# Patient Record
Sex: MALE | Race: WHITE | NOT HISPANIC OR LATINO | Employment: FULL TIME | URBAN - METROPOLITAN AREA
[De-identification: names, ages, dates, MRNs, and addresses within clinical notes are randomized per-mention and may not be internally consistent; named-entity substitution may affect disease eponyms.]

---

## 2017-01-09 ENCOUNTER — OFFICE VISIT (OUTPATIENT)
Dept: OTHER | Facility: OUTPATIENT CENTER | Age: 47
End: 2017-01-09

## 2017-01-09 DIAGNOSIS — Z51.89 AFTERCARE: Primary | ICD-10-CM

## 2017-01-15 NOTE — PROGRESS NOTES
[place] [facility] -  Case Progress Note    1/09/17    Client Name: Javi Salmeron  YOB: 1970  MRN:  [id number]  Treating Provider: Connie Baugh, PhD  Type of Session: Aftercare support goup.  Number of Minutes: 120    Current Symptoms/Status:  Doing well.  Having no boundary violations.    Progress Toward Treatment Goals:   Using support system.  Continues to date on particular woman.  Feeling goood about this.      Intervention: Modality and Description:  Support group.         Response to Intervention:  Didn't take much time but was an active participant in group.    Assignment:  Be more mindful.  Communicate with his girlfriend.  Stay within boundaries.    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  Return for aftercare support group.

## 2017-01-18 ENCOUNTER — OFFICE VISIT (OUTPATIENT)
Dept: OTHER | Facility: OUTPATIENT CENTER | Age: 47
End: 2017-01-18

## 2017-01-18 DIAGNOSIS — F91.8 CONDUCT DISORDER, UNDIFFERENTIATED TYPE: Primary | ICD-10-CM

## 2017-01-18 DIAGNOSIS — F34.1 DYSTHYMIC DISORDER: ICD-10-CM

## 2017-01-23 NOTE — PROGRESS NOTES
[facility] -  Case Progress Note    1/18/17    Client Name: Javi Salmeron  YOB: 1970  MRN:  [id number]  Treating Provider: Connie aBugh, PhD  Type of Session: Individual  Number of Minutes: 55    Current Symptoms/Status:  Doing well.  Having no boundary violations.    Progress Toward Treatment Goals:   Using support system.  Continues to one woman.  Feeling good about his healthy relationship.    Intervention: Modality and Description:  Cognitive behaivoral therapy.  Interpersonal techniques.  Relapse prevetnion.       Response to Intervention:  Relationship going very well.  Mostly concerned about when to introduce her to his kids.  Some disagreement with ex-wife about timing.  Suggested talking to her.  Feeling very good about his using his tools to deal with CAB.    Assignment:  Be more mindful.  Communicate with his ex-wife.  Stay within boundaries.    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  Return for aftercare support group.

## 2017-02-08 ENCOUNTER — TELEPHONE (OUTPATIENT)
Dept: OTHER | Facility: OUTPATIENT CENTER | Age: 47
End: 2017-02-08

## 2017-02-13 ENCOUNTER — OFFICE VISIT (OUTPATIENT)
Dept: OTHER | Facility: OUTPATIENT CENTER | Age: 47
End: 2017-02-13

## 2017-02-13 DIAGNOSIS — Z51.89 AFTERCARE: Primary | ICD-10-CM

## 2017-02-13 NOTE — MR AVS SNAPSHOT
After Visit Summary   2/13/2017    Javi Salmeron    MRN: [id number]           Patient Information     Date Of Birth          1970        Visit Information        Provider Department      2/13/2017 4:30 PM [facility] AC 2 [facility]        Today's Diagnoses     Aftercare    -  1       Follow-ups after your visit        Your next 10 appointments already scheduled     Mar 06, 2017  4:30 PM CST   GROUP with [facility] AC 2   [facility] ([facility])    [address]  [address]  [facility] [address]   [phone]            Mar 08, 2017  4:00 PM CST   INDIVIDUAL THERAPY with Thomas Baugh, PhD TYLER   [place] [facility] ([facility])    [address]  [address]  [facility] [address]   [phone]            Apr 12, 2017  4:00 PM CDT   INDIVIDUAL THERAPY with Thomas Baugh, PhD TYLER   [place] [facility] ([facility])    [address]  [address]  [facility] [address]   [phone]              Who to contact     Please call your clinic at [phone] to:    Ask questions about your health    Make or cancel appointments    Discuss your medicines    Learn about your test results    Speak to your doctor   If you have compliments or concerns about an experience at your clinic, or if you wish to file a complaint, please contact [facility] Patient Relations at [phone] or email us at [email or web address]         Additional Information About Your Visit        MyChart Information     Bookmytrainings.com is an electronic gateway that provides easy, online access to your medical records. With Bookmytrainings.[email or web address], you can request a clinic appointment, read your test results, renew a prescription or communicate with your care team.     To sign up for Cnano Technologyt visit the website at [email or web address]   You will be asked to enter the access code listed below, as well as  some personal information. Please follow the directions to create your username and password.     Your access code is: [email or web address]  Expires: 2017  9:41 AM     Your access code will  in 90 days. If you need help or a new code, please contact your [facility] or call [phone] for assistance.        Care EveryWhere ID     This is your Care EveryWhere ID. This could be used by other organizations to access your [place] medical records  [id number]         Blood Pressure from Last 3 Encounters:   16 134/88    Weight from Last 3 Encounters:   16 90.7 kg (200 lb)              We Performed the Following     Support Group - 120 Min. [44632.539]        Primary Care Provider    None       No address on file        Thank you!     Thank you for choosing [facility]  for your care. Our goal is always to provide you with excellent care. Hearing back from our patients is one way we can continue to improve our services. Please take a few minutes to complete the written survey that you may receive in the mail after your visit with us. Thank you!             Your Updated Medication List - Protect others around you: Learn how to safely use, store and throw away your medicines at www.disposemymeds.org.          This list is accurate as of: 17 11:59 PM.  Always use your most recent med list.                   Brand Name Dispense Instructions for use    citalopram 40 MG tablet    celeXA    30 tablet    Take 1 tablet (40 mg) by mouth every morning       cyclobenzaprine 10 MG tablet    FLEXERIL    30 tablet    Take 1 tablet (10 mg) by mouth 3 times daily as needed for muscle spasms       naltrexone 50 MG tablet    DEPADE;REVIA    90 tablet    Take 1 tablet (50 mg) by mouth every morning       OMEGA-3 FISH OIL PO      Take 1,200 mg by mouth daily       SYSTANE OP      Place 1 drop into both eyes 2 times daily

## 2017-02-14 NOTE — PROGRESS NOTES
[place] -  Case Progress Note    2/13/17    Client Name: Javi Salmeron  YOB: 1970  MRN:  [id number]  Treating Provider: Connie Baugh, PhD  Type of Session: Aftercare support goup.  Number of Minutes: 120    Current Symptoms/Status:  Doing well.  Having no boundary violations.    Progress Toward Treatment Goals:   Using support system.  Continues to date on particular woman.  Feeling goood about this.      Intervention: Modality and Description:  Aftercare Support group.         Response to Intervention:  Didn't take much time but was an active participant in group.    Assignment:  Stay within boundaries.    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  Return for aftercare support group.

## 2017-03-06 ENCOUNTER — OFFICE VISIT (OUTPATIENT)
Dept: OTHER | Facility: OUTPATIENT CENTER | Age: 47
End: 2017-03-06

## 2017-03-06 DIAGNOSIS — Z51.89 AFTERCARE: Primary | ICD-10-CM

## 2017-03-06 NOTE — MR AVS SNAPSHOT
After Visit Summary   3/6/2017    Javi Salmeron    MRN: [id number]           Patient Information     Date Of Birth          1970        Visit Information        Provider Department      3/6/2017 4:30 PM [facility] AC 2 [facility]        Today's Diagnoses     Aftercare    -  1       Follow-ups after your visit        Your next 10 appointments already scheduled     Apr 03, 2017  4:30 PM CDT   GROUP with [facility] AC 2   [facility] ([facility])    [address]  [address]  [facility] [address]   [phone]            Apr 12, 2017  4:00 PM CDT   INDIVIDUAL THERAPY with Thomas Baugh, PhD TYLER   [place] [facility] ([facility])    [address]  [address]  [facility] [address]   [phone]            May 16, 2017  4:00 PM CDT   INDIVIDUAL THERAPY with Thomas Baugh, PhD TYLER   [place] [facility] ([facility])    [address]  [address]  [facility] [address]   [phone]              Who to contact     Please call your clinic at [phone] to:    Ask questions about your health    Make or cancel appointments    Discuss your medicines    Learn about your test results    Speak to your doctor   If you have compliments or concerns about an experience at your clinic, or if you wish to file a complaint, please contact [facility] Phys[facility] Patient Relations at [phone] or email us at [email or web address]         Additional Information About Your Visit        MyChart Information     Spaces 2 Host is an electronic gateway that provides easy, online access to your medical records. With Spaces 2 Host, you can request a clinic appointment, read your test results, renew a prescription or communicate with your care team.     To sign up for Caarbont visit the website at [email or web address]   You will be asked to enter the access code listed below, as well as  some personal information. Please follow the directions to create your username and password.     Your access code is: [email or web address]  Expires: 2017 10:41 AM     Your access code will  in 90 days. If you need help or a new code, please contact your [facility] or call [phone] for assistance.        Care EveryWhere ID     This is your Care EveryWhere ID. This could be used by other organizations to access your [place] medical records  [id number]         Blood Pressure from Last 3 Encounters:   16 134/88    Weight from Last 3 Encounters:   16 90.7 kg (200 lb)              We Performed the Following     Support Group - 120 Min. [88316.539]        Primary Care Provider    None       No address on file        Thank you!     Thank you for choosing [facility]  for your care. Our goal is always to provide you with excellent care. Hearing back from our patients is one way we can continue to improve our services. Please take a few minutes to complete the written survey that you may receive in the mail after your visit with us. Thank you!             Your Updated Medication List - Protect others around you: Learn how to safely use, store and throw away your medicines at www.disposemymeds.org.          This list is accurate as of: 3/6/17 11:59 PM.  Always use your most recent med list.                   Brand Name Dispense Instructions for use    citalopram 40 MG tablet    celeXA    30 tablet    Take 1 tablet (40 mg) by mouth every morning       cyclobenzaprine 10 MG tablet    FLEXERIL    30 tablet    Take 1 tablet (10 mg) by mouth 3 times daily as needed for muscle spasms       naltrexone 50 MG tablet    DEPADE;REVIA    90 tablet    Take 1 tablet (50 mg) by mouth every morning       OMEGA-3 FISH OIL PO      Take 1,200 mg by mouth daily       SYSTANE OP      Place 1 drop into both eyes 2 times daily

## 2017-03-08 ENCOUNTER — OFFICE VISIT (OUTPATIENT)
Dept: OTHER | Facility: OUTPATIENT CENTER | Age: 47
End: 2017-03-08

## 2017-03-08 DIAGNOSIS — F91.8 CONDUCT DISORDER, UNDIFFERENTIATED TYPE: Primary | ICD-10-CM

## 2017-03-08 DIAGNOSIS — F34.1 DYSTHYMIC DISORDER: ICD-10-CM

## 2017-03-08 NOTE — MR AVS SNAPSHOT
After Visit Summary   3/8/2017    Javi Salmeron    MRN: [id number]           Patient Information     Date Of Birth          1970        Visit Information        Provider Department      3/8/2017 4:00 PM Thomas Baugh, PhD [facility]        Today's Diagnoses     Conduct disorder, undifferentiated type    -  1    Dysthymic disorder           Follow-ups after your visit        Your next 10 appointments already scheduled     Apr 03, 2017  4:30 PM CDT   [facility]   [facility] ([facility])    [address]  [address]  [facility] [address]   [phone]            Apr 12, 2017  4:00 PM CDT   INDIVIDUAL THERAPY with Thomas Baugh, PhD TYLER   [facility] ([facility])    [address]  [address]  [facility] [address]   [phone]            May 16, 2017  4:00 PM CDT   INDIVIDUAL THERAPY with Thomas Baugh, PhD TYLER   [facility] ([facility])    [address]  [address]  [facility] [address]   [phone]              Who to contact     Please call your clinic at [phone] to:    Ask questions about your health    Make or cancel appointments    Discuss your medicines    Learn about your test results    Speak to your doctor   If you have compliments or concerns about an experience at your clinic, or if you wish to file a complaint, please contact [facility] Patient Relations at [phone] or email us at [email or web address]         Additional Information About Your Visit        MyChart Information     VKernel Corporation is an electronic gateway that provides easy, online access to your medical records. With VKernel Corporation, you can request a clinic appointment, read your test results, renew a prescription or communicate with your care team.     To sign up for Vaporet visit the website at [email or web address]    You will be asked to enter the access code listed below, as well as some personal information. Please follow the directions to create your username and password.     Your access code is: [email or web address]  Expires: 2017 10:41 AM     Your access code will  in 90 days. If you need help or a new code, please contact your [facility] or call [phone] for assistance.        Care EveryWhere ID     This is your Care EveryWhere ID. This could be used by other organizations to access your [facility] medical records  [id number]         Blood Pressure from Last 3 Encounters:   16 134/88    Weight from Last 3 Encounters:   16 90.7 kg (200 lb)              We Performed the Following     Individual Psychotherapy (53+ min) [28280]        Primary Care Provider    None       No address on file        Thank you!     Thank you for choosing [facility] HEALTH  for your care. Our goal is always to provide you with excellent care. Hearing back from our patients is one way we can continue to improve our services. Please take a few minutes to complete the written survey that you may receive in the mail after your visit with us. Thank you!             Your Updated Medication List - Protect others around you: Learn how to safely use, store and throw away your medicines at www.disposemymeds.org.          This list is accurate as of: 3/8/17 11:59 PM.  Always use your most recent med list.                   Brand Name Dispense Instructions for use    citalopram 40 MG tablet    celeXA    30 tablet    Take 1 tablet (40 mg) by mouth every morning       cyclobenzaprine 10 MG tablet    FLEXERIL    30 tablet    Take 1 tablet (10 mg) by mouth 3 times daily as needed for muscle spasms       naltrexone 50 MG tablet    DEPADE;REVIA    90 tablet    Take 1 tablet (50 mg) by mouth every morning       OMEGA-3 FISH OIL PO      Take 1,200 mg by mouth daily       SYSTANE OP      Place 1 drop into both  eyes 2 times daily

## 2017-03-12 NOTE — PROGRESS NOTES
[place] [facility] -  Case Progress Note    3/08/17    Client Name: Javi Salmeron  YOB: 1970  MRN:  [id number]  Treating Provider: Connie Baugh, PhD  Type of Session: Individual  Number of Minutes: 55    Current Symptoms/Status:  Doing well.  Having no boundary violations.    Progress Toward Treatment Goals:   Using support system.  Continues to date particular woman.  Feeling good about this.      Intervention: Modality and Description:  Individual psychotheerapy.  Interpersonal and CBT approach.    Response to Intervention:  Dealing with conflicts around boundaries of his pornography with his partner.  Discussed what would be good boundaries for him.  He knows he will not stop using - and doesn't feel that is harmful.  Encouraged more open commuhnication with partner.    Feeling good about how his relationship is progressing.    Assignment:  Stay within boundaries.    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  Return for aftercare support group.

## 2017-03-22 NOTE — PROGRESS NOTES
[place] [facility] -  Case Progress Note    3/06/17    Client Name: Javi Salmeron  YOB: 1970  MRN:  [id number]  Treating Provider: Connie Baugh, PhD  Type of Session: Aftercare support goup.  Number of Minutes: 120    Current Symptoms/Status:  Doing well.  Having no boundary violations.    Progress Toward Treatment Goals:   Using support system.  Continues to date same particular woman.  Feeling goood about this.      Intervention: Modality and Description:  Aftercare Support group.         Response to Intervention:  Didn't take much time but was an active participant in group.    Assignment:  Stay within boundaries.    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  Return for aftercare support group.

## 2017-04-03 ENCOUNTER — OFFICE VISIT (OUTPATIENT)
Dept: OTHER | Facility: OUTPATIENT CENTER | Age: 47
End: 2017-04-03

## 2017-04-03 DIAGNOSIS — Z51.89 AFTERCARE: Primary | ICD-10-CM

## 2017-04-03 NOTE — PROGRESS NOTES
[place] [facility]  Case Progress Note    4/03/17    Client Name: Javi Salmeron  YOB: 1970  MRN:  [id number]  Treating Provider: Connie Baugh, PhD  Type of Session: Aftercare support goup.  Number of Minutes: 120    Current Symptoms/Status:  Doing well.  Having no boundary violations.    Progress Toward Treatment Goals:   Using support system.  Continues to date same particular woman.  Feeling goood about this.      Intervention: Modality and Description:  Aftercare Support group.         Response to Intervention:  Didn't take much time but was an active participant in group.    Assignment:  Stay within boundaries.    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  Return for aftercare support group.

## 2017-04-03 NOTE — MR AVS SNAPSHOT
After Visit Summary   4/3/2017    Javi Samleron    MRN: [id number]           Patient Information     Date Of Birth          1970        Visit Information        Provider Department      4/3/2017 4:30 PM [facility] CS[facility] AC 2 [facility]        Today's Diagnoses     Aftercare    -  1       Follow-ups after your visit        Your next 10 appointments already scheduled     Apr 12, 2017  4:00 PM CDT   INDIVIDUAL THERAPY with Thomas Baguh, PhD TYLER   [facility]    [address]  [address]  [facility] [address]   [phone]            May 01, 2017  4:30 PM CDT   [facility]   [place] [facility] ([facility]    [address]  [address]  [facility] [address]   [phone]            May 16, 2017  4:00 PM CDT   INDIVIDUAL THERAPY with Thomas Buagh, PhD TYLER   [place] [facility]    [address]  [address]  [facility] [address]   [phone]            Jun 05, 2017  4:30 PM CDT   [facility]   [place] [facility] ([facility]    [address]  [address]  [facility] [address]   [phone]              Who to contact     Please call your clinic at [phone] to:    Ask questions about your health    Make or cancel appointments    Discuss your medicines    Learn about your test results    Speak to your doctor   If you have compliments or concerns about an experience at your clinic, or if you wish to file a complaint, please contact [facility] Patient Relations at [phone] or email us at [email or web address]         Additional Information About Your Visit        Covacsishart Information     Fanwards is an electronic gateway that provides easy, online access to your medical records. With Fanwards, you can request a clinic appointment, read your test results, renew a  prescription or communicate with your care team.     To sign up for SinColahart visit the website at [email or web address]   You will be asked to enter the access code listed below, as well as some personal information. Please follow the directions to create your username and password.     Your access code is: [email or web address]  Expires: 2017 10:41 AM     Your access code will  in 90 days. If you need help or a new code, please contact your [facility] or call [phone] for assistance.        Care EveryWhere ID     This is your Care EveryWhere ID. This could be used by other organizations to access your [place] medical records  [id number]         Blood Pressure from Last 3 Encounters:   16 134/88    Weight from Last 3 Encounters:   16 90.7 kg (200 lb)              We Performed the Following     Support Group - 120 Min. [27548.539]        Primary Care Provider    None       No address on file        Thank you!     Thank you for choosing [facility] S[facility] HE[facility]  for your care. Our goal is always to provide you with excellent care. Hearing back from our patients is one way we can continue to improve our services. Please take a few minutes to complete the written survey that you may receive in the mail after your visit with us. Thank you!             Your Updated Medication List - Protect others around you: Learn how to safely use, store and throw away your medicines at www.disposemymeds.org.          This list is accurate as of: 4/3/17  6:12 PM.  Always use your most recent med list.                   Brand Name Dispense Instructions for use    citalopram 40 MG tablet    celeXA    30 tablet    Take 1 tablet (40 mg) by mouth every morning       cyclobenzaprine 10 MG tablet    FLEXERIL    30 tablet    Take 1 tablet (10 mg) by mouth 3 times daily as needed for muscle spasms       naltrexone 50 MG tablet    DEPADE;REVIA    90 tablet    Take 1 tablet (50 mg) by mouth  every morning       OMEGA-3 FISH OIL PO      Take 1,200 mg by mouth daily       SYSTANE OP      Place 1 drop into both eyes 2 times daily

## 2017-05-01 ENCOUNTER — OFFICE VISIT (OUTPATIENT)
Dept: OTHER | Facility: OUTPATIENT CENTER | Age: 47
End: 2017-05-01

## 2017-05-01 DIAGNOSIS — Z51.89 AFTERCARE: Primary | ICD-10-CM

## 2017-05-01 NOTE — PROGRESS NOTES
[facility]  Case Progress Note    5/01/17    Client Name: Javi Salmeron  YOB: 1970  MRN:  [id number]  Treating Provider: Connie Baugh, PhD  Type of Session: Aftercare support goup.  Number of Minutes: 120    Current Symptoms/Status:  Doing well.  Has been dealing with some triggering situations.  Having no boundary violations.    Progress Toward Treatment Goals:   Using support system.  Continues to date same particular woman.  Feeling goood about this.    Very stressed  about son who has been acting out.    Intervention: Modality and Description:  Aftercare Support group.         Response to Intervention:  Took time - talking about stress with son.  Feeling inadequate with son  acting out.  Received good feedback with group.    Assignment:  Stay within boundaries.    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  Return for aftercare support group.

## 2017-05-01 NOTE — MR AVS SNAPSHOT
After Visit Summary   5/1/2017    Javi Salmeron    MRN: [id number]           Patient Information     Date Of Birth          1970        Visit Information        Provider Department      5/1/2017 4:30 PM [facility] AC [facility]        Today's Diagnoses     Aftercare    -  1       Follow-ups after your visit        Your next 10 appointments already scheduled     May 16, 2017  4:00 PM CDT   INDIVIDUAL THERAPY with Thomas Baugh, PhD TYLER   [facility] ([facility])    [address]  Mail [address] 7521  [facility] [address]   [phone]            Jun 05, 2017  4:30 PM CDT   GROUP with  UMP CSB AC 2   [facility] ([facility])    [address]  [address]  [facility] [address]   [phone]            Jun 13, 2017  4:00 PM CDT   INDIVIDUAL THERAPY with Thomas Baugh, PhD TYLER   [facility] ([facility])    [address]  [address]  [facility] [address]   [phone]            Jul 03, 2017  4:30 PM CDT   GROUP with  UMP CSB AC 2   [facility] ([facility])    [address]  [address]  [facility] [address]   [phone]            Jul 19, 2017  4:00 PM CDT   INDIVIDUAL THERAPY with Thomas Baugh, PhD    [facility] ([facility])    [address]  [address]  [facility] [address]   [phone]            Aug 07, 2017  4:30 PM CDT   GROUP with  UMP CSB AC 2   [place] ([facility])    [address]  [address]  [facility] [address]   [phone]              Who to contact     Please call your clinic at [phone] to:    Ask questions about your health    Make or cancel appointments    Discuss your medicines    Learn about your test results    Speak to your doctor   If you have compliments or concerns about an experience at your clinic, or if you  wish to file a complaint, please contact [facility] Patient Relations at [phone] or email us at [email or web address]         Additional Information About Your Visit        Kurve TechnologyharTiggly Information     Farmol is an electronic gateway that provides easy, online access to your medical records. With Farmol, you can request a clinic appointment, read your test results, renew a prescription or communicate with your care team.     To sign up for Farmol visit the website at www.[email or web address] Style [email or web address] Twitter Dashboard   You will be asked to enter the access code listed below, as well as some personal information. Please follow the directions to create your username and password.     Your access code is: [email or web address]  Expires: 2017 10:41 AM     Your access code will  in 90 days. If you need help or a new code, please contact your [facility] or call [phone] for assistance.        Care EveryWhere ID     This is your Care EveryWhere ID. This could be used by other organizations to access your [place] medical records  [id number]         Blood Pressure from Last 3 Encounters:   16 134/88    Weight from Last 3 Encounters:   16 90.7 kg (200 lb)              We Performed the Following     Support Group - 120 Min. [40492.539]        Primary Care Provider    None       No address on file        Thank you!     Thank you for choosing [facility]  for your care. Our goal is always to provide you with excellent care. Hearing back from our patients is one way we can continue to improve our services. Please take a few minutes to complete the written survey that you may receive in the mail after your visit with us. Thank you!             Your Updated Medication List - Protect others around you: Learn how to safely use, store and throw away your medicines at www.disposemymeds.org.          This list is accurate as of: 17  5:18 PM.  Always  use your most recent med list.                   Brand Name Dispense Instructions for use    citalopram 40 MG tablet    celeXA    30 tablet    Take 1 tablet (40 mg) by mouth every morning       cyclobenzaprine 10 MG tablet    FLEXERIL    30 tablet    Take 1 tablet (10 mg) by mouth 3 times daily as needed for muscle spasms       naltrexone 50 MG tablet    DEPADE;REVIA    90 tablet    Take 1 tablet (50 mg) by mouth every morning       OMEGA-3 FISH OIL PO      Take 1,200 mg by mouth daily       SYSTANE OP      Place 1 drop into both eyes 2 times daily

## 2017-05-16 ENCOUNTER — OFFICE VISIT (OUTPATIENT)
Dept: OTHER | Facility: OUTPATIENT CENTER | Age: 47
End: 2017-05-16

## 2017-05-16 DIAGNOSIS — F91.8 CONDUCT DISORDER, UNDIFFERENTIATED TYPE: Primary | ICD-10-CM

## 2017-05-16 DIAGNOSIS — F34.1 DYSTHYMIC DISORDER: ICD-10-CM

## 2017-05-16 NOTE — PROGRESS NOTES
[facility]  Case Progress Note    5/16/17    Client Name: Javi Salmeron  YOB: 1970  MRN:  [id number]  Treating Provider: Connie Baugh, PhD  Type of Session: Individual  Number of Minutes: 55    Current Symptoms/Status:  Doing well.  Has been dealing with some triggering situations.  Having no boundary violations.    Progress Toward Treatment Goals:   Using support system.  Continues to date same particular woman.  Feeling goood about this.    Very stressed  about son who has been acting out.    Intervention: Modality and Description:  Individual therapy; CBT and relapse prevention approach.      Response to Intervention:  Still dealing with stress with son.  Encouraged him to get some help with parenting.  Some impression management issues with girlfriend - and talked about ways of being more authentic.    Staying within boundaries and feeling good about his progress.    Assignment:  Stay within boundaries.  Read Mervat's book.    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  Return for aftercare support group.

## 2017-05-16 NOTE — MR AVS SNAPSHOT
After Visit Summary   5/16/2017    Javi Salmeron    MRN: [id number]           Patient Information     Date Of Birth          1970        Visit Information        Provider Department      5/16/2017 4:00 PM Thomas Baugh, PhD TYL[facility]        Today's Diagnoses     Conduct disorder, undifferentiated type    -  1    Dysthymic disorder           Follow-ups after your visit        Your next 10 appointments already scheduled     Jun 05, 2017  4:30 PM CDT   GROUP with SE P CSB AC 2   [facility] ([facility])    [address]  [address]  [facility] [address]   [phone]            Jun 13, 2017  4:00 PM CDT   INDIVIDUAL THERAPY with Thomas Baugh, PhD TYLER   [facility] ([facility])    [address]  [address]  [facility] [address]   [phone]            Jul 03, 2017  4:30 PM CDT   [facility]   [place] [facility] ([facility])    [address]  Mail [address]  [facility] [address]   [phone]            Jul 19, 2017  4:00 PM CDT   INDIVIDUAL THERAPY with Thomas Baugh, PhD TYLER   [facility] ([facility])    [address]  [address]  [facility] [address]   [phone]            Aug 07, 2017  4:30 PM CDT   [facility]   [place] [facility] ([facility])    [address]  [address]  [facility] [address]   [phone]              Who to contact     Please call your clinic at [phone] to:    Ask questions about your health    Make or cancel appointments    Discuss your medicines    Learn about your test results    Speak to your doctor   If you have compliments or concerns about an experience at your clinic, or if you wish to file a complaint, please contact [facility] Patient Relations at [phone] or email us at  [email or web address]         Additional Information About Your Visit        MECLUBharConsumer Health Advisers Information     GetOne Rewards is an electronic gateway that provides easy, online access to your medical records. With GetOne Rewards, you can request a clinic appointment, read your test results, renew a prescription or communicate with your care team.     To sign up for GetOne Rewards visit the website at [email or web address]   You will be asked to enter the access code listed below, as well as some personal information. Please follow the directions to create your username and password.     Your access code is: [email or web address]  Expires: 2017  5:07 PM     Your access code will  in 90 days. If you need help or a new code, please contact your [facility] or call [phone] for assistance.        Care EveryWhere ID     This is your Care EveryWhere ID. This could be used by other organizations to access your [place] medical records  [id number]         Blood Pressure from Last 3 Encounters:   16 134/88    Weight from Last 3 Encounters:   16 90.7 kg (200 lb)              We Performed the Following     Individual Psychotherapy (53+ min) [80989]        Primary Care Provider    None       No address on file        Thank you!     Thank you for choosing [facility]  for your care. Our goal is always to provide you with excellent care. Hearing back from our patients is one way we can continue to improve our services. Please take a few minutes to complete the written survey that you may receive in the mail after your visit with us. Thank you!             Your Updated Medication List - Protect others around you: Learn how to safely use, store and throw away your medicines at www.disposemymeds.org.          This list is accurate as of: 17  5:07 PM.  Always use your most recent med list.                   Brand Name Dispense Instructions for use    citalopram 40 MG tablet     celeXA    30 tablet    Take 1 tablet (40 mg) by mouth every morning       cyclobenzaprine 10 MG tablet    FLEXERIL    30 tablet    Take 1 tablet (10 mg) by mouth 3 times daily as needed for muscle spasms       naltrexone 50 MG tablet    DEPADE;REVIA    90 tablet    Take 1 tablet (50 mg) by mouth every morning       OMEGA-3 FISH OIL PO      Take 1,200 mg by mouth daily       SYSTANE OP      Place 1 drop into both eyes 2 times daily

## 2017-06-05 ENCOUNTER — OFFICE VISIT (OUTPATIENT)
Dept: OTHER | Facility: OUTPATIENT CENTER | Age: 47
End: 2017-06-05

## 2017-06-05 DIAGNOSIS — Z51.89 AFTERCARE: Primary | ICD-10-CM

## 2017-06-05 NOTE — PROGRESS NOTES
[facility]  Case Progress Note    6/05/17    Client Name: Javi Salmeron  YOB: 1970  MRN:  [id number]  Treating Provider: Connie Baugh, PhD  Type of Session: Aftercare Group  Number of Minutes: 120    Current Symptoms/Status:  Had a rough month.  Difficulty with son - triggering him.   [place] violations: Went on [place] chatting.  Internet porn. as been dealing with some triggering situations.       Progress Toward Treatment Goals:   Using support system.  But having difficulty with boundaries - resulting from stress.  Very stressed  about son who has been acting out.    Intervention: Modality and Description:  Aftercare support group.    Response to Intervention:  Son acting out - and being very disrespectful to him.  Very triggered.  Received good feedback from group.    Assignment:  Stay within boundaries.  Read Mervat's book.    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  Return for aftercare support group.

## 2017-06-05 NOTE — MR AVS SNAPSHOT
After Visit Summary   6/5/2017    Javi Salmeron    MRN: [id number]           Patient Information     Date Of Birth          1970        Visit Information        Provider Department      [facility]        Today's Diagnoses     Aftercare    -  1       Follow-ups after your visit        Your next 10 appointments already scheduled     Jun 13, 2017  4:00 PM CDT   INDIVIDUAL THERAPY with Thomas Baugh, PhD TYLER   [facility] ([facility])    [address]  [address]  [facility] [address]   [phone]            Jul 03, 2017  4:30 PM CDT   [facility]   [facility] ([facility])    [address]  [address]  [facility] [address]   [phone]            Jul 19, 2017  4:00 PM CDT   INDIVIDUAL THERAPY with Thomas Baugh, PhD TYLER   [facility] ([facility])    [address]  [address]  [facility] [address]   [phone]            Aug 07, 2017  4:30 PM CDT   [facility]   [facility] ([facility])    [address]  [address]  [facility] [address]   [phone]            Aug 09, 2017  4:00 PM CDT   INDIVIDUAL THERAPY with Thomas Baugh, PhD TYLER   [facility] ([facility])    [address]  [address]  [facility] [address]   [phone]              Who to contact     Please call your clinic at [phone] to:    Ask questions about your health    Make or cancel appointments    Discuss your medicines    Learn about your test results    Speak to your doctor   If you have compliments or concerns about an experience at your clinic, or if you wish to file a complaint, please contact [facility] Patient Relations at [phone] or email us at [email or web address]         Additional Information About  Your Visit        Fry Multimedia Information     Fry Multimedia is an electronic gateway that provides easy, online access to your medical records. With Fry Multimedia, you can request a clinic appointment, read your test results, renew a prescription or communicate with your care team.     To sign up for Fry Multimedia visit the website at [email or web address] Network   You will be asked to enter the access code listed below, as well as some personal information. Please follow the directions to create your username and password.     Your access code is: [email or web address]  Expires: 2017  5:07 PM     Your access code will  in 90 days. If you need help or a new code, please contact your [facility] or call [phone] for assistance.        Care EveryWhere ID     This is your Care EveryWhere ID. This could be used by other organizations to access your [place] medical records  [id number]         Blood Pressure from Last 3 Encounters:   16 134/88    Weight from Last 3 Encounters:   16 90.7 kg (200 lb)              We Performed the Following     Support Group - 120 Min. [83582.539]        Primary Care Provider    None       No address on file        Thank you!     Thank you for choosing [facility]  for your care. Our goal is always to provide you with excellent care. Hearing back from our patients is one way we can continue to improve our services. Please take a few minutes to complete the written survey that you may receive in the mail after your visit with us. Thank you!             Your Updated Medication List - Protect others around you: Learn how to safely use, store and throw away your medicines at www.disposemymeds.org.          This list is accurate as of: 17  5:33 PM.  Always use your most recent med list.                   Brand Name Dispense Instructions for use    citalopram 40 MG tablet    celeXA    30 tablet    Take 1 tablet (40 mg) by mouth every morning        cyclobenzaprine 10 MG tablet    FLEXERIL    30 tablet    Take 1 tablet (10 mg) by mouth 3 times daily as needed for muscle spasms       naltrexone 50 MG tablet    DEPADE;REVIA    90 tablet    Take 1 tablet (50 mg) by mouth every morning       OMEGA-3 FISH OIL PO      Take 1,200 mg by mouth daily       SYSTANE OP      Place 1 drop into both eyes 2 times daily

## 2017-06-13 ENCOUNTER — OFFICE VISIT (OUTPATIENT)
Dept: OTHER | Facility: OUTPATIENT CENTER | Age: 47
End: 2017-06-13

## 2017-06-13 DIAGNOSIS — F91.8 CONDUCT DISORDER, UNDIFFERENTIATED TYPE: Primary | ICD-10-CM

## 2017-06-13 DIAGNOSIS — F34.1 DYSTHYMIC DISORDER: ICD-10-CM

## 2017-06-13 NOTE — MR AVS SNAPSHOT
After Visit Summary   6/13/2017    Javi Salmeron    MRN: [id number]           Patient Information     Date Of Birth          1970        Visit Information        Provider Department      6/13/2017 4:00 PM Thomas Baugh, PhD TYLER [facility]        Today's Diagnoses     Conduct disorder, undifferentiated type    -  1    Dysthymic disorder           Follow-ups after your visit        Your next 10 appointments already scheduled     Jul 03, 2017  4:30 PM CDT   GROUP with [facility] CSB AC 2   [place] [facility] ([facility])    [address]  Mail [address] 7521  [facility] [address]   [phone]            Jul 19, 2017  4:00 PM CDT   INDIVIDUAL THERAPY with Thomas Baugh, PhD TYLER   [place] [facility] ([facility])    [address]  Mail [address]  [facility] [address]   [phone]            Aug 07, 2017  4:30 PM CDT   GROUP with [facility] CSB AC 2   [place] [facility] ([facility])    [address]  Mail [address]  [facility] [address]   [phone]            Aug 09, 2017  4:00 PM CDT   INDIVIDUAL THERAPY with Thomas Baugh, PhD TYLER   [place] [facility] ([facility])    [address]  Mail [address] 75[address]  [facility] [address]   [phone]              Who to contact     Please call your clinic at [phone] to:    Ask questions about your health    Make or cancel appointments    Discuss your medicines    Learn about your test results    Speak to your doctor   If you have compliments or concerns about an experience at your clinic, or if you wish to file a complaint, please contact [facility] Patient Relations at [phone] or email us at [email or web address]         Additional Information About Your Visit        MyChart Information     Whaleback Systems is an electronic gateway that provides easy, online access to your medical records. With Whaleback Systems,  you can request a clinic appointment, read your test results, renew a prescription or communicate with your care team.     To sign up for [employer] visit the website at [email or web address]   You will be asked to enter the access code listed below, as well as some personal information. Please follow the directions to create your username and password.     Your access code is: [email or web address]  Expires: 2017  5:07 PM     Your access code will  in 90 days. If you need help or a new code, please contact your [facility] or call [phone] for assistance.        Care EveryWhere ID     This is your Care EveryWhere ID. This could be used by other organizations to access your [place] medical records  [id number]         Blood Pressure from Last 3 Encounters:   16 134/88    Weight from Last 3 Encounters:   16 90.7 kg (200 lb)              We Performed the Following     Individual Psychotherapy (53+ min) [10722]        Primary Care Provider    None       No address on file        Thank you!     Thank you for choosing [facility]  for your care. Our goal is always to provide you with excellent care. Hearing back from our patients is one way we can continue to improve our services. Please take a few minutes to complete the written survey that you may receive in the mail after your visit with us. Thank you!             Your Updated Medication List - Protect others around you: Learn how to safely use, store and throw away your medicines at www.disposemymeds.org.          This list is accurate as of: 17 11:59 PM.  Always use your most recent med list.                   Brand Name Dispense Instructions for use    citalopram 40 MG tablet    celeXA    30 tablet    Take 1 tablet (40 mg) by mouth every morning       cyclobenzaprine 10 MG tablet    FLEXERIL    30 tablet    Take 1 tablet (10 mg) by mouth 3 times daily as needed for muscle spasms       naltrexone  50 MG tablet    DEPADE;REVIA    90 tablet    Take 1 tablet (50 mg) by mouth every morning       OMEGA-3 FISH OIL PO      Take 1,200 mg by mouth daily       SYSTANE OP      Place 1 drop into both eyes 2 times daily

## 2017-06-13 NOTE — PROGRESS NOTES
[facility]  Case Progress Note    6/13/17    Client Name: Javi Salmeron  YOB: 1970  MRN:  [id number]  Treating Provider: Connie Baugh, PhD  Type of Session: Individual  Number of Minutes: 55    Current Symptoms/Status:  Had a rough month.  Difficulty with son - triggering him.   [place] violations: Went on [employer] chatting.  Internet porn. as been dealing with some triggering situations.       Progress Toward Treatment Goals:   Using support system.  But having difficulty with boundaries - resulting from stress.  Very stressed  about son who has been acting out.    Intervention: Modality and Description:  CBT, interpersonal    Response to Intervention:  Will have therapy with son - and this might help with the conflict  Encouraged more family therapy.  Taps into his core negative fuels - needs to work on re-framing - and also not going into negative cycle when triggered.  Death of cousin very troubling - suicide like his father.  Changes at work with new supervisor.  Stressful.  Talked about different coping strategies.       Assignment:  Stay within boundaries.  Read Mervat's book.    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  Return for aftercare support group.

## 2017-07-10 ENCOUNTER — OFFICE VISIT (OUTPATIENT)
Dept: OTHER | Facility: OUTPATIENT CENTER | Age: 47
End: 2017-07-10

## 2017-07-10 DIAGNOSIS — Z51.89 ENCOUNTER FOR OTHER SPECIFIED AFTERCARE: Primary | ICD-10-CM

## 2017-07-10 NOTE — MR AVS SNAPSHOT
After Visit Summary   7/10/2017    Javi Salmeron    MRN: [id number]           Patient Information     Date Of Birth          1970        Visit Information        Provider Department      7/10/2017 4:30 PM SE [facility] CSB AC 2 [facility]        Today's Diagnoses     Encounter for other specified aftercare    -  1       Follow-ups after your visit        Your next 10 appointments already scheduled     Jul 19, 2017  4:00 PM CDT   INDIVIDUAL THERAPY with Thomas Baugh, PhD    [facility] ([facility])    [address]  Mail Code 7521  [facility] [address]   [phone]            Aug 07, 2017  4:30 PM CDT   GROUP with SE P CSB AC 2   [facility] ([facility])    [address]  Mail [address] 7521  [facility] [address]   [phone]            Aug 09, 2017  4:00 PM CDT   INDIVIDUAL THERAPY with Thomas Baugh, PhD    [facility] ([facility])    [address]  Mail [address] 7521  [facility] [address]   [phone]            Sep 06, 2017  4:00 PM CDT   INDIVIDUAL THERAPY with Thomas Baugh, PhD    [facility] ([facility])    [address]  Mail [address] 7521  [facility] [address]   [phone]            Sep 11, 2017  4:30 PM CDT   GROUP with [facility] CSB AC 2   [facility] ([facility])    [address]  Mail [address] 7521  [facility] [address]   [phone]              Who to contact     Please call your clinic at [phone] to:    Ask questions about your health    Make or cancel appointments    Discuss your medicines    Learn about your test results    Speak to your doctor   If you have compliments or concerns about an experience at your clinic, or if you wish to file a complaint, please contact [facility] Patient Relations at [phone] or email us at [email or web address]          Additional Information About Your Visit        [email or web address] Information     [email or web address] is an electronic gateway that provides easy, online access to your medical records. With [email or web address], you can request a clinic appointment, read your test results, renew a prescription or communicate with your care team.     To sign up for [email or web address] visit the website at [email or web address]   You will be asked to enter the access code listed below, as well as some personal information. Please follow the directions to create your username and password.     Your access code is: [email or web address]  Expires: 2017  5:07 PM     Your access code will  in 90 days. If you need help or a new code, please contact your [facility] or call [phone] for assistance.        Care EveryWhere ID     This is your Care EveryWhere ID. This could be used by other organizations to access your [facility] medical records  [id number]         Blood Pressure from Last 3 Encounters:   16 134/88    Weight from Last 3 Encounters:   16 90.7 kg (200 lb)              We Performed the Following     Support Group 120 Min. ([phone])        Primary Care Provider    None       No address on file        Equal Access to Services     ANSHUL PALMER : Hadii mont[place] connollyo Sosundar, waaxda luqadaha, qaybta kaalmada aderachel, ying lainez. So [facility] [phone].    ATENCIÓN: Si habla español, tiene a horton disposición servicios gratuitos de asistencia lingüística. Llame al [phone].    We comply with applicable federal civil rights laws and [place] laws. We do not discriminate on the basis of race, color, national origin, age, disability sex, sexual orientation or gender identity.            Thank you!     Thank you for choosing [place] [facility]  for your care. Our goal is always to provide you with excellent care. Hearing back from our patients is one way we can continue to improve our  services. Please take a few minutes to complete the written survey that you may receive in the mail after your visit with us. Thank you!             Your Updated Medication List - Protect others around you: Learn how to safely use, store and throw away your medicines at www.disposemymeds.org.          This list is accurate as of: 7/10/17 11:59 PM.  Always use your most recent med list.                   Brand Name Dispense Instructions for use Diagnosis    citalopram 40 MG tablet    celeXA    30 tablet    Take 1 tablet (40 mg) by mouth every morning    Depression       cyclobenzaprine 10 MG tablet    FLEXERIL    30 tablet    Take 1 tablet (10 mg) by mouth 3 times daily as needed for muscle spasms    Acute bilateral low back pain without sciatica, Lumbar strain, initial encounter       naltrexone 50 MG tablet    DEPADE;REVIA    90 tablet    Take 1 tablet (50 mg) by mouth every morning    Unspecified psychosexual disorder       OMEGA-3 FISH OIL PO      Take 1,200 mg by mouth daily        SYSTANE OP      Place 1 drop into both eyes 2 times daily
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
## 2017-07-19 ENCOUNTER — OFFICE VISIT (OUTPATIENT)
Dept: OTHER | Facility: OUTPATIENT CENTER | Age: 47
End: 2017-07-19

## 2017-07-19 DIAGNOSIS — F91.8 CONDUCT DISORDER, UNDIFFERENTIATED TYPE: Primary | ICD-10-CM

## 2017-07-19 DIAGNOSIS — F34.1 DYSTHYMIC DISORDER: ICD-10-CM

## 2017-07-19 NOTE — MR AVS SNAPSHOT
After Visit Summary   7/19/2017    Javi Salmeron    MRN: [id number]           Patient Information     Date Of Birth          1970        Visit Information        Provider Department      7/19/2017 5:00 PM Thomas Baugh, PhD LP [facility]        Today's Diagnoses     Conduct disorder, undifferentiated type    -  1    Dysthymic disorder           Follow-ups after your visit        Your next 10 appointments already scheduled     Aug 07, 2017  4:30 PM CDT   GROUP with [facility] CSB AC 2   [place] ([facility])    [address]  Mail [address] 7521  [facility] [address]   [phone]            Aug 09, 2017  4:00 PM CDT   INDIVIDUAL THERAPY with Thomas Baugh, PhD TYLER   [facility] ([facility])    [address]  Mail [address] 7521  [facility] [address]   [phone]            Sep 06, 2017  4:00 PM CDT   INDIVIDUAL THERAPY with Thomas Baugh, PhD TYLER   [facility] ([facility])    [address]  Mail [address] 7521  [facility] [address]   [phone]            Sep 11, 2017  4:30 PM CDT   GROUP with [facility] CSB AC 2   [place] ([facility])    [address]  Mail [address] 7521  [facility] [address]   [phone]              Who to contact     Please call your clinic at [phone] to:    Ask questions about your health    Make or cancel appointments    Discuss your medicines    Learn about your test results    Speak to your doctor   If you have compliments or concerns about an experience at your clinic, or if you wish to file a complaint, please contact [facility] Patient Relations at [phone] or email us at [email or web address]         Additional Information About Your Visit        MyChart Information     Manzama is an electronic gateway that provides easy, online access to your medical records. With Manzama,  you can request a clinic appointment, read your test results, renew a prescription or communicate with your care team.     To sign up for AugmentWare visit the website at [email or web address]   You will be asked to enter the access code listed below, as well as some personal information. Please follow the directions to create your username and password.     Your access code is: [email or web address]  Expires: 2017  5:07 PM     Your access code will  in 90 days. If you need help or a new code, please contact your [facility] or call [phone] for assistance.        Care EveryWhere ID     This is your Care EveryWhere ID. This could be used by other organizations to access your [facility] medical records  [id number]         Blood Pressure from Last 3 Encounters:   16 134/88    Weight from Last 3 Encounters:   16 90.7 kg (200 lb)              We Performed the Following     Individual Psychotherapy (38-52 min) [02298]        Primary Care Provider    None       No address on file        Equal Access to Services     [facility] CenterREBECCA : Hadii mont[place] ku hadasho Soomaali, waaxda luqadaha, qaybta kaalmada ademaxyajaren, ying carmona . So [facility] [phone].    ATENCIÓN: Si habla español, tiene a horton disposición servicios gratuitos de asistencia lingüística. Llame al [phone].    We comply with applicable federal civil rights laws and [place] laws. We do not discriminate on the basis of race, color, national origin, age, disability sex, sexual orientation or gender identity.            Thank you!     Thank you for choosing [place] [facility]  for your care. Our goal is always to provide you with excellent care. Hearing back from our patients is one way we can continue to improve our services. Please take a few minutes to complete the written survey that you may receive in the mail after your visit with us. Thank you!             Your Updated Medication  List - Protect others around you: Learn how to safely use, store and throw away your medicines at www.disposemymeds.org.          This list is accurate as of: 7/19/17 11:59 PM.  Always use your most recent med list.                   Brand Name Dispense Instructions for use Diagnosis    citalopram 40 MG tablet    celeXA    30 tablet    Take 1 tablet (40 mg) by mouth every morning    Depression       cyclobenzaprine 10 MG tablet    FLEXERIL    30 tablet    Take 1 tablet (10 mg) by mouth 3 times daily as needed for muscle spasms    Acute bilateral low back pain without sciatica, Lumbar strain, initial encounter       naltrexone 50 MG tablet    DEPADE;REVIA    90 tablet    Take 1 tablet (50 mg) by mouth every morning    Unspecified psychosexual disorder       OMEGA-3 FISH OIL PO      Take 1,200 mg by mouth daily        SYSTANE OP      Place 1 drop into both eyes 2 times daily

## 2017-07-20 NOTE — PROGRESS NOTES
[facility]  Case Progress Note    7/19/17    Client Name: Javi Salmeron  YOB: 1970  MRN:  [id number]  Treating Provider: Connie Baugh, PhD  Type of Session: Individual  Number of Minutes: 45    Current Symptoms/Status:  Had a better month.  Continues to have difficulty with son - triggering him. Risk situation  No boundary violations.     Progress Toward Treatment Goals:   Using support system.   Very stressed  about son who has been acting out.    Intervention: Modality and Description:  CBT, interpersonal    Response to Intervention:  Still has not had any family therapy with son.  communication is still very difficult.  Encouraged him to talk to counselor than he needs help in communicating with son.  Son needs to accept more responsibility for his misbehavior.  Planning to go on vacation and question of whether son should go with.  What consequences if he misbehaves.    Son triggers his negative core fuels.  And this is why he needs to deal with this.     Assignment:  Stay within boundaries.  Read Mervat's book.    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  Return for aftercare support group.

## 2017-08-09 ENCOUNTER — TELEPHONE (OUTPATIENT)
Dept: OTHER | Facility: OUTPATIENT CENTER | Age: 47
End: 2017-08-09

## 2017-09-06 ENCOUNTER — OFFICE VISIT (OUTPATIENT)
Dept: OTHER | Facility: OUTPATIENT CENTER | Age: 47
End: 2017-09-06

## 2017-09-06 DIAGNOSIS — F91.8 CONDUCT DISORDER, UNDIFFERENTIATED TYPE: Primary | ICD-10-CM

## 2017-09-06 DIAGNOSIS — F34.1 DYSTHYMIC DISORDER: ICD-10-CM

## 2017-09-06 NOTE — MR AVS SNAPSHOT
After Visit Summary   9/6/2017    Javi Salmeron    MRN: [id number]           Patient Information     Date Of Birth          1970        Visit Information        Provider Department      9/6/2017 4:00 PM Thomas Baugh, PhD T[facility]ER [place] [facility]        Today's Diagnoses     Conduct disorder, undifferentiated type    -  1    Dysthymic disorder           Follow-ups after your visit        Your next 10 appointments already scheduled     Sep 11, 2017  4:30 PM CDT   GROUP with SE [facility] CSB AC 2   [place] [facility] ([facility])    [address]  Mail Code 7521  [facility] [address]   [phone]            Oct 02, 2017  4:30 PM CDT   GROUP with SE [facility] CSB AC 2   [facility] ([facility])    [address]  Mail Code 7521  [facility] [address]   [phone]            Nov 01, 2017  4:00 PM CDT   INDIVIDUAL THERAPY with Thomas Baugh, PhD TYLER   [place] [facility] ([facility])    [address]  Mail [address] 7521  [facility] [address]   [phone]              Who to contact     Please call your clinic at [phone] to:    Ask questions about your health    Make or cancel appointments    Discuss your medicines    Learn about your test results    Speak to your doctor   If you have compliments or concerns about an experience at your clinic, or if you wish to file a complaint, please contact [facility] Patient Relations at [phone] or email us at [email or web address] [email or web address]         Additional Information About Your Visit        MyChart Information     VenJuvo is an electronic gateway that provides easy, online access to your medical records. With VenJuvo, you can request a clinic appointment, read your test results, renew a prescription or communicate with your care team.     To sign up for Engana Ptyt visit the website at [email or web address]   You will be asked to enter  the access code listed below, as well as some personal information. Please follow the directions to create your username and password.     Your access code is: [email or web address]  Expires: 2017  3:44 PM     Your access code will  in 90 days. If you need help or a new code, please contact your [facility] or call [phone] for assistance.        Care EveryWhere ID     This is your Care EveryWhere ID. This could be used by other organizations to access your [place] medical records  [id number]         Blood Pressure from Last 3 Encounters:   16 134/88    Weight from Last 3 Encounters:   16 90.7 kg (200 lb)              We Performed the Following     Diagnostic Assessment (complete) [75610]        Primary Care Provider    None       No address on file        Equal Access to Services     ANSHUL PALMER : Alexis Rodriguez, waallan stroud, qaybdae kaalmajaren garcia, ying carmona . So [facility] [phone].    ATENCIÓN: Si habla español, tiene a horton disposición servicios gratuitos de asistencia lingüística. Llame al [phone].    We comply with applicable federal civil rights laws and [place] laws. We do not discriminate on the basis of race, color, national origin, age, disability sex, sexual orientation or gender identity.            Thank you!     Thank you for choosing [place] [facility]  for your care. Our goal is always to provide you with excellent care. Hearing back from our patients is one way we can continue to improve our services. Please take a few minutes to complete the written survey that you may receive in the mail after your visit with us. Thank you!             Your Updated Medication List - Protect others around you: Learn how to safely use, store and throw away your medicines at www.disposemymeds.org.          This list is accurate as of: 17 11:59 PM.  Always use your most recent med list.                    Brand Name Dispense Instructions for use Diagnosis    citalopram 40 MG tablet    celeXA    30 tablet    Take 1 tablet (40 mg) by mouth every morning    Depression       cyclobenzaprine 10 MG tablet    FLEXERIL    30 tablet    Take 1 tablet (10 mg) by mouth 3 times daily as needed for muscle spasms    Acute bilateral low back pain without sciatica, Lumbar strain, initial encounter       naltrexone 50 MG tablet    DEPADE;REVIA    90 tablet    Take 1 tablet (50 mg) by mouth every morning    Unspecified psychosexual disorder       OMEGA-3 FISH OIL PO      Take 1,200 mg by mouth daily        SYSTANE OP      Place 1 drop into both eyes 2 times daily

## 2017-09-18 ENCOUNTER — THERAPY VISIT (OUTPATIENT)
Dept: PHYSICAL THERAPY | Facility: CLINIC | Age: 47
End: 2017-09-18
Payer: COMMERCIAL

## 2017-09-18 DIAGNOSIS — M54.2 NECK PAIN: Primary | ICD-10-CM

## 2017-09-18 PROCEDURE — 97140 MANUAL THERAPY 1/> REGIONS: CPT | Mod: GP | Performed by: PHYSICAL THERAPIST

## 2017-09-18 PROCEDURE — 97110 THERAPEUTIC EXERCISES: CPT | Mod: GP | Performed by: PHYSICAL THERAPIST

## 2017-09-18 PROCEDURE — 97161 PT EVAL LOW COMPLEX 20 MIN: CPT | Mod: GP | Performed by: PHYSICAL THERAPIST

## 2017-09-18 NOTE — PROGRESS NOTES
[facility] Initial Evaluation      CERVICAL    Posture: uncomfortable sitting posture with consistent extraneous movement, slouched sitting posture with rounded shoulder and forward head    Neurological:    Motor Deficit:  Myotomes L R   C4 (shoulder elevation) 4/5 4/5   C5 (shoulder abduction) 4/5 4/5   C6 (elbow flexion) 5/5 5/5   C7 (elbow extension) 4/5 4/5   C8 (thumb extension)     T1 (finger add/abd)      Strength (lb)       Sensory Deficit, Reflexes, Dural Signs: reports tingling/numbness in B UE and B LE intermittently    AROM: (Major, Moderate, Minimal or Nil loss)  Movement Loss Aravind Mod Min Nil Pain   Protrusion        Flexion        Retraction    x No pain   Extension   x  Mild on L   Left Rotation    x 80degs + on L   Right Rotation    x 809degs + on L   Left Side Bending    x 34degs + on L   Right Side bending    x 35degs + on L      Repeated movement testing: repeated retraction improved cervical symptoms    Static Tests: ligament testing negative, sharps pursor, shear, kick, soft tissue tightness in B  Upper traps, levator scapulaes, mid scalenes, L 1st rib elevated with decreased mobility vs R 1st rib.  Other Tests: spurling's + L to L shoulder, scapula, LUE, minimal to no improvement with distraction, limited C2-7 sideglides                Subjective:    Patient is a 46 year old male presenting with rehab cervical spine hpi.   Javi Salmeron is a 46 year old male with a cervical spine condition.  Condition occurred with:  Other reason.  Condition occurred: for unknown reasons.  This is a new condition  9-12-17 saw MD for 2month history of neck pain. Pt reports MRI at [facility] confirmed disc bulge however no imaging report available at time of PT evaluation..    Patient reports pain:  Cervical left side and cervical right side.  Radiates to:  Upper arm left and elbow left.  Pain is described as shooting and aching and is constant and reported as 6/10.  Associated symptoms:  Headache  and tingling. Pain is the same all the time.  Symptoms are exacerbated by change of position and certain positions and relieved by nothing.  Since onset symptoms are unchanged.  Special tests:  MRI.  Previous treatment includes chiropractic.  There was no improvement following previous treatment.  General health as reported by patient is good.  Pertinent medical history includes:  Depression and hepatitis (cerebral palsy, hep B).    Other surgeries include:  No.  Current medications:  Anti-inflammatory (meltrexone).  Current occupation is Banker  .  Patient is working in normal job without restrictions.  Primary job tasks include:  Prolonged sitting.    Barriers include:  None as reported by the patient.    Red flags:  None as reported by the patient.                        Objective:    System    Physical Exam    General     ROS    Assessment/Plan:      Patient is a 46 year old male with cervical complaints.    Patient has the following significant findings with corresponding treatment plan.                Diagnosis 1:  Neck pain  Pain -  hot/cold therapy, US, electric stimulation, mechanical traction, manual therapy, education and home program  Decreased ROM/flexibility - manual therapy, therapeutic exercise, therapeutic activity and home program  Decreased joint mobility - manual therapy, therapeutic exercise, therapeutic activity and home program  Decreased strength - therapeutic exercise, therapeutic activities and home program  Impaired posture - neuro re-education, therapeutic activities and home program    Therapy Evaluation Codes:   1) History comprised of:   Personal factors that impact the plan of care:      Overall behavior pattern and Past/current experiences.    Comorbidity factors that impact the plan of care are:      Depression.     Medications impacting care: None.  2) Examination of Body Systems comprised of:   Body structures and functions that impact the plan of care:      Cervical  spine.   Activity limitations that impact the plan of care are:      Driving and Working.  3) Clinical presentation characteristics are:   Stable/Uncomplicated.  4) Decision-Making    Low complexity using standardized patient assessment instrument and/or measureable assessment of functional outcome.  Cumulative Therapy Evaluation is: Low complexity.    Previous and current functional limitations:  (See Goal Flow Sheet for this information)    Short term and Long term goals: (See Goal Flow Sheet for this information)     Communication ability:  Patient appears to be able to clearly communicate and understand verbal and written communication and follow directions correctly.  Treatment Explanation - The following has been discussed with the patient:   RX ordered/plan of care  Anticipated outcomes  Possible risks and side effects  This patient would benefit from PT intervention to resume normal activities.   Rehab potential is good.    Frequency:  1 X week, once daily  Duration:  for 10 weeks  Discharge Plan:  Achieve all LTG.  Independent in home treatment program.  Reach maximal therapeutic benefit.    Please refer to the daily flowsheet for treatment today, total treatment time and time spent performing 1:1 timed codes.

## 2017-09-18 NOTE — LETTER
[facility]  [address]  [place] [address]  [phone]    2017    Re: Javi Salmeron   :   1970  MRN:  [id number]   REFERRING PHYSICIAN:   [facility]    Date of Initial Evaluation:  17  Visits:  Rxs Used: 1  Reason for Referral:  Neck pain    [facility] Initial Evaluation  CERVICAL    Posture: uncomfortable sitting posture with consistent extraneous movement, slouched sitting posture with rounded shoulder and forward head    Neurological:    Motor Deficit:  Myotomes L R   C4 (shoulder elevation) 4/5 4/5   C5 (shoulder abduction) 4/5 4/5   C6 (elbow flexion) 5/5 5/5   C7 (elbow extension) 4/5 4/5   C8 (thumb extension)     T1 (finger add/abd)      Strength (lb)       Sensory Deficit, Reflexes, Dural Signs: reports tingling/numbness in B UE and B LE intermittently    AROM: (Major, Moderate, Minimal or Nil loss)  Movement Loss Aravind Mod Min Nil Pain   Protrusion        Flexion        Retraction    x No pain   Extension   x  Mild on L   Left Rotation    x 80degs + on L   Right Rotation    x 809degs + on L   Left Side Bending    x 34degs + on L   Right Side bending    x 35degs + on L      Repeated movement testing: repeated retraction improved cervical symptoms    Static Tests: ligament testing negative, sharps pursor, shear, kick, soft tissue tightness in B  Upper traps, levator scapulaes, mid scalenes, L 1st rib elevated with decreased mobility vs R 1st rib.    Other Tests: spurling's + L to L shoulder, scapula, LUE, minimal to no improvement with distraction, limited C2-7 sideglides    Subjective:  Patient is a 46 year old male presenting with rehab cervical spine hpi.   Javi Salmeron is a 46 year old male with a cervical spine condition.  Condition occurred with:  Other reason.  Condition occurred: for unknown reasons.  This is a new condition  17 saw MD for 2month history of neck pain. [facility] confirmed disc bulge however no imaging report available at time of PT evaluation..    Patient reports pain:  Cervical left side and cervical right side.  Radiates to:  Upper arm left and elbow left.  Pain is described as shooting and aching and is constant and reported as 6/10.  Associated symptoms:  Headache and tingling. Pain is the same all the time.  Symptoms are exacerbated by change of position and certain positions and relieved by nothing.  Since onset symptoms are unchanged.  Special tests:  MRI.  Previous treatment includes chiropractic.  There was no improvement following previous treatment.  General health as reported by patient is good.  Pertinent medical history includes:  Depression and hepatitis (cerebral palsy, hep B).    Other surgeries include:  No.  Current medications:  Anti-inflammatory (meltrexone).  Current occupation is Banker  .  Patient is working in normal job without restrictions.  Primary job tasks include:  Prolonged sitting.  Barriers include:  None as reported by the patient.    Red flags:  None as reported by the patient.    Assessment/Plan:    Patient is a 46 year old male with cervical complaints.    Patient has the following significant findings with corresponding treatment plan.                Diagnosis 1:  Neck pain  Pain -  hot/cold therapy, US, electric stimulation, mechanical traction, manual therapy, education and home program  Decreased ROM/flexibility - manual therapy, therapeutic exercise, therapeutic activity and home program  Decreased joint mobility - manual therapy, therapeutic exercise, therapeutic activity and home program  Decreased strength - therapeutic exercise, therapeutic activities and home program  Impaired posture - neuro re-education, therapeutic activities and home program    Therapy Evaluation Codes:   1) History comprised of:   Personal factors that impact the plan of care:      Overall behavior pattern and Past/current experiences.    Comorbidity  factors that impact the plan of care are:      Depression.     Medications impacting care: None.  2) Examination of Body Systems comprised of:   Body structures and functions that impact the plan of care:      Cervical spine.   Activity limitations that impact the plan of care are:      Driving and Working.  3) Clinical presentation characteristics are:   Stable/Uncomplicated.  4) Decision-Making    Low complexity using standardized patient assessment instrument and/or measureable assessment of functional outcome.    Cumulative Therapy Evaluation is: Low complexity.  Previous and current functional limitations:  (See Goal Flow Sheet for this information)    Short term and Long term goals: (See Goal Flow Sheet for this information)   Communication ability:  Patient appears to be able to clearly communicate and understand verbal and written communication and follow directions correctly.  Treatment Explanation - The following has been discussed with the patient:   RX ordered/plan of care  Anticipated outcomes  Possible risks and side effects  This patient would benefit from PT intervention to resume normal activities.   Rehab potential is good.    Frequency:  1 X week, once daily  Duration:  for 10 weeks  Discharge Plan:  Achieve all LTG.  Independent in home treatment program.  Reach maximal therapeutic benefit.    Thank you for your referral.    INQUIRIES  Therapist: Neo [facility] DPT [facility]  [address] [place] [address]  Phone: [phone]  Fax: [phone]

## 2017-09-18 NOTE — MR AVS SNAPSHOT
After Visit Summary   9/18/2017    Javi Salmeron    MRN: [id number]           Patient Information     Date Of Birth          1970        Visit Information        Provider Department      9/18/2017 10:20 AM Neo Dee PT [facility]        Today's Diagnoses     Neck pain    -  1       Follow-ups after your visit        Your next 10 appointments already scheduled     Sep 28, 2017  4:50 PM CDT   [facility] Spine with Neo Dee PT   [facility] [place] [facility] ([facility] [place])    [address]  [place] [address]   [phone]            Oct 02, 2017  4:30 PM CDT   GROUP with [place] [id number]   [facility] ([facility])    [address]  [address]  [facility] [address]   [phone]            Oct 03, 2017  4:10 PM CDT   [facility] Spine with Neo Dee PT   [facility] ([facility] [place])    [address]  [place] [address]   [phone]            Oct 17, 2017 10:30 AM CDT   (Arrive by 10:15 AM)   New Patient Visit with tM Becker MD   [facility] ([facility])    [address]  [address]  [facility] [address] [phone]            Nov 01, 2017  4:00 PM CDT   INDIVIDUAL THERAPY with Thomas Baugh, PhD    [facility] ([facility])    [address]  [address]  [facility] [address]   [phone]              Who to contact     If you have questions or need follow up information about today's clinic visit or your schedule please contact [facility] directly at [phone].  Normal or non-critical lab and imaging results will be communicated to you by MyChart, letter or phone within 4 business days after the clinic has received the results. If you do not hear from us within 7 days, please contact the clinic through MyChart or phone. If you have a critical or abnormal lab result, we will notify you by phone  "as soon as possible.  Submit refill requests through [employer] or call your pharmacy and they will forward the refill request to us. Please allow 3 business days for your refill to be completed.          Additional Information About Your Visit        [facility] Information     [facility] lets you send messages to your doctor, view your test results, renew your prescriptions, schedule appointments and more. To sign up, go to [email or web address] . Click on \"Log in\" on the left side of the screen, which will take you to the Welcome page. Then click on \"Sign up Now\" on the right side of the page.     You will be asked to enter the access code listed below, as well as some personal information. Please follow the directions to create your username and password.     Your access code is: [email or web address]  Expires: 2017  3:44 PM     Your access code will  in 90 days. If you need help or a new code, please call your [place] clinic or [phone].        Care EveryWhere ID     This is your Care EveryWhere ID. This could be used by other organizations to access your [place] medical records  [id number]         Blood Pressure from Last 3 Encounters:   16 134/88    Weight from Last 3 Encounters:   16 90.7 kg (200 lb)              We Performed the Following     [facility] PT EVAL, LOW COMPLEXITY     [facility] INITIAL EVAL REPORT     MANUAL THER TECH,1+REGIONS,EA 15 MIN     THERAPEUTIC EXERCISES        Primary Care Provider    None       No address on file        Equal Access to Services     ANSHUL PALMER : Hadii [place] ku cathleeno Sosundar, waaxda luqadaha, qaybta kaalmada adeegyada, ying carmona . So [facility] [phone].    ATENCIÓN: Si habla español, tiene a horton disposición servicios gratuitos de asistencia lingüística. Llgraciela al [phone].    We comply with applicable federal civil rights laws and [place] laws. We do not discriminate on the basis of race, color, national origin, age, disability sex, " sexual orientation or gender identity.            Thank you!     Thank you for choosing [facility]  for your care. Our goal is always to provide you with excellent care. Hearing back from our patients is one way we can continue to improve our services. Please take a few minutes to complete the written survey that you may receive in the mail after your visit with us. Thank you!             Your Updated Medication List - Protect others around you: Learn how to safely use, store and throw away your medicines at www.disposemymeds.org.          This list is accurate as of: 9/18/17 12:41 PM.  Always use your most recent med list.                   Brand Name Dispense Instructions for use Diagnosis    citalopram 40 MG tablet    celeXA    30 tablet    Take 1 tablet (40 mg) by mouth every morning    Depression       cyclobenzaprine 10 MG tablet    FLEXERIL    30 tablet    Take 1 tablet (10 mg) by mouth 3 times daily as needed for muscle spasms    Acute bilateral low back pain without sciatica, Lumbar strain, initial encounter       naltrexone 50 MG tablet    DEPADE;REVIA    90 tablet    Take 1 tablet (50 mg) by mouth every morning    Unspecified psychosexual disorder       OMEGA-3 FISH OIL PO      Take 1,200 mg by mouth daily        SYSTANE OP      Place 1 drop into both eyes 2 times daily

## 2017-10-03 ENCOUNTER — THERAPY VISIT (OUTPATIENT)
Dept: PHYSICAL THERAPY | Facility: CLINIC | Age: 47
End: 2017-10-03
Payer: COMMERCIAL

## 2017-10-03 DIAGNOSIS — M54.2 NECK PAIN: ICD-10-CM

## 2017-10-03 PROCEDURE — 97035 APP MDLTY 1+ULTRASOUND EA 15: CPT | Mod: GP | Performed by: PHYSICAL THERAPIST

## 2017-10-03 PROCEDURE — 97140 MANUAL THERAPY 1/> REGIONS: CPT | Mod: GP | Performed by: PHYSICAL THERAPIST

## 2017-10-03 PROCEDURE — 97110 THERAPEUTIC EXERCISES: CPT | Mod: GP | Performed by: PHYSICAL THERAPIST

## 2017-10-10 ENCOUNTER — THERAPY VISIT (OUTPATIENT)
Dept: PHYSICAL THERAPY | Facility: CLINIC | Age: 47
End: 2017-10-10
Payer: COMMERCIAL

## 2017-10-10 DIAGNOSIS — M54.2 NECK PAIN: ICD-10-CM

## 2017-10-10 PROCEDURE — 97112 NEUROMUSCULAR REEDUCATION: CPT | Mod: GP | Performed by: PHYSICAL THERAPIST

## 2017-10-10 PROCEDURE — 97035 APP MDLTY 1+ULTRASOUND EA 15: CPT | Mod: GP | Performed by: PHYSICAL THERAPIST

## 2017-10-10 PROCEDURE — 97140 MANUAL THERAPY 1/> REGIONS: CPT | Mod: GP | Performed by: PHYSICAL THERAPIST

## 2017-10-10 PROCEDURE — 97110 THERAPEUTIC EXERCISES: CPT | Mod: GP | Performed by: PHYSICAL THERAPIST

## 2017-10-17 ENCOUNTER — OFFICE VISIT (OUTPATIENT)
Dept: ORTHOPEDICS | Facility: CLINIC | Age: 47
End: 2017-10-17

## 2017-10-17 VITALS
DIASTOLIC BLOOD PRESSURE: 97 MMHG | BODY MASS INDEX: 27.5 KG/M2 | SYSTOLIC BLOOD PRESSURE: 144 MMHG | HEIGHT: 72 IN | WEIGHT: 203 LBS

## 2017-10-17 DIAGNOSIS — R07.9 CHEST PAIN OF UNKNOWN ETIOLOGY: ICD-10-CM

## 2017-10-17 DIAGNOSIS — M54.2 CERVICALGIA: Primary | ICD-10-CM

## 2017-10-17 PROBLEM — G80.8 OTHER CEREBRAL PALSY (H): Status: ACTIVE | Noted: 2017-10-17

## 2017-10-17 NOTE — TELEPHONE ENCOUNTER
Received Imaging From: [facility]    Image Type (x): Disc:_____  Pacs:__x___      Exam Date/Name: 8/22/17 MR L Shoulder  8/22/17 MR Cervical Spine  8/29/17 Epidural Cervical Inj Comments: Radiology reports received. Faxed reports to clinic and notified [facility] to pull imaging in Pacs

## 2017-10-17 NOTE — PATIENT INSTRUCTIONS
"-Obtain ECG. Discussed blood pressure. Recommended DASH diet and a few pounds of weight loss. Needs primary care follow up.    -Discussed a visit with [facility] or [facility] in [place] and [place], respectively.   -Also, may continue with [facility]  -Home cervical traction unit ordered    -Referred to Jd Pacheco MD to discuss C-spine MRI and to further evaluate and treat    -May follow [facility] with [facility] or at [facility]. Use [facility] if interested in Primary Care  "

## 2017-10-17 NOTE — PROGRESS NOTES
" Subjective:   Javi Salmeron is a 46 year old male who is here for neck pain since June 12, 2017.   He awoke with severe LEFT upper back pain that was severe and radiated down LEFT arm down to pinky side of left hand.   The pain felt like an ice-pick in LEFT upper back to anterior chest and had severe LEFT neck pain.    He took a few days off of work. Then went to chiropractor. No improvement.   Had MRIs at [facility].  Shoulder was in normal range  Neck had the following Conclusion:     \"Overall suboptimal exam, due to patient motion artifact. Disc degeneration is mild-moderate at  C5-6 and C6-7 and mild at C4-5 and C7-T1 through T2-3, with developmentally small central spinal canal from  C3-4 to C6-7 and these additional findings:  1. Large left C6-7 disc extrusion markedly narrows left central spinal canal and foramen/foraminal entrance zone,  markedly compresses left ventrolateral cord and pre-/intraforaminal left C7 root. Also, chronic moderate right  foraminal stenosis. Especially with reported upper extremity weakness, pain management/surgical consultations  recommended.  2. 2 mm AP central protrusion at C4-5, with mild central stenosis and cord impingement/indentation.  3. Additional chronic foraminal stenosis is moderate-severe on left/moderate on right at C3-4, moderate on  right/mild on left at C5-6, with left C4 root impingement, also mild central stenosis at C5-6.  10/17/2017 Printing  [email or web address]  4. No fracture, osseous neoplasm or infection and no intrinsic cord abnormality, including no syrinx, mass or  Myelomalacia.\"    Then had an Epidural into lower C-spine. No benefit with that after the Novacaine wore off.    Currently, he is having fatigue in the LEFT arm with intermittent sharp pains in shoulder.   Also, with stress his LEFT neck pain worsens.  In PT at [facility] which is helping slightly but symptoms remain. 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 Has had 2 surgeries.         Prior Evaluation: Prior Physician Evalutation: MORENO Baugh: [facility], Physical Therapy, Injection and chiropracter.....    PAST MEDICAL, SOCIAL, SURGICAL AND FAMILY HISTORY: He  has a past medical history of Esotropia and Myopia of both eyes.  He  has a past surgical history that includes Lasik bilateral (Bilateral, 4/2014); Eye surgery (Bilateral, 1/2014); and Eye surgery (Bilateral, age 12).  His family history is negative for Macular Degeneration and Glaucoma.  He reports that he has never smoked. He has never used smokeless tobacco. He reports that he drinks alcohol. He reports that he does not use illicit drugs.    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 Has a headset and getting an ergonomic assessment. Has a standing desk option    ALLERGIES: He is allergic to dust mites; grass; ragweeds; and codeine.    CURRENT MEDICATIONS: He has a current medication list which includes the following prescription(s): citalopram and naltrexone.     REVIEW OF SYSTEMS: Admits to LEFT sided chest pain. No shortness of breath. No fevers. N     Exam:   BP (!) 144/97  Ht 6' (1.829 m)  Wt 203 lb (92.1 kg)  BMI 27.53 kg/m2     Repeat 144/91    Appears well. Ambulates with an antalgic gait due to his CP.   CV-- RRR without murmur  Lungs - CTA    LEFT upper back with no redness, warmth or effusion.   Slightly limited ROM of neck to LEFT.   LEFT shoulder ROM is in normal range as is strength of RC and biceps.   Normal elbow ROM and hand ROM  Normal strength in hand, Ulnar, Median and Radial Nerve distributions. Intact sensation. Reflexes on Left are slightly more brisk than on Right.            Assessment/Plan:   LEFT sided C-spine nerve entrapment with pain in upper back and shoulder.   Also with chest pain.   Elevated blood pressure without past diagnosis of hypertension    -Obtain ECG. Discussed  "blood pressure. Recommended DASH diet and a few pounds of weight loss. Needs primary care follow up.    -Discussed a visit with [facility] or [facility] in [place] and [place], respectively.   -Also, may continue with [facility]  -Home cervical traction unit ordered    -Referred to Jd Pacheco MD to discuss C-spine MRI and to further evaluate and treat    -May follow up with me at [facility] or at [facility]. Use [facility] if interested in Primary Care    Over 45 minutes was spent with Mr. Salmeron, with over 50% of that in reviewing his past MRI results and discussing care options,  --Mt Becker MD  "

## 2017-10-17 NOTE — TELEPHONE ENCOUNTER
"APPT INFORMATION    Date /Time: 10/27/17 9:30AM   Reason for Appt: Cervicalgia   Ref Provider/Clinic: Dr. Wiliam Baugh   Patient Contact (Y/N) & Call Details: No - pt is referred   Action: Called [facility] and spoke to Tigist, she will fax reports and push imaging to [facility]     RECORDS CLINIC NAME  (\"None\" if no records ) RECEIVED RECS & IMG? Y/N   (may include other helpful notes)   Internal Clinics: [facility] Records in [facility] with Dr. Wiliam Baugh    [facility] PT notes are in [facility]    [facility] [place] Records from 2016 with Dr. Ambrosio Mclain in [facility]   External Clinics: [facility] Received             "

## 2017-10-17 NOTE — MR AVS SNAPSHOT
"              After Visit Summary   10/17/2017    Javi Salmeron    MRN: [id number]           Patient Information     Date Of Birth          1970        Visit Information        Provider Department      10/17/2017 10:30 AM Mt Becker MD [facility]        Today's Diagnoses     Cervicalgia    -  1    Chest pain of unknown etiology          Care Instructions    -Obtain ECG. Discussed blood pressure. Recommended DASH diet and a few pounds of weight loss. Needs primary care follow up.    -Discussed a visit with [facility] or [facility] in [place] and [place], respectively.   -Also, may continue with [facility]  -Home cervical traction unit ordered    -Referred to Jd Pacheco MD to discuss C-spine MRI and to further evaluate and treat    -May follow up with me at [facility] or at [facility]. Use [place] if interested in Primary Care          Follow-ups after your visit        Additional Services     ORTHOPEDICS ADULT REFERRAL       Your provider has referred you to: [facility]: Orthopaedic Clinic [facility] [phone]   [email or web address]  - To Jd Pacheco MD for C-Spine pain. Has MRI.      Please be aware that coverage of these services is subject to the terms and limitations of your health insurance plan.  Call member services at your health plan with any benefit or coverage questions.      Please bring the following to your appointment:    >>   Any x-rays, CTs or MRIs which have been performed.  Contact the facility where they were done to arrange for  prior to your scheduled appointment.    >>   List of current medications   >>   This referral request   >>   Any documents/labs given to you for this referral            PHYSICAL THERAPY REFERRAL (External-Prints)       Physical Therapy Referral to either [facility] in [place] or [facility] in [place]                  Your next 10 appointments already scheduled     Oct " 26, 2017  4:50 PM CDT   [facility] Spine with Neo Dee, PT   [facility] [facility] ([facility] [place]    [address]  [place] [address]   [phone]            Nov 01, 2017  4:00 PM CDT   INDIVIDUAL THERAPY with Thomas Baugh, PhD    [facility]    [address]  Mail [address] 7521  [facility] [address]   [phone]            Nov 02, 2017  4:50 PM CDT   [facility] Spine with Neo Dee, PT   [facility] ([facility] [place])    [address]  [place] [address]   [phone]            Nov 06, 2017  4:30 PM CST   GROUP with SE UMP CSB AC 2   [facility] ([facility]    [address]  [address]  [facility] [address]   [phone]            Dec 04, 2017  4:30 PM CST   GROUP with SE UMP CSB AC 2   [facility]    [address]  [address]  [facility] [address]   [phone]            Jan 01, 2018  4:30 PM CST   GROUP with [facility] CSB AC 2   [facility]    [address]  [address]  [facility] [address]   [phone]            Feb 05, 2018  4:30 PM CST   GROUP with SE UMP CSB AC 2   [facility]    [address]  [address]  [facility] [address]   [phone]              Who to contact     Please call your clinic at [phone] to:    Ask questions about your health    Make or cancel appointments    Discuss your medicines    Learn about your test results    Speak to your doctor   If you have compliments or concerns about an experience at your clinic, or if you wish to file a complaint, please contact [facility] Patient Relations at [phone] or email us at [email or web address]         Additional Information About Your Visit        Tagged Information     Tagged is an electronic gateway that provides easy, online access  to your medical records. With Villij, you can request a clinic appointment, read your test results, renew a prescription or communicate with your care team.     To sign up for Villij visit the website at [email or web address]   You will be asked to enter the access code listed below, as well as some personal information. Please follow the directions to create your username and password.     Your access code is: [email or web address]  Expires: 2017  3:44 PM     Your access code will  in 90 days. If you need help or a new code, please contact your [facility] or call [phone] for assistance.        Care EveryWhere ID     This is your Care EveryWhere ID. This could be used by other organizations to access your [place] medical records  [id number]        Your Vitals Were     Height BMI (Body Mass Index)                6' (1.829 m) 27.53 kg/m2           Blood Pressure from Last 3 Encounters:   10/17/17 (!) 144/97   16 134/88    Weight from Last 3 Encounters:   10/17/17 203 lb (92.1 kg)   16 200 lb (90.7 kg)              We Performed the Following     EKG 12-lead complete w/read - Clinics     ORTHOPEDICS ADULT REFERRAL     PHYSICAL THERAPY REFERRAL (External-Prints)          Today's Medication Changes          These changes are accurate as of: 10/17/17 10:59 AM.  If you have any questions, ask your nurse or doctor.               Start taking these medicines.        Dose/Directions    Cervical Traction Kit   Used for:  Cervicalgia        Use at home as needed and guided by Physical therapy   Quantity:  1 kit   Refills:  0         Stop taking these medicines if you haven't already. Please contact your care team if you have questions.     cyclobenzaprine 10 MG tablet   Commonly known as:  FLEXERIL           OMEGA-3 FISH OIL PO           SYSTANE OP                Where to get your medicines      These medications were sent to [facility] [place] -  [address] AT [facility], [place] [address]     Phone:  [phone]     Cervical Traction Kit                Primary Care Provider    None Specified       No primary provider on file.        Equal Access to Services     ANSHUL PALMER : Hadii [place] cat hadrosyo Soomaali, waaxda luqadaha, qaybta kaalmada adeegyada, ying smithjosechristiano lainez. So [facility] [phone].    ATENCIÓN: Si habla español, tiene a horton disposición servicios gratuitos de asistencia lingüística. Llame al [phone].    We comply with applicable federal civil rights laws and [place] laws. We do not discriminate on the basis of race, color, national origin, age, disability, sex, sexual orientation, or gender identity.            Thank you!     Thank you for choosing [facility]  for your care. Our goal is always to provide you with excellent care. Hearing back from our patients is one way we can continue to improve our services. Please take a few minutes to complete the written survey that you may receive in the mail after your visit with us. Thank you!             Your Updated Medication List - Protect others around you: Learn how to safely use, store and throw away your medicines at www.disposemymeds.org.          This list is accurate as of: 10/17/17 10:59 AM.  Always use your most recent med list.                   Brand Name Dispense Instructions for use Diagnosis    Cervical Traction Kit     1 kit    Use at home as needed and guided by Physical therapy    Cervicalgia       citalopram 40 MG tablet    celeXA    30 tablet    Take 1 tablet (40 mg) by mouth every morning    Depression       naltrexone 50 MG tablet    DEPADE;REVIA    90 tablet    Take 1 tablet (50 mg) by mouth every morning    Unspecified psychosexual disorder

## 2017-10-17 NOTE — LETTER
"  10/17/2017      RE: Javi Salmeron  [address]  [place] [address]        Subjective:   Javi Salmeron is a 46 year old male who is here for neck pain since June 12, 2017.   He awoke with severe LEFT upper back pain that was severe and radiated down LEFT arm down to pinky side of left hand.   The pain felt like an ice-pick in LEFT upper back to anterior chest and had severe LEFT neck pain.    He took a few days off of work. Then went to chiropractor. No improvement.   Had MRIs at [facility].  Shoulder was in normal range  Neck had the following Conclusion:     \"Overall suboptimal exam, due to patient motion artifact. Disc degeneration is mild-moderate at  C5-6 and C6-7 and mild at C4-5 and C7-T1 through T2-3, with developmentally small central spinal canal from  C3-4 to C6-7 and these additional findings:  1. Large left C6-7 disc extrusion markedly narrows left central spinal canal and foramen/foraminal entrance zone,  markedly compresses left ventrolateral cord and pre-/intraforaminal left C7 root. Also, chronic moderate right  foraminal stenosis. Especially with reported upper extremity weakness, pain management/surgical consultations  recommended.  2. 2 mm AP central protrusion at C4-5, with mild central stenosis and cord impingement/indentation.  3. Additional chronic foraminal stenosis is moderate-severe on left/moderate on right at C3-4, moderate on  right/mild on left at C5-6, with left C4 root impingement, also mild central stenosis at C5-6.  10/17/2017 Printing  [email or web address]  4. No fracture, osseous neoplasm or infection and no intrinsic cord abnormality, including no syrinx, mass or  Myelomalacia. \"    Then had an Epidural into lower C-spine. No benefit with that after the Novacaine wore off.    Currently, he is having fatigue in the LEFT arm with intermittent sharp pains in shoulder.   Also, with " stress his LEFT neck pain worsens.  In PT at [facility] which is helping slightly but symptoms remain.     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 Has had 2 surgeries.         Prior Evaluation: Prior Physician Evalutation: MORENO Baugh: CDI, Physical Therapy, Injection and chiropracter.....    PAST MEDICAL, SOCIAL, SURGICAL AND FAMILY HISTORY: He  has a past medical history of Esotropia and Myopia of both eyes.  He  has a past surgical history that includes Lasik bilateral (Bilateral, 4/2014); Eye surgery (Bilateral, 1/2014); and Eye surgery (Bilateral, age 12).  His family history is negative for Macular Degeneration and Glaucoma.  He reports that he has never smoked. He has never used smokeless tobacco. He reports that he drinks alcohol. He reports that he does not use illicit drugs.    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 Has a headset and getting an ergonomic assessment. Has a standing desk option    ALLERGIES: He is allergic to dust mites; grass; ragweeds; and codeine.    CURRENT MEDICATIONS: He has a current medication list which includes the following prescription(s): citalopram and naltrexone.     REVIEW OF SYSTEMS: Admits to LEFT sided chest pain. No shortness of breath. No fevers. N     Exam:   BP (!) 144/97  Ht 6' (1.829 m)  Wt 203 lb (92.1 kg)  BMI 27.53 kg/m2     Repeat 144/91    Appears well. Ambulates with an antalgic gait due to his CP.   CV-- RRR without murmur  Lungs - CTA    LEFT upper back with no redness, warmth or effusion.   Slightly limited ROM of neck to LEFT.   LEFT shoulder ROM is in normal range as is strength of RC and biceps.   Normal elbow ROM and hand ROM  Normal strength in hand, Ulnar, Median and Radial Nerve distributions. Intact sensation. Reflexes on Left are slightly more brisk than on Right.            Assessment/Plan:   LEFT sided C-spine nerve entrapment with pain in upper back and shoulder.   Also with chest  "pain.   Elevated blood pressure without past diagnosis of hypertension    -Obtain ECG. Discussed blood pressure. Recommended DASH diet and a few pounds of weight loss. Needs primary care follow up.    -Discussed a visit with [facility] or [facility] in [place] and [place], respectively.   -Also, may continue with [facility]  -Home cervical traction unit ordered    -Referred to Jd Pacheco MD to discuss C-spine MRI and to further evaluate and treat    -May follow up with me at [facility] or at [facility]. Use [place] if interested in Primary Care    Over 45 minutes was spent with Mr. Salmeron, with over 50% of that in reviewing his past MRI results and discussing care options,  --Mt Becker MD      "

## 2017-10-18 LAB — INTERPRETATION ECG - MUSE: NORMAL

## 2017-10-23 DIAGNOSIS — M54.2 NECK PAIN: Primary | ICD-10-CM

## 2017-10-27 ENCOUNTER — PRE VISIT (OUTPATIENT)
Dept: ORTHOPEDICS | Facility: CLINIC | Age: 47
End: 2017-10-27

## 2017-10-27 ENCOUNTER — OFFICE VISIT (OUTPATIENT)
Dept: ORTHOPEDICS | Facility: CLINIC | Age: 47
End: 2017-10-27

## 2017-10-27 VITALS — HEIGHT: 72 IN | WEIGHT: 198.5 LBS | BODY MASS INDEX: 26.89 KG/M2

## 2017-10-27 DIAGNOSIS — M54.12 CERVICAL RADICULOPATHY: Primary | ICD-10-CM

## 2017-10-27 ASSESSMENT — ENCOUNTER SYMPTOMS
WEIGHT LOSS: 1
FEVER: 0
STIFFNESS: 0
CHILLS: 0
NERVOUS/ANXIOUS: 1
DEPRESSION: 0
NIGHT SWEATS: 0
DECREASED APPETITE: 0
HALLUCINATIONS: 0
JOINT SWELLING: 0
ALTERED TEMPERATURE REGULATION: 0
MUSCLE CRAMPS: 1
POLYPHAGIA: 0
FATIGUE: 1
DECREASED CONCENTRATION: 0
BACK PAIN: 1
POLYDIPSIA: 0
MYALGIAS: 1
INCREASED ENERGY: 1
ARTHRALGIAS: 0
NECK PAIN: 1
WEIGHT GAIN: 0
PANIC: 0
INSOMNIA: 1
MUSCLE WEAKNESS: 1

## 2017-10-27 NOTE — MR AVS SNAPSHOT
After Visit Summary   10/27/2017    Javi Salmeron    MRN: [id number]           Patient Information     Date Of Birth          1970        Visit Information        Provider Department      10/27/2017 9:30 AM Marcus Rodriguez MD [facility]        Today's Diagnoses     Cervical radiculopathy    -  1       Follow-ups after your visit        Your next 10 appointments already scheduled     Nov 01, 2017  4:00 PM CDT   INDIVIDUAL THERAPY with Thomas Baugh, PhD    [facility] ([facility])    [address]  [address] 7521  [facility] [address]   [phone]            Nov 03, 2017  4:50 PM CDT   [facility] Spine with Noe Dee, PT   [facility] ([facility] [place])    [address]  [place] [address]   [phone]            Nov 06, 2017  4:30 PM CST   GROUP with SE UMP CSB AC 2   [facility] ([facility])    [address]  [address]  [facility] [address]   [phone]            Dec 04, 2017  4:30 PM CST   GROUP with SE UMP CSB AC 2   [facility] ([facility])    [address]  [address]  [facility] [address]   [phone]            Jan 01, 2018  4:30 PM CST   GROUP with SE UMP CSB AC 2   [facility] ([facility])    [address]  [address]  [facility] [address]   [phone]            Feb 05, 2018  4:30 PM CST   GROUP with SE UMP CSB AC 2   [facility] ([facility])    [address]  [address]  [facility] [address]   [phone]              Future tests that were ordered for you today     Open Future Orders        Priority Expected Expires Ordered    Nerve block root, cervical/thoracic, left single Routine  10/27/2018 10/27/2017            Who to contact     Please call your clinic at [phone] to:    Ask questions about your health    Make or cancel  appointments    Discuss your medicines    Learn about your test results    Speak to your doctor   If you have compliments or concerns about an experience at your clinic, or if you wish to file a complaint, please contact [facility] Patient Relations at [phone] or email us at [email or web address]         Additional Information About Your Visit        ITADSecurityhart Information     CondoGalat is an electronic gateway that provides easy, online access to your medical records. With Mobivity, you can request a clinic appointment, read your test results, renew a prescription or communicate with your care team.     To sign up for Mobivity visit the website at [email or web address]   You will be asked to enter the access code listed below, as well as some personal information. Please follow the directions to create your username and password.     Your access code is: [email or web address]  Expires: 2017  3:44 PM     Your access code will  in 90 days. If you need help or a new code, please contact your [facility] or call [phone] for assistance.        Care EveryWhere ID     This is your Care EveryWhere ID. This could be used by other organizations to access your [place] medical records  [id number]        Your Vitals Were     Height BMI (Body Mass Index)                1.829 m (6') 26.92 kg/m2           Blood Pressure from Last 3 Encounters:   10/17/17 (!) 144/97   16 134/88    Weight from Last 3 Encounters:   10/27/17 90 kg (198 lb 8 oz)   10/17/17 92.1 kg (203 lb)   16 90.7 kg (200 lb)               Primary Care Provider Office Phone # Fax #    Samy Rendon [phone]       [facility] [address]  [facility] [address]        Equal Access to Services     ANSHUL PALMER AH: Alexis connollyo Sosundar, waaxda luqadaha, qaybta kaalmada adeegyajaren, ying lainez. So [facility]  [phone].    ATENCIÓN: Si molly abdul, tiene a horton disposición servicios gratuitos de asistencia lingüística. Tommy samson [phone].    We comply with applicable federal civil rights laws and [place] laws. We do not discriminate on the basis of race, color, national origin, age, disability, sex, sexual orientation, or gender identity.            Thank you!     Thank you for choosing [facility]  for your care. Our goal is always to provide you with excellent care. Hearing back from our patients is one way we can continue to improve our services. Please take a few minutes to complete the written survey that you may receive in the mail after your visit with us. Thank you!             Your Updated Medication List - Protect others around you: Learn how to safely use, store and throw away your medicines at www.disposemymeds.org.          This list is accurate as of: 10/27/17 10:37 AM.  Always use your most recent med list.                   Brand Name Dispense Instructions for use Diagnosis    Cervical Traction Kit     1 kit    Use at home as needed and guided by Physical therapy    Cervicalgia       citalopram 40 MG tablet    celeXA    30 tablet    Take 1 tablet (40 mg) by mouth every morning    Depression       naltrexone 50 MG tablet    DEPADE;REVIA    90 tablet    Take 1 tablet (50 mg) by mouth every morning    Unspecified psychosexual disorder

## 2017-10-27 NOTE — NURSING NOTE
Reason For Visit:   Chief Complaint   Patient presents with     Neck Pain     Patient was referred by Dr. Becker for evaluation of cervical spine pain radiating to left upper extremity.       Primary MD: Samy Rendno  Ref. MD: Dr. Becker    ?  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  Work status?  Full time.  Date of injury: none  Date of surgery: none  Smoker: No      Ht 1.829 m (6')  Wt 90 kg (198 lb 8 oz)  BMI 26.92 kg/m2    Pain Assessment  Patient Currently in Pain: Yes  0-10 Pain Scale: 4  Primary Pain Location: Neck  Pain Descriptors: Numbness, Aching, Tightness  Alleviating Factors: Heat  Aggravating Factors: Bending, Other (comment) (computer work)        Neck Disability Index (NDI) Questionnaire    Neck Disability Index (NDI) 10/27/2017   SECTION 1 - PAIN INTENSITY The pain is moderate at the moment.   SECTION 2 - PERSONAL CARE I can look after myself normally without causing extra neck pain.   SECTION 3 - LIFTING I can lift heavy weights, but it gives me extra neck pain.   SECTION 4 - READING I can read as much as I want with slight neck pain.   SECTION 5 - HEADACHES I have slight headaches that come infrequently.   SECTION 6 - CONCENTRATION I can concentrate fully without difficulty.   SECTION 7 - WORK I can do as much work as I want.   SECTION 8 - DRIVING I can drive my car with only slight neck pain.   SECTION 9 - SLEEPING My sleep is slightly disturbed for less than 1 hour.   SECTION 10 - RECREATION I am able to engage in all my recreational activities with some neck pain.   Neck Disability Index: Count 10   NDI: Total Score = SUM (points for all 10 findings) 8   Neck Disability in Percent = (Total Score) / 50 * 100 16 (%)          Visual Analog Pain Scale  Neck Pain Scale 0-10: 2  Right arm pain: 0  Left arm pain: 2    Promis 10 Assessment    PROMIS 10 10/27/2017   In general, would you say your health is: Good   In general, would you say your quality of life is: Very good    In general, how would you rate your physical health? Good   In general, how would you rate your mental health, including your mood and your ability to think? Very good   In general, how would you rate your satisfaction with your social activities and relationships? Very good   In general, please rate how well you carry out your usual social activities and roles Good   To what extent are you able to carry out your everyday physical activities such as walking, climbing stairs, carrying groceries, or moving a chair? Completely   How often have you been bothered by emotional problems such as feeling anxious, depressed or irritable? Rarely   How would you rate your fatigue on average? Moderate   How would you rate your pain on average?   0 = No Pain  to  10 = Worst Imaginable Pain 4   In general, would you say your health is: 3   In general, would you say your quality of life is: 4   In general, how would you rate your physical health? 3   In general, how would you rate your mental health, including your mood and your ability to think? 4   In general, how would you rate your satisfaction with your social activities and relationships? 4   In general, please rate how well you carry out your usual social activities and roles. (This includes activities at home, at work and in your community, and responsibilities as a parent, child, spouse, employee, friend, etc.) 3   To what extent are you able to carry out your everyday physical activities such as walking, climbing stairs, carrying groceries, or moving a chair? 5   In the past 7 days, how often have you been bothered by emotional problems such as feeling anxious, depressed, or irritable? 2   In the past 7 days, how would you rate your fatigue on average? 3   In the past 7 days, how would you rate your pain on average, where 0 means no pain, and 10 means worst imaginable pain? 4   Global Mental Health Score 16   Global Physical Health Score 14   PROMIS TOTAL - SUBSCORES 30    Some recent data might be hidden        Jody Bellamy LPN

## 2017-10-27 NOTE — LETTER
"10/27/2017       RE: Javi Salmeron  [address] [facility] [address]     Dear Colleague,    Thank you for referring your patient, Javi Salmeron, to the [facility] at [facility]. Please see a copy of my visit note below.    Spine Surgery Consultation    REFERRING PHYSICIAN: Mt Becker   PRIMARY CARE PHYSICIAN: [facility]           Chief Complaint:   Neck Pain (Patient was referred by Dr. Becker for evaluation of cervical spine pain radiating to left upper extremity.)      History of Present Illness:  Symptom Profile Including: location of symptoms, onset, severity, exacerbating/alleviating factors, previous treatments:        Javi Salmeron is a 47 year old male who is referred for evaluation of a left C7 radiculopathy which has been worsening since August of this year.    It is a burning and tingling-type pain that goes into the left shoulder blade and down the back of the left arm towards the hand. The hand feels numb and tingly. He feels like the arm has been getting weaker. He has tried oral anti-inflammatories, chiropractic care, physical therapy. He had an interlaminar cervical epidural injection on August 29, 2017. This injection didn't help him very much.         Past Medical History:     Past Medical History:   Diagnosis Date     Depressive disorder      Esotropia      Hepatitis      Hypertension      Myopia of both eyes     High myopia     RA (rheumatoid arthritis) (H)             Past Surgical History:     Past Surgical History:   Procedure Laterality Date     EYE SURGERY Bilateral 1/2014    For \"lazy eye\" - primarily left esotropia but both eyes needed surgery     EYE SURGERY Bilateral age 12    for lazy eyes     LASIK BILATERAL Bilateral 4/2014    Dr. Martinez            Social History:     Social History   Substance Use Topics     Smoking status: Never Smoker     Smokeless tobacco: Never Used     Alcohol use Yes " "     Comment: one or two a month            Family History:     Family History   Problem Relation Age of Onset     Anxiety Disorder Maternal Grandfather      MENTAL ILLNESS Father      Depression Father      Obesity Father      Alcoholism Father      Depression Mother      Hyperlipidemia Mother      DIABETES Paternal Grandfather      CEREBROVASCULAR DISEASE Paternal Grandfather      Macular Degeneration No family hx of      Glaucoma No family hx of             Allergies:     Allergies   Allergen Reactions     Dust Mites      Grass      Ragweeds      Codeine Anxiety     \"gets loopy\" - doesn't like how it feels            Medications:     Current Outpatient Prescriptions   Medication     citalopram (CELEXA) 40 MG tablet     naltrexone (DEPADE;REVIA) 50 MG tablet     Misc. Devices (CERVICAL TRACTION) KIT     No current facility-administered medications for this visit.              Review of Systems:     A 10 point ROS was performed and reviewed. Specific responses to these questions are noted at the end of the document.         Physical Exam:   Vitals: Ht 1.829 m (6')  Wt 90 kg (198 lb 8 oz)  BMI 26.92 kg/m2  Constitutional: awake, alert, cooperative, no apparent distress, appears stated age.    Eyes: The sclera are white.  Ears, Nose, Throat: The trachea is midline.  Psychiatric: The patient has a normal affect.  Respiratory: breathing non-labored  Cardiovascular: The extremities are warm and perfused.  Skin: no obvious rashes or lesions.  Musculoskeletal, Neurologic, and Spine:   The bilateral upper extremities are examined. He has 5 out of 5 strength in the bilateral deltoids and 5 out of 5 in the bilateral biceps. 3 of 5 left triceps. 5 out of 5 on the right side. 3 out of 5 left intrinsic strength. 5 out of 5 on the right side. 4-5 left wrist flexion, 5 out of 5 on the right side. Sensation diminished left C7 distribution. He has an absent left triceps reflex. 3+ bicep and triceps reflex on the right side. " Negative Jessica sign bilaterally. Positive left-sided Spurling sign.         Imaging:   We ordered and independently reviewed new radiographs at this clinic visit. The results were discussed with the patient.  Findings include:    New AP lateral standing flexion-extension cervical radiographs were ordered and reviewed today do show some mild diffuse spondylosis worst at C6 7 where there is a large anterior osteophyte. No obvious dynamic instability or fractures.    August 22, 2017 shoulder MRI: Shows no severe changes in the rotator cuff or labrum.    August 22, 2017 cervical MRI: This study is severely limited by motion artifact and it makes it very difficult to assess the foraminal stenosis. On the left side at C6 7 there is a large disc herniation which does indent the cord and causes severe stenosis of the exiting left C7 nerve root. Broad-based disc bulge and uncal spurring does cause right-sided C5 6 foraminal stenosis. Central disc bulge at C4 5 does touch the anterior spinal cord and causes some effacement.             Assessment and Plan:   Assessment:  47 year old male with left arm radiculopathy in a C7 distribution.     Plan:  1. We discussed options moving forward. At this time his NDI is only 16. He does not yet feel that he has exhausted conservative management. We discussed trying gabapentin but he is very concerned about being sedated. He is artery done physical therapy, chiropractic care and oral anti-inflammatories. His cervical epidural did not help very much.  2. I recommended he try a left 7 selective nerve root injection. He is going to have this done and then return to clinic to discuss the results 2 weeks afterwards.  3. If he were to proceed with surgery we would likely recommend either an anterior cervical fusion or an anterior cervical disc replacement. I gave him information about both surgeries and he is going to read about them on spine [email or web address].  4. We briefly discussed the risks  and benefits of surgery including risk of infection, risk of recurrent laryngeal nerve injury, risk of vertebral artery injury, risk of dysphagia or dysphonia, risk of nonunion or hardware complications. In particular, he does quite a bit of singing for recreation and he was worried about the risk of vocal cord change. He is going to read more about this and think about it.      Again, thank you for allowing me to participate in the care of your patient.      Sincerely,    Marcus Rodriguez MD

## 2017-10-27 NOTE — PROGRESS NOTES
"Spine Surgery Consultation    REFERRING PHYSICIAN: Mt Becker   PRIMARY CARE PHYSICIAN: [facility]           Chief Complaint:   Neck Pain (Patient was referred by Dr. Becker for evaluation of cervical spine pain radiating to left upper extremity.)      History of Present Illness:  Symptom Profile Including: location of symptoms, onset, severity, exacerbating/alleviating factors, previous treatments:        Javi Salmeron is a 47 year old male who is referred for evaluation of a left C7 radiculopathy which has been worsening since August of this year.    It is a burning and tingling-type pain that goes into the left shoulder blade and down the back of the left arm towards the hand. The hand feels numb and tingly. He feels like the arm has been getting weaker. He has tried oral anti-inflammatories, chiropractic care, physical therapy. He had an interlaminar cervical epidural injection on August 29, 2017. This injection didn't help him very much.         Past Medical History:     Past Medical History:   Diagnosis Date     Depressive disorder      Esotropia      Hepatitis      Hypertension      Myopia of both eyes     High myopia     RA (rheumatoid arthritis) (H)             Past Surgical History:     Past Surgical History:   Procedure Laterality Date     EYE SURGERY Bilateral 1/2014    For \"lazy eye\" - primarily left esotropia but both eyes needed surgery     EYE SURGERY Bilateral age 12    for lazy eyes     LASIK BILATERAL Bilateral 4/2014    Dr. Martinez            Social History:     Social History   Substance Use Topics     Smoking status: Never Smoker     Smokeless tobacco: Never Used     Alcohol use Yes      Comment: one or two a month            Family History:     Family History   Problem Relation Age of Onset     Anxiety Disorder Maternal Grandfather      MENTAL ILLNESS Father      Depression Father      Obesity Father      Alcoholism Father      Depression Mother      Hyperlipidemia " "Mother      DIABETES Paternal Grandfather      CEREBROVASCULAR DISEASE Paternal Grandfather      Macular Degeneration No family hx of      Glaucoma No family hx of             Allergies:     Allergies   Allergen Reactions     Dust Mites      Grass      Ragweeds      Codeine Anxiety     \"gets loopy\" - doesn't like how it feels            Medications:     Current Outpatient Prescriptions   Medication     citalopram (CELEXA) 40 MG tablet     naltrexone (DEPADE;REVIA) 50 MG tablet     Misc. Devices (CERVICAL TRACTION) KIT     No current facility-administered medications for this visit.              Review of Systems:     A 10 point ROS was performed and reviewed. Specific responses to these questions are noted at the end of the document.         Physical Exam:   Vitals: Ht 1.829 m (6')  Wt 90 kg (198 lb 8 oz)  BMI 26.92 kg/m2  Constitutional: awake, alert, cooperative, no apparent distress, appears stated age.    Eyes: The sclera are white.  Ears, Nose, Throat: The trachea is midline.  Psychiatric: The patient has a normal affect.  Respiratory: breathing non-labored  Cardiovascular: The extremities are warm and perfused.  Skin: no obvious rashes or lesions.  Musculoskeletal, Neurologic, and Spine:   The bilateral upper extremities are examined. He has 5 out of 5 strength in the bilateral deltoids and 5 out of 5 in the bilateral biceps. 3 of 5 left triceps. 5 out of 5 on the right side. 3 out of 5 left intrinsic strength. 5 out of 5 on the right side. 4-5 left wrist flexion, 5 out of 5 on the right side. Sensation diminished left C7 distribution. He has an absent left triceps reflex. 3+ bicep and triceps reflex on the right side. Negative Jessica sign bilaterally. Positive left-sided Spurling sign.         Imaging:   We ordered and independently reviewed new radiographs at this clinic visit. The results were discussed with the patient.  Findings include:    New AP lateral standing flexion-extension cervical " radiographs were ordered and reviewed today do show some mild diffuse spondylosis worst at C6 7 where there is a large anterior osteophyte. No obvious dynamic instability or fractures.    August 22, 2017 shoulder MRI: Shows no severe changes in the rotator cuff or labrum.    August 22, 2017 cervical MRI: This study is severely limited by motion artifact and it makes it very difficult to assess the foraminal stenosis. On the left side at C6 7 there is a large disc herniation which does indent the cord and causes severe stenosis of the exiting left C7 nerve root. Broad-based disc bulge and uncal spurring does cause right-sided C5 6 foraminal stenosis. Central disc bulge at C4 5 does touch the anterior spinal cord and causes some effacement.             Assessment and Plan:   Assessment:  47 year old male with left arm radiculopathy in a C7 distribution.     Plan:  1. We discussed options moving forward. At this time his NDI is only 16. He does not yet feel that he has exhausted conservative management. We discussed trying gabapentin but he is very concerned about being sedated. He is artery done physical therapy, chiropractic care and oral anti-inflammatories. His cervical epidural did not help very much.  2. I recommended he try a left 7 selective nerve root injection. He is going to have this done and then return to clinic to discuss the results 2 weeks afterwards.  3. If he were to proceed with surgery we would likely recommend either an anterior cervical fusion or an anterior cervical disc replacement. I gave him information about both surgeries and he is going to read about them on spine [employer]  4. We briefly discussed the risks and benefits of surgery including risk of infection, risk of recurrent laryngeal nerve injury, risk of vertebral artery injury, risk of dysphagia or dysphonia, risk of nonunion or hardware complications. In particular, he does quite a bit of singing for recreation and he was worried  about the risk of vocal cord change. He is going to read more about this and think about it.      Respectfully,  Marcus Rodriguez MD  Spine Surgery  [facility]      Answers for HPI/ROS submitted by the patient on 10/27/2017   General Symptoms: Yes  Skin Symptoms: No  HENT Symptoms: No  EYE SYMPTOMS: No  HEART SYMPTOMS: No  LUNG SYMPTOMS: No  INTESTINAL SYMPTOMS: No  URINARY SYMPTOMS: No  REPRODUCTIVE SYMPTOMS: No  SKELETAL SYMPTOMS: Yes  BLOOD SYMPTOMS: No  NERVOUS SYSTEM SYMPTOMS: No  MENTAL HEALTH SYMPTOMS: Yes  Fever: No  Loss of appetite: No  Weight loss: Yes  Weight gain: No  Fatigue: Yes  Night sweats: No  Chills: No  Increased stress: Yes  Excessive hunger: No  Excessive thirst: No  Feeling hot or cold when others believe the temperature is normal: No  Loss of height: No  Post-operative complications: No  Surgical site pain: No  Hallucinations: No  Change in or Loss of Energy: Yes  Hyperactivity: No  Confusion: No  Back pain: Yes  Muscle aches: Yes  Neck pain: Yes  Swollen joints: No  Joint pain: No  Bone pain: No  Muscle cramps: Yes  Muscle weakness: Yes  Joint stiffness: No  Bone fracture: No  Nervous or Anxious: Yes  Depression: No  Trouble sleeping: Yes  Trouble thinking or concentrating: No  Mood changes: No  Panic attacks: No

## 2017-11-01 ENCOUNTER — OFFICE VISIT (OUTPATIENT)
Dept: OTHER | Facility: OUTPATIENT CENTER | Age: 47
End: 2017-11-01

## 2017-11-01 DIAGNOSIS — F91.8 CONDUCT DISORDER, UNDIFFERENTIATED TYPE: Primary | ICD-10-CM

## 2017-11-01 DIAGNOSIS — F34.1 DYSTHYMIC DISORDER: ICD-10-CM

## 2017-11-01 NOTE — MR AVS SNAPSHOT
After Visit Summary   11/1/2017    Javi Salmeron    MRN: [id number]           Patient Information     Date Of Birth          1970        Visit Information        Provider Department      11/1/2017 4:00 PM [facility] Baugh, PhD [facility]        Today's Diagnoses     Conduct disorder, undifferentiated type    -  1    Dysthymic disorder           Follow-ups after your visit        Your next 10 appointments already scheduled     Nov 24, 2017  4:50 PM CST   [facility] with Neo Dee, PT   [facility] [place]    [address]  [place] [address]   [phone]            Dec 04, 2017  4:30 PM CST   GROUP with SE P CSB AC 2   [facility] ([facility])    [address]  [address]  [facility] [address]   [phone]            Jan 01, 2018  4:30 PM CST   GROUP with SE UMP CSB AC 2   [facility] ([facility])    [address]  [address]  [facility] [address]   [phone]            Fernie 10, 2018  4:00 PM CST   INDIVIDUAL THERAPY with Thomas Baugh, PhD    [facility] ([facility])    [address]  [address]  [facility] [address]   [phone]            Feb 05, 2018  4:30 PM CST   GROUP with SE P CSB AC 2   [place] [facility] ([facility])    [address]  [address]  [facility] [address]   [phone]              Who to contact     Please call your clinic at [phone] to:    Ask questions about your health    Make or cancel appointments    Discuss your medicines    Learn about your test results    Speak to your doctor   If you have compliments or concerns about an experience at your clinic, or if you wish to file a complaint, please contact [facility] Patient Relations at [phone] or email us at [email or web address]         Additional Information About  Your Visit        [employer] Information     [employer] is an electronic gateway that provides easy, online access to your medical records. With [employer], you can request a clinic appointment, read your test results, renew a prescription or communicate with your care team.     To sign up for [employer] visit the website at [email or web address]   You will be asked to enter the access code listed below, as well as some personal information. Please follow the directions to create your username and password.     Your access code is: [email or web address]  Expires: 2017  2:44 PM     Your access code will  in 90 days. If you need help or a new code, please contact your [facility] or call [phone] for assistance.        Care EveryWhere ID     This is your Care EveryWhere ID. This could be used by other organizations to access your [facility] medical records  [id number]         Blood Pressure from Last 3 Encounters:   10/17/17 (!) 144/97   16 134/88    Weight from Last 3 Encounters:   10/27/17 90 kg (198 lb 8 oz)   10/17/17 92.1 kg (203 lb)   16 90.7 kg (200 lb)              We Performed the Following     Individual Psychotherapy (38-52 min) [11404]        Primary Care Provider Office Phone # Fax #    Samy Rendon [phone]       [facility] [address]  [facility] [address]        Equal Access to Services     [facility] HospitalREBECCA : Hadii aad ku hadasho Soomaali, waaxda luqadaha, qaybta kaalmada adeegyada, ying carmona . So [place] -[phone].    ATENCIÓN: Si habla español, tiene a horton disposición servicios gratuitos de asistencia lingüística. Llame al [phone].    We comply with applicable federal civil rights laws and [place] laws. We do not discriminate on the basis of race, color, national origin, age, disability, sex, sexual orientation, or gender identity.            Thank you!     Thank you for choosing [facility]  for your care. Our goal is always to provide you with excellent care. Hearing back from our patients is one way we can continue to improve our services. Please take a few minutes to complete the written survey that you may receive in the mail after your visit with us. Thank you!             Your Updated Medication List - Protect others around you: Learn how to safely use, store and throw away your medicines at www.disposemymeds.org.          This list is accurate as of: 11/1/17 11:59 PM.  Always use your most recent med list.                   Brand Name Dispense Instructions for use Diagnosis    Cervical Traction Kit     1 kit    Use at home as needed and guided by Physical therapy    Cervicalgia       citalopram 40 MG tablet    celeXA    30 tablet    Take 1 tablet (40 mg) by mouth every morning    Depression       naltrexone 50 MG tablet    DEPADE;REVIA    90 tablet    Take 1 tablet (50 mg) by mouth every morning    Unspecified psychosexual disorder

## 2017-11-01 NOTE — PROGRESS NOTES
[facility]  Case Progress Note    11/01/17    Client Name: Javi Salmeron  YOB: 1970  MRN:  [id number]  Treating Provider: Connie Baugh, PhD  Type of Session: Individual  Number of Minutes: 45    Current Symptoms/Status:  Had a better month.  Continues to have difficulty with son - triggering him. Risk situation  No boundary violations.     Progress Toward Treatment Goals:   Using support system.   Less stressed  about son who has been acting out.    Intervention: Modality and Description:  CBT, interpersonal    Response to Intervention:Doing much better with son.  He is not being triggered as much.  Health problems exacerbate condition.  But he is managing.     Assignment:  Stay within boundaries.  Read Mervat's book.    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  Return for aftercare support group.

## 2017-11-03 ENCOUNTER — THERAPY VISIT (OUTPATIENT)
Dept: PHYSICAL THERAPY | Facility: CLINIC | Age: 47
End: 2017-11-03
Payer: COMMERCIAL

## 2017-11-03 DIAGNOSIS — M54.2 NECK PAIN: ICD-10-CM

## 2017-11-03 PROCEDURE — 97140 MANUAL THERAPY 1/> REGIONS: CPT | Mod: GP | Performed by: PHYSICAL THERAPIST

## 2017-11-03 PROCEDURE — 97112 NEUROMUSCULAR REEDUCATION: CPT | Mod: GP | Performed by: PHYSICAL THERAPIST

## 2017-11-06 ENCOUNTER — OFFICE VISIT (OUTPATIENT)
Dept: OTHER | Facility: OUTPATIENT CENTER | Age: 47
End: 2017-11-06

## 2017-11-06 DIAGNOSIS — Z51.89 AFTERCARE: Primary | ICD-10-CM

## 2017-11-06 NOTE — MR AVS SNAPSHOT
After Visit Summary   11/6/2017    Javi Salmeron    MRN: [id number]           Patient Information     Date Of Birth          1970        Visit Information        Provider Department      11/6/2017 4:30 PM SE [facility] AC [facility]        Today's Diagnoses     Aftercare    -  1       Follow-ups after your visit        Your next 10 appointments already scheduled     Nov 17, 2017  5:30 PM CST   [facility] Spine with Neo Dee, PT   [facility] ([facility])    [address]  [place] [address]   [phone]            Dec 04, 2017  4:30 PM CST   GROUP with SE P CSB AC 2   [facility] ([facility])    [address]  [address] 75[address]  [facility] [address]   [phone]            Jan 01, 2018  4:30 PM CST   GROUP with SE P CSB AC 2   [place] [facility] ([facility])    [address]  [address]  [facility] [address]   [phone]            Fernie 10, 2018  4:00 PM CST   INDIVIDUAL THERAPY with Thomas Baugh, PhD    [facility] ([facility])    [address]  [address]  [facility] [address]   [phone]            Feb 05, 2018  4:30 PM CST   [facility]   [place] [facility] ([facility])    [address]  [address]  [facility] [address]   [phone]              Who to contact     Please call your clinic at [phone] to:    Ask questions about your health    Make or cancel appointments    Discuss your medicines    Learn about your test results    Speak to your doctor   If you have compliments or concerns about an experience at your clinic, or if you wish to file a complaint, please contact [facility] Patient Relations at [phone] or email us at [email or web address]         Additional Information About Your Visit        MyChart Information     Entredahart is an electronic  gateway that provides easy, online access to your medical records. With MDVIP, you can request a clinic appointment, read your test results, renew a prescription or communicate with your care team.     To sign up for MDVIP visit the website at [email or web address]   You will be asked to enter the access code listed below, as well as some personal information. Please follow the directions to create your username and password.     Your access code is: [email or web address]  Expires: 2017  2:44 PM     Your access code will  in 90 days. If you need help or a new code, please contact your [facility] or call [phone] for assistance.        Care EveryWhere ID     This is your Care EveryWhere ID. This could be used by other organizations to access your [place] medical records  [id number]         Blood Pressure from Last 3 Encounters:   10/17/17 (!) 144/97   16 134/88    Weight from Last 3 Encounters:   10/27/17 90 kg (198 lb 8 oz)   10/17/17 92.1 kg (203 lb)   16 90.7 kg (200 lb)              We Performed the Following     Support Group - 120 Min. [22085.039]        Primary Care Provider Office Phone # Fax #    Samy Duckworthvirgen [phone]       [facility] [address]  [facility] [address]        Equal Access to Services     HELENA PALMER : Hadii aad ku hadasho Soomaali, waaxda luqadaha, qaybta kaalmada adeegyada, waxay idiin haysiadan nghia carmona . So [facility] [phone].    ATENCIÓN: Si habla español, tiene a horton disposición servicios gratuitos de asistencia lingüística. Llgraciela al [phone].    We comply with applicable federal civil rights laws and [place] laws. We do not discriminate on the basis of race, color, national origin, age, disability, sex, sexual orientation, or gender identity.            Thank you!     Thank you for choosing [facility]  for your care. Our goal is always to provide you with excellent  care. Hearing back from our patients is one way we can continue to improve our services. Please take a few minutes to complete the written survey that you may receive in the mail after your visit with us. Thank you!             Your Updated Medication List - Protect others around you: Learn how to safely use, store and throw away your medicines at www.disposemymeds.org.          This list is accurate as of: 11/6/17  6:31 PM.  Always use your most recent med list.                   Brand Name Dispense Instructions for use Diagnosis    Cervical Traction Kit     1 kit    Use at home as needed and guided by Physical therapy    Cervicalgia       citalopram 40 MG tablet    celeXA    30 tablet    Take 1 tablet (40 mg) by mouth every morning    Depression       naltrexone 50 MG tablet    DEPADE;REVIA    90 tablet    Take 1 tablet (50 mg) by mouth every morning    Unspecified psychosexual disorder

## 2017-11-07 ENCOUNTER — TRANSFERRED RECORDS (OUTPATIENT)
Dept: HEALTH INFORMATION MANAGEMENT | Facility: CLINIC | Age: 47
End: 2017-11-07

## 2017-11-07 NOTE — PROGRESS NOTES
[place] [facility]  Case Progress Note    11/06/17    Client Name: Javi Salmeron  YOB: 1970  MRN:  [id number]  Treating Provider: Connie Baugh, PhD  Type of Session: Aftercare Group  Number of Minutes: 120    Current Symptoms/Status:  Had a good month despite significant problems with health.    Progress Toward Treatment Goals:   Using support system.      Intervention: Modality and Description:  Aftercare support group.    Response to Intervention:  Thinks he is going better with son.  Stressful dealing with health issues - but managing pretty well.    Assignment:  Stay within boundaries.  Read Mervat's book.    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  Return for aftercare support group.

## 2017-11-24 ENCOUNTER — THERAPY VISIT (OUTPATIENT)
Dept: PHYSICAL THERAPY | Facility: CLINIC | Age: 47
End: 2017-11-24
Payer: COMMERCIAL

## 2017-11-24 DIAGNOSIS — M54.2 NECK PAIN: ICD-10-CM

## 2017-11-24 PROCEDURE — 97140 MANUAL THERAPY 1/> REGIONS: CPT | Mod: GP | Performed by: PHYSICAL THERAPIST

## 2017-11-24 PROCEDURE — 97110 THERAPEUTIC EXERCISES: CPT | Mod: GP | Performed by: PHYSICAL THERAPIST

## 2017-11-24 NOTE — LETTER
[facility]  [address] [place] [address]  [phone]    2017    Re: Javi Salmeron   :   1970  MRN:  [id number]   REFERRING PHYSICIAN:   [facility]    Date of Initial Evaluation:  17  Visits:  Rxs Used: 5  Reason for Referral:  Neck pain    PROGRESS  REPORT  Progress reporting period is from 17 to 17.     SUBJECTIVE  Subjective: pt reports he has been feeling better since getting an injection in L neck however admits some R sided neck pain and muscle stiffness.   Current Pain level: 0/10   Initial Pain level: 6/10   Changes in function: Yes, see goal flow sheet for change in function   Adverse reactions: None;   ,     The objective findings are from DOS 17.    OBJECTIVE  Objective:  strength R: 113, 116, 108 L: 86,85,90degs       ASSESSMENT/PLAN  Updated problem list and treatment plan: Diagnosis 1:  Neck pain  STG/LTGs have been met or progress has been made towards goals:  Yes, headaches have been reduced to 3x/wk.  Assessment of Progress: The patient's condition is improving.  The patient's condition has potential to improve.  Self Management Plans:  Patient is independent in a home treatment program.  I have re-evaluated this patient and find that the nature, scope, duration and intensity of the therapy is appropriate for the medical condition of the patient.  Javi continues to require the following intervention to meet STG and LTG's: PT and HEP    Recommendations:  Javi has been seen clinically 5times for PT with improvement in cervical ROM and BUE strength which has correlated with reduced headaches, however he continues to have some R sided neck discomfort and would benefit from continued PT for posture awareness, neck ROM and posterior shoulder strengthening.    Thank you for your referral.    INQUIRIES  Therapist: Neo Dee DPT PHYSICAL THERAPY   [address] [place]  [address]  Phone: [phone]  Fax: [phone]

## 2017-11-24 NOTE — MR AVS SNAPSHOT
After Visit Summary   11/24/2017    Javi Salmeron    MRN: [id number]           Patient Information     Date Of Birth          1970        Visit Information        Provider Department      11/24/2017 4:50 PM Neo Dee, [facility]        Today's Diagnoses     Neck pain           Follow-ups after your visit        Your next 10 appointments already scheduled     Dec 04, 2017  4:30 PM CST   GROUP with [facility] CSB AC 2   [place] [facility] ([facility])    [address]  [address]  [facility] [address]   [phone]            Dec 15, 2017  4:50 PM CST   [facility] Sp[facility] ([facility])    [address]  [place] [address] [phone]            Jan 01, 2018  4:30 PM CST   GROUP with [facility] CSB AC 2   [place] [facility] ([facility])    [address]  [address]  [facility] [address]   [phone]            Fernie 10, 2018  4:00 PM CST   INDIVIDUAL THERAPY with Thomas Baugh, PhD    [facility] ([facility])    [address]  [address]  [facility] [address]   [phone]            Feb 05, 2018  4:30 PM CST   GROUP with [facility] CSB AC 2   [place] [facility] ([facility])    [address]  [address]  [facility] [address]   [phone]              Who to contact     If you have questions or need follow up information about today's clinic visit or your schedule please contact [facility] directly at [phone].  Normal or non-critical lab and imaging results will be communicated to you by MyChart, letter or phone within 4 business days after the clinic has received the results. If you do not hear from us within 7 days, please contact the clinic through MyChart or phone. If you have a critical or abnormal lab result, we will notify you by phone as soon as possible.  Submit refill  "requests through CompareMyFare or call your pharmacy and they will forward the refill request to us. Please allow 3 business days for your refill to be completed.          Additional Information About Your Visit        CompareMyFare Information     CompareMyFare lets you send messages to your doctor, view your test results, renew your prescriptions, schedule appointments and more. To sign up, go to [email or web address] . Click on \"Log in\" on the left side of the screen, which will take you to the Welcome page. Then click on \"Sign up Now\" on the right side of the page.     You will be asked to enter the access code listed below, as well as some personal information. Please follow the directions to create your username and password.     Your access code is: [email or web address]  Expires: 2017  2:44 PM     Your access code will  in 90 days. If you need help or a new code, please call your [place] clinic or [phone].        Care EveryWhere ID     This is your Care EveryWhere ID. This could be used by other organizations to access your [place] medical records  [id number]         Blood Pressure from Last 3 Encounters:   10/17/17 (!) 144/97   16 134/88    Weight from Last 3 Encounters:   10/27/17 90 kg (198 lb 8 oz)   10/17/17 92.1 kg (203 lb)   16 90.7 kg (200 lb)              We Performed the Following     [facility] PROGRESS NOTES REPORT     MANUAL THER TECH,1+REGIONS,EA 15 MIN     THERAPEUTIC EXERCISES        Primary Care Provider Office Phone # Fax #    Samy Rendon [phone]       [facility] [address]  [facility] [address]        Equal Access to Services     HELENA PALMER : Hadii mont[place] Rodriguez, waaxda luqadaha, qaybta kaalmada ademaxyada, ying lainez. So [facility] [phone].    ATENCIÓN: Si habla español, tiene a horton disposición servicios gratuitos de asistencia lingüística. Llame al [phone].    We comply with applicable federal civil rights " laws and [place] laws. We do not discriminate on the basis of race, color, national origin, age, disability, sex, sexual orientation, or gender identity.            Thank you!     Thank you for choosing [facility]  for your care. Our goal is always to provide you with excellent care. Hearing back from our patients is one way we can continue to improve our services. Please take a few minutes to complete the written survey that you may receive in the mail after your visit with us. Thank you!             Your Updated Medication List - Protect others around you: Learn how to safely use, store and throw away your medicines at www.disposemymeds.org.          This list is accurate as of: 11/24/17  5:44 PM.  Always use your most recent med list.                   Brand Name Dispense Instructions for use Diagnosis    Cervical Traction Kit     1 kit    Use at home as needed and guided by Physical therapy    Cervicalgia       citalopram 40 MG tablet    celeXA    30 tablet    Take 1 tablet (40 mg) by mouth every morning    Depression       naltrexone 50 MG tablet    DEPADE;REVIA    90 tablet    Take 1 tablet (50 mg) by mouth every morning    Unspecified psychosexual disorder

## 2017-11-24 NOTE — PROGRESS NOTES
Subjective:    HPI                    Objective:    System    Physical Exam    General     ROS    Assessment/Plan:      PROGRESS  REPORT    Progress reporting period is from 9/18/17 to 11/24/17.     SUBJECTIVE  Subjective: pt reports he has been feeling better since getting an injection in L neck however admits some R sided neck pain and muscle stiffness.   Current Pain level: 0/10   Initial Pain level: 6/10   Changes in function: Yes, see goal flow sheet for change in function   Adverse reactions: None;   ,     The objective findings are from DOS 11/24/17.    OBJECTIVE  Objective:  strength R: 113, 116, 108 L: 86,85,90degs       ASSESSMENT/PLAN  Updated problem list and treatment plan: Diagnosis 1:  Neck pain  STG/LTGs have been met or progress has been made towards goals:  Yes, headaches have been reduced to 3x/wk.  Assessment of Progress: The patient's condition is improving.  The patient's condition has potential to improve.  Self Management Plans:  Patient is independent in a home treatment program.  I have re-evaluated this patient and find that the nature, scope, duration and intensity of the therapy is appropriate for the medical condition of the patient.  Javi continues to require the following intervention to meet STG and LTG's: PT and HEP      Recommendations:  Javi has been seen clinically 5times for PT with improvement in cervical ROM and BUE strength which has correlated with reduced headaches, however he continues to have some R sided neck discomfort and would benefit from continued PT for posture awareness, neck ROM and posterior shoulder strengthening.    Please refer to the daily flowsheet for treatment today, total treatment time and time spent performing 1:1 timed codes.

## 2018-01-08 ENCOUNTER — OFFICE VISIT (OUTPATIENT)
Dept: OTHER | Facility: OUTPATIENT CENTER | Age: 48
End: 2018-01-08

## 2018-01-08 DIAGNOSIS — Z51.89 AFTERCARE: Primary | ICD-10-CM

## 2018-01-08 NOTE — MR AVS SNAPSHOT
After Visit Summary   1/8/2018    Javi Salmeron    MRN: [id number]           Patient Information     Date Of Birth          1970        Visit Information        Provider Department      1/8/2018 4:30 PM SE [facility] CS[facility] AC 2 [facility]        Today's Diagnoses     Aftercare    -  1       Follow-ups after your visit        Your next 10 appointments already scheduled     Fernie 10, 2018  4:00 PM CST   INDIVIDUAL THERAPY with Thomas Baugh, PhD [place]   [facility] ([facility])    [address]  [address]  [facility] [address]   [phone]            Feb 05, 2018  4:30 PM CST   GROUP with [facility] CSB AC 2   [facility] ([facility])    [address]  [address]  [facility] [address]   [phone]            Feb 14, 2018  4:00 PM CST   INDIVIDUAL THERAPY with Thomas Baugh, PhD [place]   [place] [facility] ([facility])    [address]  Mail [address] 75[address]  [facility] [address]   [phone]            Mar 14, 2018  4:00 PM CDT   INDIVIDUAL THERAPY with Thomas Baugh, PhD    [facility] ([facility])    [address]  [address]  [facility] [address]   [phone]              Who to contact     Please call your clinic at [phone] to:    Ask questions about your health    Make or cancel appointments    Discuss your medicines    Learn about your test results    Speak to your doctor   If you have compliments or concerns about an experience at your clinic, or if you wish to file a complaint, please contact [facility] Patient Relations at [phone] or email us at [email or web address]         Additional Information About Your Visit        brick&mobilehart Information     NaPopravku is an electronic gateway that provides easy, online access to your medical records. With NaPopravku, you can request a clinic appointment, read your  test results, renew a prescription or communicate with your care team.     To sign up for Apperianhart visit the website at [email or web address]   You will be asked to enter the access code listed below, as well as some personal information. Please follow the directions to create your username and password.     Your access code is: [email or web address]  Expires: 2018  6:20 PM     Your access code will  in 90 days. If you need help or a new code, please contact your [facility] or call [phone] for assistance.        Care EveryWhere ID     This is your Care EveryWhere ID. This could be used by other organizations to access your [place] medical records  [id number]         Blood Pressure from Last 3 Encounters:   10/17/17 (!) 144/97   16 134/88    Weight from Last 3 Encounters:   10/27/17 90 kg (198 lb 8 oz)   10/17/17 92.1 kg (203 lb)   16 90.7 kg (200 lb)              We Performed the Following     Support Group - 120 Min. [25461.936]     Support Group - 120 Min. [11842.831]        Primary Care Provider Office Phone # Fax #    Samy PELAYO Justice [phone]       [facility] [address]  [facility] [address]        Equal Access to Services     ANSHUL PALMER AH: Hadii [place] ku hadasho Soomaali, waaxda luqadaha, qaybta kaalmada jose, ying lainez. So [facility] [phone].    ATENCIÓN: Si habla español, tiene a horton disposición servicios gratuitos de asistencia lingüística. Tommy al [phone].    We comply with applicable federal civil rights laws and [place] laws. We do not discriminate on the basis of race, color, national origin, age, disability, sex, sexual orientation, or gender identity.            Thank you!     Thank you for choosing [facility] [facility]  for your care. Our goal is always to provide you with excellent care. Hearing back from our patients is one way we can continue to improve our services.  Please take a few minutes to complete the written survey that you may receive in the mail after your visit with us. Thank you!             Your Updated Medication List - Protect others around you: Learn how to safely use, store and throw away your medicines at www.disposemymeds.org.          This list is accurate as of: 1/8/18  6:20 PM.  Always use your most recent med list.                   Brand Name Dispense Instructions for use Diagnosis    Cervical Traction Kit     1 kit    Use at home as needed and guided by Physical therapy    Cervicalgia       citalopram 40 MG tablet    celeXA    30 tablet    Take 1 tablet (40 mg) by mouth every morning    Depression       naltrexone 50 MG tablet    DEPADE;REVIA    90 tablet    Take 1 tablet (50 mg) by mouth every morning    Unspecified psychosexual disorder

## 2018-01-09 PROBLEM — M54.2 NECK PAIN: Status: RESOLVED | Noted: 2017-09-18 | Resolved: 2018-01-09

## 2018-01-09 NOTE — PROGRESS NOTES
[place] [facility]  Case Progress Note    1/08/18    Client Name: Javi Salmeron  YOB: 1970  MRN:  [id number]  Treating Provider: Connie Baugh, PhD  Type of Session: Aftercare Group  Number of Minutes: 120    Current Symptoms/Status:  Had a good month despite significant problems with health. Staying within boundaries.    Progress Toward Treatment Goals:   Using support system.      Intervention: Modality and Description:  Aftercare support group.    Response to Intervention:  Holidays went well.  Feeling bad about kids - tapping into his core fuels.  Still not ready to get .  Does not feel quite settled.  Work going well.      Assignment:  Stay within boundaries.  Read Mervat's book.    Diagnosis:  N/A    Plan / Need for Future Services:  Return monthly individual therapy.  Return for aftercare support group.

## 2018-01-10 ENCOUNTER — OFFICE VISIT (OUTPATIENT)
Dept: OTHER | Facility: OUTPATIENT CENTER | Age: 48
End: 2018-01-10
Payer: COMMERCIAL

## 2018-01-10 DIAGNOSIS — F34.1 DYSTHYMIC DISORDER: ICD-10-CM

## 2018-01-10 DIAGNOSIS — F91.8 CONDUCT DISORDER, UNDIFFERENTIATED TYPE: Primary | ICD-10-CM

## 2018-01-10 NOTE — MR AVS SNAPSHOT
After Visit Summary   1/10/2018    Javi Salmeron    MRN: [id number]           Patient Information     Date Of Birth          1970        Visit Information        Provider Department      1/10/2018 4:00 PM Thomas Baugh, PhD [facility]        Today's Diagnoses     Conduct disorder, undifferentiated type    -  1    Dysthymic disorder           Follow-ups after your visit        Your next 10 appointments already scheduled     Feb 05, 2018  4:30 PM CST   GROUP with SE UMP CSB AC 2   [facility] ([facility])    [address]  Mail [address] 7521  [facility] [address]   [phone]            Feb 14, 2018  4:00 PM CST   INDIVIDUAL THERAPY with Thomas Baugh, PhD [facility]   [facility] ([facility])    [address]  Mail [address] 7521  [facility] [address]   [phone]            Mar 05, 2018  4:30 PM CST   GROUP with SE UMP CSB AC 2   [facility] ([facility])    [address]  [address]  [facility] [address]   [phone]            Mar 14, 2018  4:00 PM CDT   INDIVIDUAL THERAPY with Thomas Baugh, PhD [facility]   [facility] ([facility])    [address]  [address]  [facility] [address]   [phone]            Apr 02, 2018  4:30 PM CDT   GROUP with SE UMP CSB AC 2   [facility] ([facility])    [address]  [address]  [facility] [address]   [phone]            May 07, 2018  4:30 PM CDT   GROUP with SE UMP CSB AC 2   [facility] ([facility])    [address]  [address]  [facility] [address]   [phone]            Jun 04, 2018  4:30 PM CDT   GROUP with SE UMP CSB AC 2   [facility] ([facility])    [address]  [address]  [facility] [address]   [phone]              Who to contact     Please call your clinic at  [phone] to:    Ask questions about your health    Make or cancel appointments    Discuss your medicines    Learn about your test results    Speak to your doctor   If you have compliments or concerns about an experience at your clinic, or if you wish to file a complaint, please contact [facility] Patient Relations at [phone] or email us at [email or web address]         Additional Information About Your Visit        [employer] Information     [employer] is an electronic gateway that provides easy, online access to your medical records. With [employer], you can request a clinic appointment, read your test results, renew a prescription or communicate with your care team.     To sign up for [employer] visit the website at [email or web address]   You will be asked to enter the access code listed below, as well as some personal information. Please follow the directions to create your username and password.     Your access code is: [email or web address]  Expires: 2018  6:20 PM     Your access code will  in 90 days. If you need help or a new code, please contact your [facility] or call [phone] for assistance.        Care EveryWhere ID     This is your Care EveryWhere ID. This could be used by other organizations to access your [place] medical records  [id number]         Blood Pressure from Last 3 Encounters:   10/17/17 (!) 144/97   16 134/88    Weight from Last 3 Encounters:   10/27/17 90 kg (198 lb 8 oz)   10/17/17 92.1 kg (203 lb)   16 90.7 kg (200 lb)              We Performed the Following     Individual Psychotherapy (38-52 min) [14432]        Primary Care Provider Office Phone # Fax #    Samy Rendon [phone]       [facility] [address] [facility] [address]        Equal Access to Services     ANSHUL PALMER AH: Alexis Rodriguez, tom stroud, ying persaud  tucker smithjosechristiano rubioAlicebri ah. So [facility] [phone].    ATENCIÓN: Si habla chantell, tiene a horton disposición servicios gratuitos de asistencia lingüística. Tommy al [phone].    We comply with applicable federal civil rights laws and [place] laws. We do not discriminate on the basis of race, color, national origin, age, disability, sex, sexual orientation, or gender identity.            Thank you!     Thank you for choosing [facility]  for your care. Our goal is always to provide you with excellent care. Hearing back from our patients is one way we can continue to improve our services. Please take a few minutes to complete the written survey that you may receive in the mail after your visit with us. Thank you!             Your Updated Medication List - Protect others around you: Learn how to safely use, store and throw away your medicines at www.disposemymeds.org.          This list is accurate as of: 1/10/18 11:59 PM.  Always use your most recent med list.                   Brand Name Dispense Instructions for use Diagnosis    Cervical Traction Kit     1 kit    Use at home as needed and guided by Physical therapy    Cervicalgia       citalopram 40 MG tablet    celeXA    30 tablet    Take 1 tablet (40 mg) by mouth every morning    Depression       naltrexone 50 MG tablet    DEPADE;REVIA    90 tablet    Take 1 tablet (50 mg) by mouth every morning    Unspecified psychosexual disorder

## 2018-01-17 NOTE — PROGRESS NOTES
[facility]  Case Progress Note    1/10/18    Client Name: Javi Salmeron  YOB: 1970  MRN:  [id number]  Treating Provider: Connie Baugh, PhD  Type of Session: Individual  Number of Minutes: 45    Current Symptoms/Status:  Had a better month.  Continues to have difficulty with son - triggering him. Risk situation  Having more difficulty staying within boundaries.    Progress Toward Treatment Goals:   Using support system.   Less stressed  about son who has been acting out.    Intervention: Modality and Description:  CBT, interpersonal    Response to Intervention:Doing much better with son.  Son is still a challenge - and is triggering his CSB.  Talked about need to tighten his boundaries.  Health problems are much resolved.  Encouraged him to address issues with son in therapy - does not seem to be takign responsibility for his behavior.     Assignment:  Stay within boundaries.  Read Mervat's book.    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  Return for aftercare support group.

## 2018-02-05 ENCOUNTER — OFFICE VISIT (OUTPATIENT)
Dept: OTHER | Facility: OUTPATIENT CENTER | Age: 48
End: 2018-02-05

## 2018-02-05 DIAGNOSIS — Z51.89 AFTERCARE: Primary | ICD-10-CM

## 2018-02-05 NOTE — MR AVS SNAPSHOT
After Visit Summary   2/5/2018    Javi Salmeron    MRN: [id number]           Patient Information     Date Of Birth          1970        Visit Information        Provider Department      2/5/2018 4:30 PM SE UMP CSB AC 2 [facility]        Today's Diagnoses     Aftercare    -  1       Follow-ups after your visit        Your next 10 appointments already scheduled     Feb 14, 2018  4:00 PM CST   INDIVIDUAL THERAPY with Thomas Baugh, PhD [facility]   [facility] ([facility])    [address]  Mail [address] 75[address]  [facility] [address]   [phone]            Mar 05, 2018  4:30 PM CST   GROUP with SE UMP CSB AC 2   [facility] ([facility])    [address]  [address]  [facility] [address]   [phone]            Mar 14, 2018  4:00 PM CDT   INDIVIDUAL THERAPY with Thomas Baugh, PhD    [facility] ([facility])    [address]  [address]  [facility] [address]   [phone]            Apr 02, 2018  4:30 PM CDT   GROUP with SE UMP CSB AC 2   [facility] ([facility])    [address]  [address]  [facility] [address]   [phone]            Apr 17, 2018  4:00 PM CDT   INDIVIDUAL THERAPY with Thomas Baugh, PhD    [facility] ([facility])    [address]  [address]  [facility] [address]   [phone]            May 07, 2018  4:30 PM CDT   GROUP with SE UMP CSB AC 2   [facility] ([facility])    [address]  [address]  [facility] [address]   [phone]            Jun 04, 2018  4:30 PM CDT   GROUP with SE UMP CSB AC 2   [facility] ([facility])    [address]  [address]  [facility] [address]   [phone]              Who to contact     Please call your clinic at [phone] to:    Ask questions about your  health    Make or cancel appointments    Discuss your medicines    Learn about your test results    Speak to your doctor   If you have compliments or concerns about an experience at your clinic, or if you wish to file a complaint, please contact [facility] Patient Relations at [phone] or email us at [email or web address]         Additional Information About Your Visit        [employer] Information     [facility] is an electronic gateway that provides easy, online access to your medical records. With [employer], you can request a clinic appointment, read your test results, renew a prescription or communicate with your care team.     To sign up for [employer] visit the website at [email or web address]   You will be asked to enter the access code listed below, as well as some personal information. Please follow the directions to create your username and password.     Your access code is: [email or web address]  Expires: 2018  6:20 PM     Your access code will  in 90 days. If you need help or a new code, please contact your [facility] or call [phone] for assistance.        Care EveryWhere ID     This is your Care EveryWhere ID. This could be used by other organizations to access your [place] medical records  [id number]         Blood Pressure from Last 3 Encounters:   10/17/17 (!) 144/97   16 134/88    Weight from Last 3 Encounters:   10/27/17 90 kg (198 lb 8 oz)   10/17/17 92.1 kg (203 lb)   16 90.7 kg (200 lb)              We Performed the Following     Support Group - 120 Min. [95095.539]        Primary Care Provider Office Phone # Fax #    Samy Rendon [phone]       [facility] [address]  [facility] [address]        Equal Access to Services     ANSHUL PALMER : Alexis connollyo Soomaali, waaxda luqadaha, qaybta kaalmada adeegyada, ying lainez. So [facility]  [phone].    ATENCIÓN: Si molly abdul, tiene a horton disposición servicios gratuitos de asistencia lingüística. Tommy samson [phone].    We comply with applicable federal civil rights laws and [place] laws. We do not discriminate on the basis of race, color, national origin, age, disability, sex, sexual orientation, or gender identity.            Thank you!     Thank you for choosing [facility]  for your care. Our goal is always to provide you with excellent care. Hearing back from our patients is one way we can continue to improve our services. Please take a few minutes to complete the written survey that you may receive in the mail after your visit with us. Thank you!             Your Updated Medication List - Protect others around you: Learn how to safely use, store and throw away your medicines at www.disposemymeds.org.          This list is accurate as of 2/5/18  6:24 PM.  Always use your most recent med list.                   Brand Name Dispense Instructions for use Diagnosis    Cervical Traction Kit     1 kit    Use at home as needed and guided by Physical therapy    Cervicalgia       citalopram 40 MG tablet    celeXA    30 tablet    Take 1 tablet (40 mg) by mouth every morning    Depression       naltrexone 50 MG tablet    DEPADE;REVIA    90 tablet    Take 1 tablet (50 mg) by mouth every morning    Unspecified psychosexual disorder

## 2018-02-06 NOTE — PROGRESS NOTES
[facility]  Case Progress Note    2/05/18    Client Name: Javi Salmeron  YOB: 1970  MRN:  [id number]  Treating Provider: Connie Baugh, PhD  Type of Session: Aftercare Group  Number of Minutes: 120    Current Symptoms/Status:  Had a good month despite problems with health. Staying within boundaries.    Progress Toward Treatment Goals:   Using support system.      Intervention: Modality and Description:  Aftercare support group.    Response to Intervention:    Stress with son - still dealing with this.  Son being disruptive at work.  Lies.  Processed with group.  Received helpful feedback.    Assignment:  Stay within boundaries.  Read Mervat's book.    Diagnosis:  N/A    Plan / Need for Future Services:  Return monthly individual therapy.  Return for aftercare support group.

## 2018-02-14 ENCOUNTER — OFFICE VISIT (OUTPATIENT)
Dept: OTHER | Facility: OUTPATIENT CENTER | Age: 48
End: 2018-02-14
Payer: COMMERCIAL

## 2018-02-14 DIAGNOSIS — F91.8 CONDUCT DISORDER, UNDIFFERENTIATED TYPE: Primary | ICD-10-CM

## 2018-02-14 DIAGNOSIS — F34.1 DYSTHYMIC DISORDER: ICD-10-CM

## 2018-02-14 NOTE — MR AVS SNAPSHOT
After Visit Summary   2/14/2018    Javi Salmeron    MRN: [id number]           Patient Information     Date Of Birth          1970        Visit Information        Provider Department      2/14/2018 4:00 PM Thomas Baugh, PhD [facility]        Today's Diagnoses     Conduct disorder, undifferentiated type    -  1    Dysthymic disorder           Follow-ups after your visit        Your next 10 appointments already scheduled     Mar 05, 2018  4:30 PM CST   GROUP with SE UMP CSB AC 2   [facility] ([facility])    [address]  Mail Code 7521  [facility] [address]   [phone]            Mar 14, 2018  4:00 PM CDT   INDIVIDUAL THERAPY with Thomas Baugh, PhD [facility]   [facility] ([facility])    [address]  Mail [address] 75[address]  [facility] [address]   [phone]            Apr 02, 2018  4:30 PM CDT   GROUP with SE UMP CSB AC 2   [facility] ([facility])    [address]  [address]  [facility] [address]   [phone]            Apr 17, 2018  4:00 PM CDT   INDIVIDUAL THERAPY with Thomas Baugh, PhD [facility]   [facility] ([facility])    [address]  [address]  [facility] [address]   [phone]            May 07, 2018  4:30 PM CDT   GROUP with SE UMP CSB AC 2   [facility] ([facility])    [address]  Mail [address] 7521  [facility] [address]   [phone]            Jun 04, 2018  4:30 PM CDT   GROUP with SE UMP CSB AC 2   [facility] ([facility])    [address]  [address] 75[address]  [facility] [address]   [phone]              Who to contact     Please call your clinic at [phone] to:    Ask questions about your health    Make or cancel appointments    Discuss your medicines    Learn about your test results    Speak to your doctor            Additional Information About  Your Visit        [employer] Information     [employer] is an electronic gateway that provides easy, online access to your medical records. With [employer], you can request a clinic appointment, read your test results, renew a prescription or communicate with your care team.     To sign up for [employer] visit the website at [email or web address]   You will be asked to enter the access code listed below, as well as some personal information. Please follow the directions to create your username and password.     Your access code is: [email or web address]  Expires: 2018  6:20 PM     Your access code will  in 90 days. If you need help or a new code, please contact your [facility] or call [phone] for assistance.        Care EveryWhere ID     This is your Care EveryWhere ID. This could be used by other organizations to access your [place] medical records  [id number]         Blood Pressure from Last 3 Encounters:   10/17/17 (!) 144/97   16 134/88    Weight from Last 3 Encounters:   10/27/17 90 kg (198 lb 8 oz)   10/17/17 92.1 kg (203 lb)   16 90.7 kg (200 lb)              We Performed the Following     Individual Psychotherapy (53+ min) [76821]        Primary Care Provider Office Phone # Fax #    Samy Rendon [phone]       [facility] [address]  [facility] [address]        Equal Access to Services     ANSHUL PALMER : Hadii aad ku hadasho Soomaali, waaxda luqadaha, qaybta kaalmada adeegyada, ying carmona . So [facility] [phone].    ATENCIÓN: Si habla español, tiene a horton disposición servicios gratuitos de asistencia lingüística. Llgraciela al [phone].    We comply with applicable federal civil rights laws and [place] laws. We do not discriminate on the basis of race, color, national origin, age, disability, sex, sexual orientation, or gender identity.            Thank you!     Thank you for choosing [facility]  for your care. Our goal is always to provide you with excellent care. Hearing back from our patients is one way we can continue to improve our services. Please take a few minutes to complete the written survey that you may receive in the mail after your visit with us. Thank you!             Your Updated Medication List - Protect others around you: Learn how to safely use, store and throw away your medicines at www.disposemymeds.org.          This list is accurate as of 2/14/18 11:59 PM.  Always use your most recent med list.                   Brand Name Dispense Instructions for use Diagnosis    Cervical Traction Kit     1 kit    Use at home as needed and guided by Physical therapy    Cervicalgia       citalopram 40 MG tablet    celeXA    30 tablet    Take 1 tablet (40 mg) by mouth every morning    Depression       naltrexone 50 MG tablet    DEPADE;REVIA    90 tablet    Take 1 tablet (50 mg) by mouth every morning    Unspecified psychosexual disorder

## 2018-02-15 NOTE — PROGRESS NOTES
[facility]  Case Progress Note    2/14/18    Client Name: Javi Salmeron  YOB: 1970  MRN:  [id number]  Treating Provider: Connie Baugh, PhD  Type of Session: Individual  Number of Minutes: 55    Current Symptoms/Status:  Had a better month.  Continues to have difficulty with son - triggering him. Risk situation  Having more difficulty staying within boundaries.  But getting back within.    Progress Toward Treatment Goals:   Using support system.   Stressed  about son who has been acting out.    Intervention: Modality and Description:  CBT, interpersonal    Response to Intervention:  Doing much better with son.  Son is still a challenge - and is triggering his CSB.  Decision was made for him to have him stay with his mother.  This is a big relief.  Still having been triggered into his core negative fuels.  He needs to review his cycles  - and get back more into his positive cycle and core positive fuels.  Get release of informaiton and discuss with therapist.     Assignment:  Stay within boundaries.  Read Mervat's book.    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  Return for aftercare support group.

## 2018-03-14 ENCOUNTER — OFFICE VISIT (OUTPATIENT)
Dept: OTHER | Facility: OUTPATIENT CENTER | Age: 48
End: 2018-03-14
Payer: COMMERCIAL

## 2018-03-14 DIAGNOSIS — F91.8 CONDUCT DISORDER, UNDIFFERENTIATED TYPE: Primary | ICD-10-CM

## 2018-03-14 DIAGNOSIS — F34.1 DYSTHYMIC DISORDER: ICD-10-CM

## 2018-03-14 NOTE — MR AVS SNAPSHOT
After Visit Summary   3/14/2018    Javi Salmeron    MRN: [id number]           Patient Information     Date Of Birth          1970        Visit Information        Provider Department      3/14/2018 4:00 PM Thomas Baugh, PhD [facility]        Today's Diagnoses     Conduct disorder, undifferentiated type    -  1    Dysthymic disorder           Follow-ups after your visit        Your next 10 appointments already scheduled     Apr 02, 2018  4:30 PM CDT   GROUP with SE UMP CSB AC 2   [facility] ([facility])    [address]  Mail Code 7521  [facility] [address]   [phone]            Apr 17, 2018  4:00 PM CDT   Individual Therapy 53+ minutes with Thomas Baugh, PhD TYLER   [facility] ([facility])    [address]  [address]  [facility] [address]   [phone]            May 07, 2018  4:30 PM CDT   GROUP with SE P CSB AC 2   [place] [facility] ([facility])    [address]  Mail [address] 7521  [facility] [address]   [phone]            May 22, 2018  4:00 PM CDT   Individual Therapy 53+ minutes with Thomas Baugh, PhD TYLER   [facility] ([facility])    [address]  Mail [address] 7521  [facility] [address]   [phone]            Jun 04, 2018  4:30 PM CDT   GROUP with SE UMP CSB AC 2   [facility] [facility] ([facility])    [address]  Mail [address]  [facility] [address]   [phone]              Who to contact     Please call your clinic at [phone] to:    Ask questions about your health    Make or cancel appointments    Discuss your medicines    Learn about your test results    Speak to your doctor            Additional Information About Your Visit        Guest of a Guest Information     Guest of a Guest is an electronic gateway that provides easy, online access to your medical records. With Guest of a Guest, you can request a  clinic appointment, read your test results, renew a prescription or communicate with your care team.     To sign up for "[employer]hart visit the website at [email or web address]   You will be asked to enter the access code listed below, as well as some personal information. Please follow the directions to create your username and password.     Your access code is: [email or web address]  Expires: 2018  7:20 PM     Your access code will  in 90 days. If you need help or a new code, please contact your [facility] or call [phone] for assistance.        Care EveryWhere ID     This is your Care EveryWhere ID. This could be used by other organizations to access your [place] medical records  [id number]         Blood Pressure from Last 3 Encounters:   10/17/17 (!) 144/97   16 134/88    Weight from Last 3 Encounters:   10/27/17 90 kg (198 lb 8 oz)   10/17/17 92.1 kg (203 lb)   16 90.7 kg (200 lb)              We Performed the Following     Individual Psychotherapy (53+ min) [67253]        Primary Care Provider Office Phone # Fax #    Samy PIERCE Rendon [phone]       [facility] [address]  [facility] [address]        Equal Access to Services     ANSHUL PALMER : Hadii aad ku hadasho Soomaali, waaxda luqadaha, qaybta kaalmada adeegyada, waxay vicentein haybri carmona . So [facility] [phone].    ATENCIÓN: Si habla español, tiene a horton disposición servicios gratuitos de asistencia lingüística. Llgraciela al [phone].    We comply with applicable federal civil rights laws and [place] laws. We do not discriminate on the basis of race, color, national origin, age, disability, sex, sexual orientation, or gender identity.            Thank you!     Thank you for choosing [place] [facility]  for your care. Our goal is always to provide you with excellent care. Hearing back from our patients is one way we can continue to improve our services.  Please take a few minutes to complete the written survey that you may receive in the mail after your visit with us. Thank you!             Your Updated Medication List - Protect others around you: Learn how to safely use, store and throw away your medicines at www.disposemymeds.org.          This list is accurate as of 3/14/18 11:59 PM.  Always use your most recent med list.                   Brand Name Dispense Instructions for use Diagnosis    Cervical Traction Kit     1 kit    Use at home as needed and guided by Physical therapy    Cervicalgia       citalopram 40 MG tablet    celeXA    30 tablet    Take 1 tablet (40 mg) by mouth every morning    Depression       naltrexone 50 MG tablet    DEPADE;REVIA    90 tablet    Take 1 tablet (50 mg) by mouth every morning    Unspecified psychosexual disorder

## 2018-03-26 NOTE — PROGRESS NOTES
[place] [place] [facility]  Case Progress Note    3/14/18    Client Name: Javi Salmeron  YOB: 1970  MRN:  [id number]  Treating Provider: Connie Baugh, PhD  Type of Session: Individual  Number of Minutes: 55    Current Symptoms/Status:  Had a better month.  Continues to have difficulty with son - triggering him. Risk situation  Getting back within boundaries.     Progress Toward Treatment Goals:   Using support system.   Stressed  about son who has been acting out.    Intervention: Modality and Description:  CBT, interpersonal    Response to Intervention:  Came in with cycles - reviewed them in light of risk situation he is in.  Doing better staying within positive cycle.  Still doing some impression management with son.  Encouraged him to continue to talk with counselor.  I still don't understand why he is not doing family therapy.  Asked for release of information.  Still not ready for marriage but increasing honesty with partner has increased his desire.     Assignment:  Stay within boundaries.       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  Return for aftercare support group.

## 2018-04-02 ENCOUNTER — OFFICE VISIT (OUTPATIENT)
Dept: OTHER | Facility: OUTPATIENT CENTER | Age: 48
End: 2018-04-02

## 2018-04-02 DIAGNOSIS — Z51.89 ENCOUNTER FOR OTHER SPECIFIED AFTERCARE: Primary | ICD-10-CM

## 2018-04-02 NOTE — MR AVS SNAPSHOT
After Visit Summary   4/2/2018    Javi Salmeron    MRN: [id number]           Patient Information     Date Of Birth          1970        Visit Information        Provider Department      4/2/2018 4:30 PM SE UMP CSB AC 2 [facility]        Today's Diagnoses     Encounter for other specified aftercare    -  1       Follow-ups after your visit        Your next 10 appointments already scheduled     Apr 17, 2018  4:00 PM CDT   Individual Therapy 53+ minutes with Thomas Baugh, PhD [facility]ER   [facility] ([facility])    [address]  Mail [address] 7521  [facility] [address]   [phone]            May 07, 2018  4:30 PM CDT   GROUP with SE UMP CSB AC 2   [facility] ([facility])    [address]  Mail [address]  [facility] [address]   [phone]            May 22, 2018  4:00 PM CDT   Individual Therapy 53+ minutes with Thomas Baugh, PhD TYLER   [place] [facility] ([facility])    [address]  Mail [address] 75[address]  [facility] [address]   [phone]            Jun 04, 2018  4:30 PM CDT   GROUP with SE UMP CSB AC 2   [facility] ([facility])    [address]  Mail [address] 75[address]  [facility] [address]   [phone]            Jun 20, 2018  4:00 PM CDT   Individual Therapy 53+ minutes with Thomas Baugh, PhD TYLER   [facility] ([facility])    [address]  [address]  [facility] [address]   [phone]              Who to contact     Please call your clinic at [phone] to:    Ask questions about your health    Make or cancel appointments    Discuss your medicines    Learn about your test results    Speak to your doctor            Additional Information About Your Visit        [employer] Marketing Information     [employer] is an electronic gateway that provides easy, online access to your medical records. With [employer], you can request a clinic  appointment, read your test results, renew a prescription or communicate with your care team.     To sign up for [employer] studioshart visit the website at [email or web address]   You will be asked to enter the access code listed below, as well as some personal information. Please follow the directions to create your username and password.     Your access code is: [email or web address]  Expires: 2018  7:20 PM     Your access code will  in 90 days. If you need help or a new code, please contact your [facility] or call [phone] for assistance.        Care EveryWhere ID     This is your Care EveryWhere ID. This could be used by other organizations to access your [place] medical records  [id number]         Blood Pressure from Last 3 Encounters:   10/17/17 (!) 144/97   16 134/88    Weight from Last 3 Encounters:   10/27/17 90 kg (198 lb 8 oz)   10/17/17 92.1 kg (203 lb)   16 90.7 kg (200 lb)              We Performed the Following     Support Group 120 Min. ([phone])        Primary Care Provider Office Phone # Fax #    Samy PIERCE Rendon [phone]       [facility] [address] [facility] [address]        Equal Access to Services     ANSHUL PALMER : Hadii aad ku hadasho Soomaali, waaxda luqadaha, qaybta kaalmada adeegyada, waxay idiin haysaidan nghia carmona . So [facility] [phone].    ATENCIÓN: Si habla español, tiene a horton disposición servicios gratuitos de asistencia lingüística. Llgraciela al [phone].    We comply with applicable federal civil rights laws and [place] laws. We do not discriminate on the basis of race, color, national origin, age, disability, sex, sexual orientation, or gender identity.            Thank you!     Thank you for choosing [place] [facility]  for your care. Our goal is always to provide you with excellent care. Hearing back from our patients is one way we can continue to improve our services. Please take a few  minutes to complete the written survey that you may receive in the mail after your visit with us. Thank you!             Your Updated Medication List - Protect others around you: Learn how to safely use, store and throw away your medicines at www.disposemymeds.org.          This list is accurate as of 4/2/18 11:59 PM.  Always use your most recent med list.                   Brand Name Dispense Instructions for use Diagnosis    Cervical Traction Kit     1 kit    Use at home as needed and guided by Physical therapy    Cervicalgia       citalopram 40 MG tablet    celeXA    30 tablet    Take 1 tablet (40 mg) by mouth every morning    Depression       naltrexone 50 MG tablet    DEPADE;REVIA    90 tablet    Take 1 tablet (50 mg) by mouth every morning    Unspecified psychosexual disorder

## 2018-04-03 NOTE — PROGRESS NOTES
[place] [facility] -  Case Progress Note      Client Name: Javi Salmeron  YOB: 1970  MRN:  [id number]  Treating Provider: SE DMITRY MOSES 2  Type of Session: group 11  Present in Session:   Number of Minutes:  120  Date of Service: 4/02/18      Client attended aftercare. He shared recent conflict with son regarding behavior. He gave his son an ultimatum about apologizing or having to live with his mother. His son moved in with his mother. Client shared talking with sister for support and receiving criticism. When he was trying to explain more to her she just kept turning it back on him. He shared when his mother returned from her trip she did the same thing. He shared his ex-wife does not want to get involved. Client is frustrated--now judging his own parenting. Received feedback and support from group members. Validated for client his mother is often critical of what he does. He felt supported by the group.    Deloris Ruth PsyD, LP

## 2018-04-17 ENCOUNTER — OFFICE VISIT (OUTPATIENT)
Dept: OTHER | Facility: OUTPATIENT CENTER | Age: 48
End: 2018-04-17
Payer: COMMERCIAL

## 2018-04-17 DIAGNOSIS — F91.8 CONDUCT DISORDER, UNDIFFERENTIATED TYPE: Primary | ICD-10-CM

## 2018-04-17 DIAGNOSIS — F34.1 DYSTHYMIC DISORDER: ICD-10-CM

## 2018-04-17 NOTE — MR AVS SNAPSHOT
After Visit Summary   4/17/2018    Javi Salmeron    MRN: [id number]           Patient Information     Date Of Birth          1970        Visit Information        Provider Department      4/17/2018 4:00 PM Thomas Baugh, PhD [facility]        Today's Diagnoses     Conduct disorder, undifferentiated type    -  1    Dysthymic disorder           Follow-ups after your visit        Your next 10 appointments already scheduled     May 07, 2018  4:30 PM CDT   GROUP with SE UMP CSB AC 2   [facility] ([facility])    [address]  Mail [address] 7521  [facility] [address]   [phone]            May 22, 2018  4:00 PM CDT   Individual Therapy 53+ minutes with Thomas Baugh, PhD TYLER   [facility] ([facility])    [address]  Mail [address]  [facility] [address]   [phone]            Jun 04, 2018  4:30 PM CDT   GROUP with SE UMP CSB AC 2   [facility] ([facility])    [address]  [address]  [facility] [address]   [phone]            Jun 20, 2018  4:00 PM CDT   Individual Therapy 53+ minutes with Thomas Baugh, PhD TYLER   [facility] ([facility])    [address]  [address]  [facility] [address]   [phone]            Jul 02, 2018  4:30 PM CDT   GROUP with SE UMP CSB AC 2   [facility] ([facility])    [address]  Mail [address]  [facility] [address]   [phone]            Aug 06, 2018  4:30 PM CDT   GROUP with SE UMP CSB AC 2   [facility] ([facility])    [address]  [address] 75[address]  [facility] [address]   [phone]            Sep 03, 2018  4:30 PM CDT   GROUP with SE UMP CSB AC 2   [facility] ([facility])    [address]  Mail [address] 7521  [facility] [address]   [phone]            Oct 01, 2018  4:30 PM CDT   GROUP  with SE [facility] CSB AC 2   [facility] ([facility])    [address]  Mail [address] 7521  [facility] [address]   [phone]              Who to contact     Please call your clinic at [phone] to:    Ask questions about your health    Make or cancel appointments    Discuss your medicines    Learn about your test results    Speak to your doctor            Additional Information About Your Visit        MyChart Information     DinnerTime is an electronic gateway that provides easy, online access to your medical records. With DinnerTime, you can request a clinic appointment, read your test results, renew a prescription or communicate with your care team.     To sign up for DinnerTime visit the website at [email or web address]   You will be asked to enter the access code listed below, as well as some personal information. Please follow the directions to create your username and password.     Your access code is: [email or web address]  Expires: 2018  1:03 PM     Your access code will  in 90 days. If you need help or a new code, please contact your [facility] or call [phone] for assistance.        Care EveryWhere ID     This is your Care EveryWhere ID. This could be used by other organizations to access your [place] medical records  [id number]         Blood Pressure from Last 3 Encounters:   10/17/17 (!) 144/97   16 134/88    Weight from Last 3 Encounters:   10/27/17 90 kg (198 lb 8 oz)   10/17/17 92.1 kg (203 lb)   16 90.7 kg (200 lb)              Today, you had the following     No orders found for display       Primary Care Provider Office Phone # Fax #    Samy Rendon [phone]       [facility] [place] [address]  [facility] [address]        Equal Access to Services     ANSHUL PALMER : Hadii [place] connollyo Sosundar, waaxda luqadaha, qaybta kaalmada adeegyada, ying lainez. So [facility]  [phone].    ATENCIÓN: Si molly abdul, tiene a horton disposición servicios gratuitos de asistencia lingüística. Tommy samson [phone].    We comply with applicable federal civil rights laws and [place] laws. We do not discriminate on the basis of race, color, national origin, age, disability, sex, sexual orientation, or gender identity.            Thank you!     Thank you for choosing [facility]  for your care. Our goal is always to provide you with excellent care. Hearing back from our patients is one way we can continue to improve our services. Please take a few minutes to complete the written survey that you may receive in the mail after your visit with us. Thank you!             Your Updated Medication List - Protect others around you: Learn how to safely use, store and throw away your medicines at www.disposemymeds.org.          This list is accurate as of 4/17/18 11:59 PM.  Always use your most recent med list.                   Brand Name Dispense Instructions for use Diagnosis    Cervical Traction Kit     1 kit    Use at home as needed and guided by Physical therapy    Cervicalgia       citalopram 40 MG tablet    celeXA    30 tablet    Take 1 tablet (40 mg) by mouth every morning    Depression       naltrexone 50 MG tablet    DEPADE;REVIA    90 tablet    Take 1 tablet (50 mg) by mouth every morning    Unspecified psychosexual disorder

## 2018-04-19 NOTE — PROGRESS NOTES
[place] [facility]  Case Progress Note    4/17/18    Client Name: Javi Salmeron  YOB: 1970  MRN:  [id number]  Treating Provider: Connie Baugh, PhD  Type of Session: Individual  Number of Minutes: 55    Current Symptoms/Status:  Still quite distressed about son - triggering him. Risk situation  Getting back within boundaries.     Progress Toward Treatment Goals:   Using support system.   Stressed  about son who has been acting out.    Intervention: Modality and Description:  CBT, interpersonal    Response to Intervention:  Came in with cycles - reviewed them in light of risk situation he is in.  Doing better staying within positive cycle.  Fears of son coming back home over the summer.  Have not been able to connect to his therapist.  Called therapist in presence of partner asking why family therapy has not been initiated.  Was able to be more authentic and honest with partner.  Has releieved some of his stress.  Talked about better coping mechanism.    Assignment:  Stay within boundaries.       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  Return for aftercare support group.

## 2018-05-07 ENCOUNTER — OFFICE VISIT (OUTPATIENT)
Dept: OTHER | Facility: OUTPATIENT CENTER | Age: 48
End: 2018-05-07

## 2018-05-07 DIAGNOSIS — Z51.89 AFTERCARE: Primary | ICD-10-CM

## 2018-05-07 NOTE — PROGRESS NOTES
[place] [facility]  Case Progress Note    5/07/18    Client Name: Javi Salmeron  YOB: 1970  MRN:  [id number]  Treating Provider: Conine Baugh, PhD  Type of Session: Aftercare  Number of Minutes: 120    Current Symptoms/Status:  Still quite distressed about finding ou about his real father  And having to confront mother.      Progress Toward Treatment Goals:   Using support system.   Stressed  about learning about who is real father is.    Intervention: Modality and Description:  Aftercare support group.    Response to Intervention:  Processed his feelings regarding father.  Wondering about contact him.  Received good feedback from group members.  Felt feedback was helpful.      Assignment:  Stay within boundaries.       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

## 2018-05-22 ENCOUNTER — OFFICE VISIT (OUTPATIENT)
Dept: OTHER | Facility: OUTPATIENT CENTER | Age: 48
End: 2018-05-22
Payer: COMMERCIAL

## 2018-05-22 DIAGNOSIS — F34.1 DYSTHYMIC DISORDER: ICD-10-CM

## 2018-05-22 DIAGNOSIS — F91.8 CONDUCT DISORDER, UNDIFFERENTIATED TYPE: Primary | ICD-10-CM

## 2018-05-22 NOTE — MR AVS SNAPSHOT
After Visit Summary   5/22/2018    Javi Salmeron    MRN: [id number]           Patient Information     Date Of Birth          1970        Visit Information        Provider Department      5/22/2018 4:00 PM Thomas Baugh, PhD [facility]        Today's Diagnoses     Conduct disorder, undifferentiated type    -  1    Dysthymic disorder           Follow-ups after your visit        Your next 10 appointments already scheduled     Jun 04, 2018  4:30 PM CDT   GROUP with SE UMP CSB AC 2   [facility] ([facility])    [address]  Mail Code 7521  [facility] [address]   [phone]            Jun 20, 2018  4:00 PM CDT   Individual Therapy 53+ minutes with Thomas Baugh, PhD TYLER   [facility] ([facility])    [address]  Mail Code 7521  [facility] [address]   [phone]            Jul 02, 2018  4:30 PM CDT   GROUP with SE UMP CSB AC 2   [facility] ([facility])    [address]  Mail Code 7521  [facility] [address]   [phone]            Jul 17, 2018  4:00 PM CDT   Individual Therapy 53+ minutes with Thomas Baugh, PhD TYLER   [facility] ([facility])    [address]  Mail [address] 7521  [facility] [address]   [phone]            Aug 06, 2018  4:30 PM CDT   GROUP with SE UMP CSB AC 2   [facility] ([facility])    [address]  Mail [address] 7521  [facility] [address]   [phone]            Sep 03, 2018  4:30 PM CDT   GROUP with SE UMP CSB AC 2   [facility] ([facility])    [address]  Mail [address] 7521  [facility] [address]   [phone]            Oct 01, 2018  4:30 PM CDT   GROUP with SE UMP CSB AC 2   [facility] ([facility])    [address]  Mail [address] 7521  [facility] [address]   [phone]              Who to contact     Please call  your clinic at [phone] to:    Ask questions about your health    Make or cancel appointments    Discuss your medicines    Learn about your test results    Speak to your doctor            Additional Information About Your Visit        MyChart Information     [employer] is an electronic gateway that provides easy, online access to your medical records. With [employer], you can request a clinic appointment, read your test results, renew a prescription or communicate with your care team.     To sign up for [employer] visit the website at [email or web address]   You will be asked to enter the access code listed below, as well as some personal information. Please follow the directions to create your username and password.     Your access code is: [email or web address]  Expires: 2018  1:03 PM     Your access code will  in 90 days. If you need help or a new code, please contact your [facility] or call [phone] for assistance.        Care EveryWhere ID     This is your Care EveryWhere ID. This could be used by other organizations to access your [place] medical records  [id number]         Blood Pressure from Last 3 Encounters:   10/17/17 (!) 144/97   16 134/88    Weight from Last 3 Encounters:   10/27/17 90 kg (198 lb 8 oz)   10/17/17 92.1 kg (203 lb)   16 90.7 kg (200 lb)              We Performed the Following     Individual Psychotherapy (53+ min) [77009]        Primary Care Provider Office Phone # Fax #    Samy PIERCE Rendon [phone]       [facility] [address]  [facility] [address]        Equal Access to Services     [facility]: Hadii aad ku hadasho Soomaali, waaxda luqadaha, qaybta kaalmada adeegyada, ying lainez. So [facility] [phone].    ATENCIÓN: Si habla español, tiene a horton disposición servicios gratuitos de asistencia lingüística. Llame al [phone].    We comply with applicable federal civil rights  laws and [place] laws. We do not discriminate on the basis of race, color, national origin, age, disability, sex, sexual orientation, or gender identity.            Thank you!     Thank you for choosing [place] [facility]  for your care. Our goal is always to provide you with excellent care. Hearing back from our patients is one way we can continue to improve our services. Please take a few minutes to complete the written survey that you may receive in the mail after your visit with us. Thank you!             Your Updated Medication List - Protect others around you: Learn how to safely use, store and throw away your medicines at www.disposemymeds.org.          This list is accurate as of 5/22/18 11:59 PM.  Always use your most recent med list.                   Brand Name Dispense Instructions for use Diagnosis    Cervical Traction Kit     1 kit    Use at home as needed and guided by Physical therapy    Cervicalgia       citalopram 40 MG tablet    celeXA    30 tablet    Take 1 tablet (40 mg) by mouth every morning    Depression       naltrexone 50 MG tablet    DEPADE;REVIA    90 tablet    Take 1 tablet (50 mg) by mouth every morning    Unspecified psychosexual disorder

## 2018-05-22 NOTE — PROGRESS NOTES
[facility]  Case Progress Note    5/22/18    Client Name: Javi Salmeron  YOB: 1970  MRN:  [id number]  Treating Provider: Connie Baugh, PhD  Type of Session: Individual  Number of Minutes: 55    Current Symptoms/Status:  Still quite distressed about finding out about his real father  And having to confront mother.      Staying within boundaries despite risk situation.    Progress Toward Treatment Goals:   Using support system.   Stressed  about learning about who is real father is.    Intervention: Modality and Description:  Individual therapy, CBT, interpersonal    Response to Intervention:  Very stressful that mother continues to want to cover up the truth of his upbringing. Keeps confronting her.      He has been in contact with his biological father.  He feels mixed emotions.  But feels better knowing the truth.    Talked about possibility of bringing mother in for family therapy.    Relationship with sherin going better.    Assignment:  Stay within boundaries.       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  1X a month individual therapy.

## 2018-06-04 ENCOUNTER — OFFICE VISIT (OUTPATIENT)
Dept: OTHER | Facility: OUTPATIENT CENTER | Age: 48
End: 2018-06-04

## 2018-06-04 DIAGNOSIS — Z51.89 AFTERCARE: Primary | ICD-10-CM

## 2018-06-04 NOTE — MR AVS SNAPSHOT
After Visit Summary   6/4/2018    Javi Salmeron    MRN: [id number]           Patient Information     Date Of Birth          1970        Visit Information        Provider Department      6/4/2018 4:30 PM SE UMP CSB AC 2 [facility]        Today's Diagnoses     Aftercare    -  1       Follow-ups after your visit        Your next 10 appointments already scheduled     Jun 20, 2018  4:00 PM CDT   Individual Therapy 53+ minutes with Thomas Baugh, PhD TYLER   [facility] ([facility])    [address]  Mail Code 7521  [facility] [address]   [phone]            Jul 02, 2018  4:30 PM CDT   GROUP with SE UMP CSB AC 2   [facility] ([facility])    [address]  Mail [address] 7521  [facility] [address]   [phone]            Jul 17, 2018  4:00 PM CDT   Individual Therapy 53+ minutes with Thomas Baugh, PhD TYLER   [facility] ([facility])    [address]  Mail [address] 7521  [facility] [address]   [phone]            Aug 06, 2018  4:30 PM CDT   GROUP with SE UMP CSB AC 2   [facility] ([facility])    [address]  Mail [address] 75[address]  [facility] [address]   [phone]            Aug 15, 2018  4:00 PM CDT   Individual Therapy 53+ minutes with Thomas Baugh, PhD TYLER   [facility] ([facility])    [address]  Mail [address] 7521  [facility] [address]   [phone]            Sep 03, 2018  4:30 PM CDT   GROUP with SE UMP CSB AC 2   [facility] ([facility])    [address]  Mail [address] 7521  [facility] [address]   [phone]            Oct 01, 2018  4:30 PM CDT   GROUP with SE UMP CSB AC 2   [facility] ([facility])    [address]  Mail [address] 7521  [facility] [address]   [phone]              Who to contact     Please call your clinic at [phone]  to:    Ask questions about your health    Make or cancel appointments    Discuss your medicines    Learn about your test results    Speak to your doctor            Additional Information About Your Visit        MyChart Information     [employer] is an electronic gateway that provides easy, online access to your medical records. With [employer], you can request a clinic appointment, read your test results, renew a prescription or communicate with your care team.     To sign up for [employer] visit the website at [email or web address]   You will be asked to enter the access code listed below, as well as some personal information. Please follow the directions to create your username and password.     Your access code is: [email or web address]  Expires: 2018  1:03 PM     Your access code will  in 90 days. If you need help or a new code, please contact your [facility] or call [phone] for assistance.        Care EveryWhere ID     This is your Care EveryWhere ID. This could be used by other organizations to access your [place] medical records  [id number]         Blood Pressure from Last 3 Encounters:   10/17/17 (!) 144/97   16 134/88    Weight from Last 3 Encounters:   10/27/17 90 kg (198 lb 8 oz)   10/17/17 92.1 kg (203 lb)   16 90.7 kg (200 lb)              We Performed the Following     Support Group - 120 Min. [97798.539]        Primary Care Provider Office Phone # Fax #    Samy PIERCE Rendon [phone]       [facility] [address] [facility] [address]        Equal Access to Services     [facility]: Hadii aad ku hadasho Soomaali, waaxda luqadaha, qaybta kaalmada adeegyada, ying dockery haybri carmona . So [facility] [phone].    ATENCIÓN: Si habla español, tiene a horton disposición servicios gratuitos de asistencia lingüística. Llame al [phone].    We comply with applicable federal civil rights laws and [place] laws. We do not  discriminate on the basis of race, color, national origin, age, disability, sex, sexual orientation, or gender identity.            Thank you!     Thank you for choosing [place] [facility]  for your care. Our goal is always to provide you with excellent care. Hearing back from our patients is one way we can continue to improve our services. Please take a few minutes to complete the written survey that you may receive in the mail after your visit with us. Thank you!             Your Updated Medication List - Protect others around you: Learn how to safely use, store and throw away your medicines at www.disposemymeds.org.          This list is accurate as of 6/4/18  6:18 PM.  Always use your most recent med list.                   Brand Name Dispense Instructions for use Diagnosis    Cervical Traction Kit     1 kit    Use at home as needed and guided by Physical therapy    Cervicalgia       citalopram 40 MG tablet    celeXA    30 tablet    Take 1 tablet (40 mg) by mouth every morning    Depression       naltrexone 50 MG tablet    DEPADE;REVIA    90 tablet    Take 1 tablet (50 mg) by mouth every morning    Unspecified psychosexual disorder

## 2018-06-20 ENCOUNTER — OFFICE VISIT (OUTPATIENT)
Dept: OTHER | Facility: OUTPATIENT CENTER | Age: 48
End: 2018-06-20
Payer: COMMERCIAL

## 2018-06-20 DIAGNOSIS — F34.1 DYSTHYMIC DISORDER: ICD-10-CM

## 2018-06-20 DIAGNOSIS — F91.8 CONDUCT DISORDER, UNDIFFERENTIATED TYPE: Primary | ICD-10-CM

## 2018-06-20 NOTE — MR AVS SNAPSHOT
After Visit Summary   6/20/2018    Javi Salmeron    MRN: [id number]           Patient Information     Date Of Birth          1970        Visit Information        Provider Department      6/20/2018 4:00 PM Thomas Baugh, PhD [facility]        Today's Diagnoses     Conduct disorder, undifferentiated type    -  1    Dysthymic disorder           Follow-ups after your visit        Your next 10 appointments already scheduled     Jul 17, 2018  4:00 PM CDT   Individual Therapy 53+ minutes with Thomas Bauhg, PhD TYLER   [facility] ([facility])    [address]  Mail Code 7521  [facility] [address]   [phone]            Aug 06, 2018  4:30 PM CDT   GROUP with SE UMP CSB AC 2   [facility] ([facility])    [address]  Mail [address]  [facility] [address]   [phone]            Aug 15, 2018  4:00 PM CDT   Individual Therapy 53+ minutes with Thomas Baugh, PhD TYLER   [facility] ([facility])    [address]  Mail [address] 7521  [facility] [address]   [phone]            Sep 03, 2018  4:30 PM CDT   GROUP with SE UMP CSB AC 2   [facility] ([facility])    [address]  Mail [address] 75[address]  [facility] [address]   [phone]            Oct 01, 2018  4:30 PM CDT   GROUP with SE UMP CSB AC 2   [facility] ([facility])    [address]  Mail [address] 7521  [facility] [address]   [phone]              Who to contact     Please call your clinic at [phone] to:    Ask questions about your health    Make or cancel appointments    Discuss your medicines    Learn about your test results    Speak to your doctor            Additional Information About Your Visit        [employer] Information     [employer] is an electronic gateway that provides easy, online access to your medical records. With [employer], you can request a  clinic appointment, read your test results, renew a prescription or communicate with your care team.     To sign up for [employer] visit the website at [email or web address]   You will be asked to enter the access code listed below, as well as some personal information. Please follow the directions to create your username and password.     Your access code is: [email or web address]  Expires: 2018  1:03 PM     Your access code will  in 90 days. If you need help or a new code, please contact your [facility] or call [phone] for assistance.        Care EveryWhere ID     This is your Care EveryWhere ID. This could be used by other organizations to access your [place] medical records  [id number]         Blood Pressure from Last 3 Encounters:   10/17/17 (!) 144/97   16 134/88    Weight from Last 3 Encounters:   10/27/17 90 kg (198 lb 8 oz)   10/17/17 92.1 kg (203 lb)   16 90.7 kg (200 lb)              We Performed the Following     Individual Psychotherapy (53+ min) [26323]        Primary Care Provider Office Phone # Fax #    Samy PIERCE Rendon [phone]       [facility] [address]  [facility] [address]        Equal Access to Services     [facility] : Hadii aad ku hadasho Soomaali, waaxda luqadaha, qaybta kaalmada adeegyada, waxay nahed haybri carmona . So [facility] [phone].    ATENCIÓN: Si habla español, tiene a horton disposición servicios gratuitos de asistencia lingüística. Llame al [phone].    We comply with applicable federal civil rights laws and [place] laws. We do not discriminate on the basis of race, color, national origin, age, disability, sex, sexual orientation, or gender identity.            Thank you!     Thank you for choosing [place] [facility]  for your care. Our goal is always to provide you with excellent care. Hearing back from our patients is one way we can continue to improve our services.  Please take a few minutes to complete the written survey that you may receive in the mail after your visit with us. Thank you!             Your Updated Medication List - Protect others around you: Learn how to safely use, store and throw away your medicines at www.disposemymeds.org.          This list is accurate as of 6/20/18 11:59 PM.  Always use your most recent med list.                   Brand Name Dispense Instructions for use Diagnosis    Cervical Traction Kit     1 kit    Use at home as needed and guided by Physical therapy    Cervicalgia       citalopram 40 MG tablet    celeXA    30 tablet    Take 1 tablet (40 mg) by mouth every morning    Depression       naltrexone 50 MG tablet    DEPADE;REVIA    90 tablet    Take 1 tablet (50 mg) by mouth every morning    Unspecified psychosexual disorder

## 2018-07-02 ENCOUNTER — OFFICE VISIT (OUTPATIENT)
Dept: OTHER | Facility: OUTPATIENT CENTER | Age: 48
End: 2018-07-02

## 2018-07-02 DIAGNOSIS — Z51.89 AFTERCARE: Primary | ICD-10-CM

## 2018-07-02 NOTE — MR AVS SNAPSHOT
After Visit Summary   7/2/2018    Javi Salmeron    MRN: [id number]           Patient Information     Date Of Birth          1970        Visit Information        Provider Department      7/2/2018 4:30 PM SE UMP CSB AC 2 [facility]        Today's Diagnoses     Aftercare    -  1       Follow-ups after your visit        Your next 10 appointments already scheduled     Jul 17, 2018  4:00 PM CDT   Individual Therapy 53+ minutes with Thomas Baugh, PhD TYLER   [facility] ([facility])    [address]  Mail [address] 7521  [facility] [address]   [phone]            Aug 06, 2018  4:30 PM CDT   GROUP with SE UMP CSB AC 2   [facility] ([facility])    [address]  Mail [address] 75[address]  [facility] [address]   [phone]            Aug 15, 2018  4:00 PM CDT   Individual Therapy 53+ minutes with Thomas Baugh, PhD [facility] ([facility])    [address]  Mail [address] 7521  [facility] [address]   [phone]            Sep 03, 2018  4:30 PM CDT   GROUP with SE UMP CSB AC 2   [facility] ([facility])    [address]  Mail [address] 7521  [facility] [address]   [phone]            Oct 01, 2018  4:30 PM CDT   GROUP with SE UMP CSB AC 2   [facility] ([facility])    [address]  Mail [address] 7521  [facility] [address]   [phone]              Who to contact     Please call your clinic at [phone] to:    Ask questions about your health    Make or cancel appointments    Discuss your medicines    Learn about your test results    Speak to your doctor            Additional Information About Your Visit        [employer] Information     [employer] is an electronic gateway that provides easy, online access to your medical records. With [employer], you can request a clinic appointment, read your test results, renew a prescription or  communicate with your care team.     To sign up for [employer] visit the website at [email or web address]   You will be asked to enter the access code listed below, as well as some personal information. Please follow the directions to create your username and password.     Your access code is: [email or web address]  Expires: 2018  1:03 PM     Your access code will  in 90 days. If you need help or a new code, please contact your [facility] or call [phone] for assistance.        Care EveryWhere ID     This is your Care EveryWhere ID. This could be used by other organizations to access your [place] medical records  [id number]         Blood Pressure from Last 3 Encounters:   10/17/17 (!) 144/97   16 134/88    Weight from Last 3 Encounters:   10/27/17 90 kg (198 lb 8 oz)   10/17/17 92.1 kg (203 lb)   16 90.7 kg (200 lb)              We Performed the Following     Support Group - 120 Min. [28326.539]        Primary Care Provider Office Phone # Fax #    Samy PELAYO Justice [phone]       [facility] [address] [facility] [address]        Equal Access to Services     ANSHUL PALMER : Hadii aad ku hadasho Soomaali, waaxda luqadaha, qaybta kaalmada adeegyada, waxay idiin hayaan nghia carmona . So [facility] [phone].    ATENCIÓN: Si habla español, tiene a horton disposición servicios gratuitos de asistencia lingüística. [facility] [phone].    We comply with applicable federal civil rights laws and [place] laws. We do not discriminate on the basis of race, color, national origin, age, disability, sex, sexual orientation, or gender identity.            Thank you!     Thank you for choosing [place] [facility]  for your care. Our goal is always to provide you with excellent care. Hearing back from our patients is one way we can continue to improve our services. Please take a few minutes to complete the written survey that you may receive  in the mail after your visit with us. Thank you!             Your Updated Medication List - Protect others around you: Learn how to safely use, store and throw away your medicines at www.disposemymeds.org.          This list is accurate as of 7/2/18  6:14 PM.  Always use your most recent med list.                   Brand Name Dispense Instructions for use Diagnosis    Cervical Traction Kit     1 kit    Use at home as needed and guided by Physical therapy    Cervicalgia       citalopram 40 MG tablet    celeXA    30 tablet    Take 1 tablet (40 mg) by mouth every morning    Depression       naltrexone 50 MG tablet    DEPADE;REVIA    90 tablet    Take 1 tablet (50 mg) by mouth every morning    Unspecified psychosexual disorder

## 2018-07-02 NOTE — PROGRESS NOTES
[facility]  Case Progress Note    7/02/18    Client Name: Javi Salmeron  YOB: 1970  MRN:  [id number]  Treating Provider: Connie Baugh, PhD  Type of Session: Aftercare  Number of Minutes: 120    Current Symptoms/Status:  Still quite distressed about finding out about his real father  Mother not wanting to be honest.    Progress Toward Treatment Goals:   Using support system.   Stressed  about learning about who is real father is.    Intervention: Modality and Description:  Aftercare support group.    Response to Intervention:  Still very angry with mother for lying.  Feeling depressed as a result.  Letting it get to him.  Encouraged him to take control.        Assignment:  Stay within boundaries.       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

## 2018-07-02 NOTE — PROGRESS NOTES
[facility]  Case Progress Note    6/20/18    Client Name: Javi Salmeron  YOB: 1970  MRN:  [id number]  Treating Provider: Connie Baugh, PhD  Type of Session: Individual  Number of Minutes: 55    Current Symptoms/Status:  Still quite distressed about finding out about his real father  And having to confront mother.      Staying within boundaries despite risk situation.    Progress Toward Treatment Goals:   Using support system.   Stressed  about learning about who is real father is.    Intervention: Modality and Description:  Individual therapy, CBT, interpersonal    Response to Intervention:  Still very distressed about his mother lying.  Relationship with son going much better.    Challenged him about seeking ore from his biologically dad that is realistic.     Talked about possibility of bringing mother in for family therapy.    Assignment:  Stay within boundaries.       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  1X a month individual therapy.

## 2018-07-17 ENCOUNTER — OFFICE VISIT (OUTPATIENT)
Dept: OTHER | Facility: OUTPATIENT CENTER | Age: 48
End: 2018-07-17
Payer: COMMERCIAL

## 2018-07-17 DIAGNOSIS — F91.8 CONDUCT DISORDER, UNDIFFERENTIATED TYPE: Primary | ICD-10-CM

## 2018-07-17 DIAGNOSIS — F34.1 DYSTHYMIC DISORDER: ICD-10-CM

## 2018-07-17 NOTE — MR AVS SNAPSHOT
After Visit Summary   7/17/2018    Javi Salmeron    MRN: [id number]           Patient Information     Date Of Birth          1970        Visit Information        Provider Department      7/17/2018 4:00 PM Thomas Baugh, PhD [facility]        Today's Diagnoses     Conduct disorder, undifferentiated type    -  1    Dysthymic disorder           Follow-ups after your visit        Your next 10 appointments already scheduled     Aug 06, 2018  4:30 PM CDT   GROUP with SE UMP CSB AC 2   [facility] ([facility])    [address]  Mail [address] 7521  [facility] [address]   [phone]            Aug 15, 2018  4:00 PM CDT   Individual Therapy 53+ minutes with Thomas Baugh, PhD [facility]   [facility] ([facility])    [address]  [address]  [facility] [address]   [phone]            Sep 03, 2018  4:30 PM CDT   GROUP with SE P CSB AC 2   [facility] ([facility])    [address]  [address]  [facility] [address]   [phone]            Sep 25, 2018  4:00 PM CDT   Individual Therapy 53+ minutes with Thomas Baugh, PhD TYLER   [facility] [facility] ([facility])    [address]  [address]  [facility] [address]   [phone]            Oct 01, 2018  4:30 PM CDT   GROUP with SE UMP CSB AC 2   [place] [facility] ([facility])    [address]  [address]  [facility] [address]   [phone]              Who to contact     Please call your clinic at [phone] to:    Ask questions about your health    Make or cancel appointments    Discuss your medicines    Learn about your test results    Speak to your doctor            Additional Information About Your Visit        Nuevo Midstream Information     Nuevo Midstream is an electronic gateway that provides easy, online access to your medical records. With Nuevo Midstream, you can request a  clinic appointment, read your test results, renew a prescription or communicate with your care team.     To sign up for Zhilabshart visit the website at [email or web address]   You will be asked to enter the access code listed below, as well as some personal information. Please follow the directions to create your username and password.     Your access code is: [email or web address]  Expires: 10/20/2018  9:26 AM     Your access code will  in 90 days. If you need help or a new code, please contact your [facility] or call [phone] for assistance.        Care EveryWhere ID     This is your Care EveryWhere ID. This could be used by other organizations to access your [place] medical records  [id number]         Blood Pressure from Last 3 Encounters:   10/17/17 (!) 144/97   16 134/88    Weight from Last 3 Encounters:   10/27/17 90 kg (198 lb 8 oz)   10/17/17 92.1 kg (203 lb)   16 90.7 kg (200 lb)              We Performed the Following     Individual Psychotherapy (53+ min) [64694]        Primary Care Provider Office Phone # Fax #    Samy PIERCE Rendon [phone]       [facility] [address]  [facility] [address]        Equal Access to Services     ANSHUL PALMER : Hadii aad ku hadasho Soomaali, waaxda luqadaha, qaybta kaalmada adeegyada, waxay nahed haybri lainez. So [facility] [phone].    ATENCIÓN: Si habla español, tiene a horton disposición servicios gratuitos de asistencia lingüística. Llgraciela al [phone].    We comply with applicable federal civil rights laws and [place] laws. We do not discriminate on the basis of race, color, national origin, age, disability, sex, sexual orientation, or gender identity.            Thank you!     Thank you for choosing [facility]  for your care. Our goal is always to provide you with excellent care. Hearing back from our patients is one way we can continue to improve our services.  Please take a few minutes to complete the written survey that you may receive in the mail after your visit with us. Thank you!             Your Updated Medication List - Protect others around you: Learn how to safely use, store and throw away your medicines at www.disposemymeds.org.          This list is accurate as of 7/17/18 11:59 PM.  Always use your most recent med list.                   Brand Name Dispense Instructions for use Diagnosis    Cervical Traction Kit     1 kit    Use at home as needed and guided by Physical therapy    Cervicalgia       citalopram 40 MG tablet    celeXA    30 tablet    Take 1 tablet (40 mg) by mouth every morning    Depression       naltrexone 50 MG tablet    DEPADE;REVIA    90 tablet    Take 1 tablet (50 mg) by mouth every morning    Unspecified psychosexual disorder

## 2018-08-15 ENCOUNTER — OFFICE VISIT (OUTPATIENT)
Dept: OTHER | Facility: OUTPATIENT CENTER | Age: 48
End: 2018-08-15
Payer: COMMERCIAL

## 2018-08-15 DIAGNOSIS — F91.8 CONDUCT DISORDER, UNDIFFERENTIATED TYPE: Primary | ICD-10-CM

## 2018-08-15 DIAGNOSIS — F34.1 DYSTHYMIC DISORDER: ICD-10-CM

## 2018-08-15 NOTE — MR AVS SNAPSHOT
After Visit Summary   8/15/2018    Javi Salmeron    MRN: [id number]           Patient Information     Date Of Birth          1970        Visit Information        Provider Department      8/15/2018 4:00 PM Thomas Baugh, PhD [facility]        Today's Diagnoses     Conduct disorder, undifferentiated type    -  1    Dysthymic disorder           Follow-ups after your visit        Your next 10 appointments already scheduled     Sep 10, 2018  4:30 PM CDT   GROUP with [facility] CSB AC 2   [facility] ([facility])    [address]  Mail [address] 7521  [facility] [address]   [phone]            Sep 25, 2018  4:00 PM CDT   Individual Therapy 53+ minutes with Thomas Baugh, PhD [facility]   [facility] ([facility])    [address]  [address]  [facility] [address]   [phone]            Oct 01, 2018  4:30 PM CDT   GROUP with [facility] CSB AC 2   [facility] ([facility])    [address]  [address] 7521  [facility] [address]   [phone]            Oct 10, 2018  3:00 PM CDT   New Patient Visit with [facility] Becker MD   [facility] ([facility])    [facility]  [address]  [facility] [address]   [phone]            Oct 17, 2018  4:00 PM CDT   Diagnostic Update with Thomas Baugh, PhD [facility]   [facility] ([facility])    [address]  [address]  [facility] [address]   [phone]              Who to contact     Please call your clinic at [phone] to:    Ask questions about your health    Make or cancel appointments    Discuss your medicines    Learn about your test results    Speak to your doctor            Additional Information About Your Visit        Reata Pharmaceuticalshart Information     LikeBetter.com is an electronic gateway that provides easy, online access to your medical records. With [email or web address], you  can request a clinic appointment, read your test results, renew a prescription or communicate with your care team.     To sign up for MySongToYou visit the website at [email or web address]   You will be asked to enter the access code listed below, as well as some personal information. Please follow the directions to create your username and password.     Your access code is: [email or web address]  Expires: 10/20/2018  9:26 AM     Your access code will  in 90 days. If you need help or a new code, please contact your [facility] or call [phone] for assistance.        Care EveryWhere ID     This is your Care EveryWhere ID. This could be used by other organizations to access your [place] medical records  [id number]         Blood Pressure from Last 3 Encounters:   10/17/17 (!) 144/97   16 134/88    Weight from Last 3 Encounters:   10/27/17 90 kg (198 lb 8 oz)   10/17/17 92.1 kg (203 lb)   16 90.7 kg (200 lb)              We Performed the Following     Individual Psychotherapy (53+ min) [34363]        Primary Care Provider Office Phone # Fax #    Samy Duckworthvirgen [phone]       [address] [facility] [address]  [facility] [address]        Equal Access to Services     ANSHUL PALMER : Hadii aad ku hadasho Soomaali, waaxda luqadaha, qaybta kaalmada adeegyada, ying lainez. So [facility] [phone].    ATENCIÓN: Si habla español, tiene a horton disposición servicios gratuitos de asistencia lingüística. Tommy al [phone].    We comply with applicable federal civil rights laws and [place] laws. We do not discriminate on the basis of race, color, national origin, age, disability, sex, sexual orientation, or gender identity.            Thank you!     Thank you for choosing [facility]  for your care. Our goal is always to provide you with excellent care. Hearing back from our patients is one way we can continue to improve our  services. Please take a few minutes to complete the written survey that you may receive in the mail after your visit with us. Thank you!             Your Updated Medication List - Protect others around you: Learn how to safely use, store and throw away your medicines at www.disposemymeds.org.          This list is accurate as of 8/15/18 11:59 PM.  Always use your most recent med list.                   Brand Name Dispense Instructions for use Diagnosis    Cervical Traction Kit     1 kit    Use at home as needed and guided by Physical therapy    Cervicalgia       citalopram 40 MG tablet    celeXA    30 tablet    Take 1 tablet (40 mg) by mouth every morning    Depression       naltrexone 50 MG tablet    DEPADE;REVIA    90 tablet    Take 1 tablet (50 mg) by mouth every morning    Unspecified psychosexual disorder

## 2018-09-04 NOTE — PROGRESS NOTES
[facility]  Case Progress Note    8/15/18    Client Name: Javi Salmeron  YOB: 1970  MRN:  [id number]  Treating Provider: Connie Baugh, PhD  Type of Session: Individual  Number of Minutes: 55    Current Symptoms/Status:  Feeling less depressed.  Managing his sexual urges and boundaries.    Progress Toward Treatment Goals:   Using support system.        Intervention: Modality and Description:  Individual therapy, CBT, interpersonal    Response to Intervention:  Still dealing with stress related to mother's denial.  But feeling more settled about all of the issues of finding out about his real father.  Having a good relationship with him.  Balanced.  Improved relationship with son and girlfriend.  Maintaining boundaries.    Assignment:  Stay within boundaries.       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  Return for aftercare support group.

## 2018-09-10 ENCOUNTER — OFFICE VISIT (OUTPATIENT)
Dept: OTHER | Facility: OUTPATIENT CENTER | Age: 48
End: 2018-09-10

## 2018-09-10 DIAGNOSIS — Z51.89 AFTERCARE: Primary | ICD-10-CM

## 2018-09-10 NOTE — PROGRESS NOTES
[facility]  Case Progress Note    9/10/18    Client Name: Javi Salmeron  YOB: 1970  MRN:  [id number]  Treating Provider: Connie Baugh, PhD  Type of Session: Aftercare  Number of Minutes: 120    Current Symptoms/Status:  Still quite distressed about finding out about his real father  Mother not wanting to be honest.    Progress Toward Treatment Goals:   Using support system.   Stressed  about learning about who is real father is.    Intervention: Modality and Description:  Aftercare support group.    Response to Intervention:  Connecting with bio dad more.  More opportunity for communication which is making him feel good.    Active particpant in group.      Assignment:  Stay within boundaries.       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

## 2018-09-10 NOTE — MR AVS SNAPSHOT
After Visit Summary   9/10/2018    Javi Salmeron    MRN: [id number]           Patient Information     Date Of Birth          1970        Visit Information        Provider Department      9/10/2018 4:30 PM SE UMP CSB AC 2 [facility]        Today's Diagnoses     Aftercare    -  1       Follow-ups after your visit        Your next 10 appointments already scheduled     Sep 25, 2018  4:00 PM CDT   Individual Therapy 53+ minutes with Thomas Baugh, PhD [facility]   [facility] ([facility])    [address]  [address]  [facility] [address]   [phone]            Oct 01, 2018  4:30 PM CDT   GROUP with SE UMP CSB AC 2   [facility] ([facility])    [address]  [address]  [facility] [address]   [phone]            Oct 10, 2018  3:00 PM CDT   New Patient Visit with Mt Becker MD   [facility] ([facility])    [facility]  [address]  [facility] [address]   [phone]            Oct 17, 2018  4:00 PM CDT   Diagnostic Update with Thomas Baugh, PhD TYLER   [place] [facility] ([facility])    [address]  [address]  [facility] [address]   [phone]            Nov 05, 2018  4:30 PM CST   GROUP with SE UMP CSB AC 2   [facility] ([facility])    [address]  [address]  [facility] [address]   [phone]            Dec 03, 2018  4:30 PM CST   GROUP with SE UMP CSB AC 2   [facility] ([facility])    [address]  [address]  [facility] [address]   [phone]            Jan 07, 2019  4:30 PM CST   GROUP with SE UMP CSB AC 2   [facility] ([facility])    [address]  [address]  [facility] [address]   [phone]            Feb 04, 2019  4:30 PM CST   GROUP with SE UMP CSB AC 2   [facility] ([facility])    [address]  [address] 75[address]  [facility] [address]   [phone]            Mar 04, 2019  4:30 PM CST   GROUP with [facility] CSB AC 2   [facility] ([facility])    [address]  [address]  [facility] [address]   [phone]              Who to contact     Please call your clinic at [phone] to:    Ask questions about your health    Make or cancel appointments    Discuss your medicines    Learn about your test results    Speak to your doctor            Additional Information About Your Visit        Merge SocialharMyTrainer Information     Wanova is an electronic gateway that provides easy, online access to your medical records. With Wanova, you can request a clinic appointment, read your test results, renew a prescription or communicate with your care team.     To sign up for Wanova visit the website at [email or web address]   You will be asked to enter the access code listed below, as well as some personal information. Please follow the directions to create your username and password.     Your access code is: [email or web address]  Expires: 10/20/2018  9:26 AM     Your access code will  in 90 days. If you need help or a new code, please contact your [facility] or call [phone] for assistance.        Care EveryWhere ID     This is your Care EveryWhere ID. This could be used by other organizations to access your [place] medical records  [id number]         Blood Pressure from Last 3 Encounters:   10/17/17 (!) 144/97   16 134/88    Weight from Last 3 Encounters:   10/27/17 90 kg (198 lb 8 oz)   10/17/17 92.1 kg (203 lb)   16 90.7 kg (200 lb)              Today, you had the following     No orders found for display       Primary Care Provider Office Phone # Fax #    Mt Becker [phone]       [address] [place] [address]   [facility] [address]        Equal Access to Services      ANSHUL PALMER : Hadii aad emory rosa Rodriguez, waaxda luqadaha, qaybta kaalmada joseecarlosjaren, ying smithjosechristiano lainez. So [facility] [phone].    ATENCIÓN: Si habla español, tiene a horton disposición servicios gratuitos de asistencia lingüística. Llame al [phone].    We comply with applicable federal civil rights laws and [place] laws. We do not discriminate on the basis of race, color, national origin, age, disability, sex, sexual orientation, or gender identity.            Thank you!     Thank you for choosing [facility]  for your care. Our goal is always to provide you with excellent care. Hearing back from our patients is one way we can continue to improve our services. Please take a few minutes to complete the written survey that you may receive in the mail after your visit with us. Thank you!             Your Updated Medication List - Protect others around you: Learn how to safely use, store and throw away your medicines at www.disposemymeds.org.          This list is accurate as of 9/10/18 11:59 PM.  Always use your most recent med list.                   Brand Name Dispense Instructions for use Diagnosis    Cervical Traction Kit     1 kit    Use at home as needed and guided by Physical therapy    Cervicalgia       citalopram 40 MG tablet    celeXA    30 tablet    Take 1 tablet (40 mg) by mouth every morning    Depression       naltrexone 50 MG tablet    DEPADE;REVIA    90 tablet    Take 1 tablet (50 mg) by mouth every morning    Unspecified psychosexual disorder

## 2018-09-25 ENCOUNTER — OFFICE VISIT (OUTPATIENT)
Dept: OTHER | Facility: OUTPATIENT CENTER | Age: 48
End: 2018-09-25
Payer: COMMERCIAL

## 2018-09-25 DIAGNOSIS — F91.8 CONDUCT DISORDER, UNDIFFERENTIATED TYPE: Primary | ICD-10-CM

## 2018-09-25 DIAGNOSIS — F34.1 DYSTHYMIC DISORDER: ICD-10-CM

## 2018-09-25 NOTE — MR AVS SNAPSHOT
After Visit Summary   9/25/2018    Javi Salmeron    MRN: [id number]           Patient Information     Date Of Birth          1970        Visit Information        Provider Department      9/25/2018 4:00 PM Thomas Baugh, PhD [facility]        Today's Diagnoses     Conduct disorder, undifferentiated type    -  1    Dysthymic disorder           Follow-ups after your visit        Your next 10 appointments already scheduled     Oct 10, 2018  3:00 PM CDT   New Patient Visit with [facility] MD   [facility] ([facility])    [facility]  [address]  [facility] [address]   [phone]            Oct 17, 2018  4:00 PM CDT   Diagnostic Update with Thomas Baugh, PhD T[facility]ER   [facility] ([facility])    [address]  Mail [address] 7521  [facility] [address]   [phone]            Nov 05, 2018  4:30 PM CST   GROUP with SE UMP CSB AC 2   [place] [facility] ([facility])    [address]  [address]  [facility] [address]   [phone]            Dec 03, 2018  4:30 PM CST   GROUP with SE UMP CSB AC 2   [place] [facility] ([facility])    [address]  [address]  [facility] [address]   [phone]            Dec 12, 2018  4:00 PM CST   Individual Therapy 53+ minutes with Thomas Baugh, PhD [facility]   [facility] ([facility])    [address]  [address]  [facility] [address]   [phone]            Jan 07, 2019  4:30 PM CST   GROUP with SE UMP CSB AC 2   [facility] ([facility])    [address]  [address]  [facility] [address]   [phone]            Feb 04, 2019  4:30 PM CST   GROUP with SE [facility] CSB AC 2   [facility] ([facility])    [address]  [address]  [facility] [address]   [phone]            Mar 04, 2019   4:30 PM CST   GROUP with [facility] CSB AC 2   [facility] ([facility])    [address]  [address] 7521  [facility] [address]   [phone]              Who to contact     Please call your clinic at [phone] to:    Ask questions about your health    Make or cancel appointments    Discuss your medicines    Learn about your test results    Speak to your doctor            Additional Information About Your Visit        MyChart Information     Dogecoin is an electronic gateway that provides easy, online access to your medical records. With Dogecoin, you can request a clinic appointment, read your test results, renew a prescription or communicate with your care team.     To sign up for Dogecoin visit the website at [email or web address]   You will be asked to enter the access code listed below, as well as some personal information. Please follow the directions to create your username and password.     Your access code is: [email or web address]  Expires: 10/20/2018  9:26 AM     Your access code will  in 90 days. If you need help or a new code, please contact your [facility] or call [phone] for assistance.        Care EveryWhere ID     This is your Care EveryWhere ID. This could be used by other organizations to access your [facility] medical records  [id number]         Blood Pressure from Last 3 Encounters:   10/17/17 (!) 144/97   16 134/88    Weight from Last 3 Encounters:   10/27/17 90 kg (198 lb 8 oz)   10/17/17 92.1 kg (203 lb)   16 90.7 kg (200 lb)              We Performed the Following     Individual Psychotherapy (53+ min) [06000]        Primary Care Provider Office Phone # Fax #    Mt Becker [phone]       [address] [place] [address]   [facility] [address]        Equal Access to Services     ANSHUL PALMER : Alexis Rodriguez, tom stroud, cherie garcia, ying ceballos  nghia smithjosechristiano pachecoaaaries ah. So [facility] [phone].    ATENCIÓN: Si habla chantell, tiene a horton disposición servicios gratuitos de asistencia lingüística. Tommy al [phone].    We comply with applicable federal civil rights laws and [place] laws. We do not discriminate on the basis of race, color, national origin, age, disability, sex, sexual orientation, or gender identity.            Thank you!     Thank you for choosing [facility]  for your care. Our goal is always to provide you with excellent care. Hearing back from our patients is one way we can continue to improve our services. Please take a few minutes to complete the written survey that you may receive in the mail after your visit with us. Thank you!             Your Updated Medication List - Protect others around you: Learn how to safely use, store and throw away your medicines at www.disposemymeds.org.          This list is accurate as of 9/25/18 11:59 PM.  Always use your most recent med list.                   Brand Name Dispense Instructions for use Diagnosis    Cervical Traction Kit     1 kit    Use at home as needed and guided by Physical therapy    Cervicalgia       citalopram 40 MG tablet    celeXA    30 tablet    Take 1 tablet (40 mg) by mouth every morning    Depression       naltrexone 50 MG tablet    DEPADE;REVIA    90 tablet    Take 1 tablet (50 mg) by mouth every morning    Unspecified psychosexual disorder

## 2018-10-01 ENCOUNTER — OFFICE VISIT (OUTPATIENT)
Dept: OTHER | Facility: OUTPATIENT CENTER | Age: 48
End: 2018-10-01

## 2018-10-01 DIAGNOSIS — Z51.89 AFTERCARE: Primary | ICD-10-CM

## 2018-10-01 NOTE — PROGRESS NOTES
[place] [facility]  Case Progress Note    10/01/18    Client Name: Javi Salmeron  YOB: 1970  MRN:  [id number]  Treating Provider: Connie Baugh, PhD  Type of Session: Aftercare  Number of Minutes: 120    Current Symptoms/Status:  Feeling good about his progress.    Progress Toward Treatment Goals:   Using support system.       Intervention: Modality and Description:  Aftercare support group.    Response to Intervention:  Did not take time.    Active particpant in group.      Assignment:  Stay within boundaries.       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

## 2018-10-01 NOTE — PROGRESS NOTES
[place] [facility]  Case Progress Note    9/25/18    Client Name: Javi Salmeron  YOB: 1970  MRN:  [id number]  Treating Provider: Connie Baugh, PhD  Type of Session: Individual  Number of Minutes: 55    Current Symptoms/Status:  Staying within boundaries.  Depression stable.  Feeling much better about family dynamics.   Mother not wanting to be honest.    Progress Toward Treatment Goals:   Using support system.    Feeling more calm.    Intervention: Modality and Description:  Individual therapy, cbt, interpersonal.    Response to Intervention:  Dealing with issues with mother.  Needs to set more limits with her.  Relationship with son going well.    Feels that CSB is in chceck - using support system.        Assignment:  Stay within boundaries.       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  Continue aftercare group.

## 2018-10-01 NOTE — MR AVS SNAPSHOT
After Visit Summary   10/1/2018    Javi Salmeron    MRN: [id number]           Patient Information     Date Of Birth          1970        Visit Information        Provider Department      10/1/2018 4:30 PM [facility] CSB AC 2 [facility]        Today's Diagnoses     Aftercare    -  1       Follow-ups after your visit        Your next 10 appointments already scheduled     Oct 10, 2018  3:00 PM CDT   New Patient Visit with Mt Becker MD   [facility] ([facility])    [facility]  [address]  [facility] [address]   [phone]            Oct 17, 2018  4:00 PM CDT   Diagnostic Update with Thomas Baugh, PhD [facility] ([facility])    [address]  Mail [address] 7521  [facility] [address]   [phone]            Nov 05, 2018  4:30 PM CST   GROUP with SE UMP CSB AC 2   [facility] ([facility])    [address]  Mail [address] 75[address]  [facility] [address]   [phone]            Dec 03, 2018  4:30 PM CST   GROUP with SE [facility]MP CSB AC 2   [facility] ([facility])    [address]  Mail [address] 7521  [facility] [address]   [phone]            Dec 12, 2018  4:00 PM CST   Individual Therapy 53+ minutes with Thomas Baugh, PhD [facility] ([facility])    [address]  [address]  [facility] [address]   [phone]            Jan 07, 2019  4:30 PM CST   GROUP with SE UMP CSB AC 2   [facility] ([facility])    [address]  Mail [address] 75[address]  [facility] [address]   [phone]            Feb 04, 2019  4:30 PM CST   GROUP with SE UMP CSB AC 2   [facility] ([facility])    [address]  [address]  [facility] [address]   [phone]            Mar 04, 2019  4:30 PM CST   GROUP with SE UMP CSB AC 2   [facility] ([facility])    [address]  [address] 75[address]  [facility] [address]   [phone]              Who to contact     Please call your clinic at [phone] to:    Ask questions about your health    Make or cancel appointments    Discuss your medicines    Learn about your test results    Speak to your doctor            Additional Information About Your Visit        MyChart Information     Sunlight Photonicst is an electronic gateway that provides easy, online access to your medical records. With Plixi, you can request a clinic appointment, read your test results, renew a prescription or communicate with your care team.     To sign up for Plixi visit the website at [email or web address]   You will be asked to enter the access code listed below, as well as some personal information. Please follow the directions to create your username and password.     Your access code is: [email or web address]  Expires: 10/20/2018  9:26 AM     Your access code will  in 90 days. If you need help or a new code, please contact your [facility] or call [phone] for assistance.        Care EveryWhere ID     This is your Care EveryWhere ID. This could be used by other organizations to access your [facility] medical records  [id number]         Blood Pressure from Last 3 Encounters:   10/17/17 (!) 144/97   16 134/88    Weight from Last 3 Encounters:   10/27/17 90 kg (198 lb 8 oz)   10/17/17 92.1 kg (203 lb)   16 90.7 kg (200 lb)              We Performed the Following     Support Group - 120 Min. [26717.537]        Primary Care Provider Office Phone # Fax #    Mt Becker [phone]       [address] [facility] [address]  [facility] [address]        Equal Access to Services     ANSHUL PALMER : Alexis Rodriguez, tom stroud, cherie garcia, ying lainez. So [facility] [phone].    ATENCIÓN: Debra abdul,  tiene a horton disposición servicios gratuitos de asistencia lingüística. Tommy samson [phone].    We comply with applicable federal civil rights laws and [place] laws. We do not discriminate on the basis of race, color, national origin, age, disability, sex, sexual orientation, or gender identity.            Thank you!     Thank you for choosing [facility]  for your care. Our goal is always to provide you with excellent care. Hearing back from our patients is one way we can continue to improve our services. Please take a few minutes to complete the written survey that you may receive in the mail after your visit with us. Thank you!             Your Updated Medication List - Protect others around you: Learn how to safely use, store and throw away your medicines at www.disposemymeds.org.          This list is accurate as of 10/1/18 10:48 PM.  Always use your most recent med list.                   Brand Name Dispense Instructions for use Diagnosis    Cervical Traction Kit     1 kit    Use at home as needed and guided by Physical therapy    Cervicalgia       citalopram 40 MG tablet    celeXA    30 tablet    Take 1 tablet (40 mg) by mouth every morning    Depression       naltrexone 50 MG tablet    DEPADE;REVIA    90 tablet    Take 1 tablet (50 mg) by mouth every morning    Unspecified psychosexual disorder

## 2018-10-10 ENCOUNTER — OFFICE VISIT (OUTPATIENT)
Dept: FAMILY MEDICINE | Facility: CLINIC | Age: 48
End: 2018-10-10
Payer: COMMERCIAL

## 2018-10-10 VITALS
DIASTOLIC BLOOD PRESSURE: 100 MMHG | BODY MASS INDEX: 28.44 KG/M2 | OXYGEN SATURATION: 96 % | TEMPERATURE: 98.1 F | HEART RATE: 92 BPM | WEIGHT: 210 LBS | HEIGHT: 72 IN | SYSTOLIC BLOOD PRESSURE: 146 MMHG

## 2018-10-10 DIAGNOSIS — K52.9 DYSPEPTIC DIARRHEA: ICD-10-CM

## 2018-10-10 DIAGNOSIS — Z13.1 SCREENING FOR DIABETES MELLITUS: ICD-10-CM

## 2018-10-10 DIAGNOSIS — Z00.00 PREVENTATIVE HEALTH CARE: ICD-10-CM

## 2018-10-10 DIAGNOSIS — R03.0 ELEVATED BLOOD PRESSURE READING WITHOUT DIAGNOSIS OF HYPERTENSION: ICD-10-CM

## 2018-10-10 DIAGNOSIS — Z13.220 SCREENING CHOLESTEROL LEVEL: ICD-10-CM

## 2018-10-10 DIAGNOSIS — Z83.3 FAMILY HISTORY OF DIABETES MELLITUS: ICD-10-CM

## 2018-10-10 DIAGNOSIS — F42.9 OBSESSIVE-COMPULSIVE DISORDER, UNSPECIFIED TYPE: ICD-10-CM

## 2018-10-10 DIAGNOSIS — Z23 NEEDS FLU SHOT: Primary | ICD-10-CM

## 2018-10-10 LAB
ALBUMIN SERPL-MCNC: 3.8 G/DL (ref 3.3–4.6)
ALP SERPL-CCNC: 58 U/L (ref 40–150)
ALT SERPL-CCNC: 21 U/L (ref 0–70)
AST SERPL-CCNC: 28 U/L (ref 0–55)
BILIRUB SERPL-MCNC: 0.7 MG/DL (ref 0.2–1.3)
BUN SERPL-MCNC: 22 MG/DL (ref 5–24)
CALCIUM SERPL-MCNC: 9.3 MG/DL (ref 8.5–10.4)
CHLORIDE SERPLBLD-SCNC: 106 MMOL/L (ref 94–109)
CO2 SERPL-SCNC: 29 MMOL/L (ref 20–32)
CREAT SERPL-MCNC: 1.4 MG/DL (ref 0.8–1.5)
EGFR CALCULATED (BLACK REFERENCE): 69.3
EGFR CALCULATED (NON BLACK REFERENCE): 57.2
GLUCOSE SERPL-MCNC: 102 MG/DL (ref 60–109)
HBA1C MFR BLD: 5.6 % (ref 4.1–5.7)
POTASSIUM SERPL-SCNC: 4.5 MMOL/L (ref 3.4–5.3)
PROT SERPL-MCNC: 7.5 G/DL (ref 6.8–8.8)
SODIUM SERPL-SCNC: 145 MMOL/L (ref 133–144)

## 2018-10-10 ASSESSMENT — PAIN SCALES - GENERAL: PAINLEVEL: NO PAIN (0)

## 2018-10-10 NOTE — PATIENT INSTRUCTIONS
Javi is a 48 yo with Cerebral Palsy who presents to [facility] for his first visit.   I've seen him before for Neck pain.     He has Cerebral Palsy affecting his LEFT side.     A/P.  Elevated blood pressure, Javi states that it's normal when not around me (it was high in 2017 in the sports med clinic)  -Encouraged to focus on diet and exercise  -Suggest the DASH diet.   -Also, increase activity with increased steps (look into nordic poles)  -Use Step master at least 2-3 times/week    Dyspepsia  -Try Omeprazole 20 mg/day  -If symptoms persists, we should consider checking stool for H. Pylori  -Also, will be sent for screening colonoscopy    Takes Naltrexone for Obsessive Compulsive Disorder  -Check Comp panel    At risk for DM given family history  -Check A1C  -Also, weight loss (goal of 200 lbs by Fernie 3, 2019)    Fatigue from Question of allergies  -Check hemoglobin    Neck pain  -Return to Dr. Rodriguez    Preventive care  -Check HIV,   -Sent for colonoscopy  -Give flu vaccine    Follow up in 2 months for blood pressure and weight check, sooner if needed    --Mt Becker MD

## 2018-10-10 NOTE — MR AVS SNAPSHOT
After Visit Summary   10/10/2018    Javi Salmeron    MRN: [id number]           Patient Information     Date Of Birth          1970        Visit Information        Provider Department      10/10/2018 3:00 PM Mt Becker MD [facility]        Today's Diagnoses     Needs flu shot    -  1    Preventative health care        Screening cholesterol level        Screening for diabetes mellitus        Elevated blood pressure reading without diagnosis of hypertension        Dyspeptic diarrhea        Family history of diabetes mellitus        Obsessive-compulsive disorder, unspecified type          Care Instructions    Javi is a 48 yo with Cerebral Palsy who presents to [facility] for his first visit.   I've seen him before for Neck pain.     He has Cerebral Palsy affecting his LEFT side.     A/P.  Elevated blood pressure, aJvi states that it's normal when not around me (it was high in 2017 in the sports med clinic)  -Encouraged to focus on diet and exercise  -Suggest the DASH diet.   -Also, increase activity with increased steps (look into nordic poles)  -Use Step master at least 2-3 times/week    Dyspepsia  -Try Omeprazole 20 mg/day  -If symptoms persists, we should consider checking stool for H. Pylori  -Also, will be sent for screening colonoscopy    Takes Naltrexone for Obsessive Compulsive Disorder  -Check Comp panel    At risk for DM given family history  -Check A1C  -Also, weight loss (goal of 200 lbs by Fernie 3, 2019)    Fatigue from Question of allergies  -Check hemoglobin    Neck pain  -Return to Dr. Rodriguez    Preventive care  -Check HIV,   -Sent for colonoscopy  -Give flu vaccine    Follow up in 2 months for blood pressure and weight check, sooner if needed    --Mt Becker MD          Follow-ups after your visit        Additional Services     GASTROENTEROLOGY ADULT REF PROCEDURE ONLY       Last Lab Result: No results found for: CR  Body mass index is 28.47 kg/(m^2).      Needed:  No  Language:  English    Patient will be contacted to schedule procedure.     Please be aware that coverage of these services is subject to the terms and limitations of your health insurance plan.  Call member services at your health plan with any benefit or coverage questions.  Any procedures must be performed at a [facility] facility OR coordinated by your clinic's referral office.    Please bring the following with you to your appointment:    (1) Any X-Rays, CTs or MRIs which have been performed.  Contact the facility where they were done to arrange for  prior to your scheduled appointment.    (2) List of current medications   (3) This referral request   (4) Any documents/labs given to you for this referral                  Your next 10 appointments already scheduled     Oct 17, 2018  4:00 PM CDT   Diagnostic Update with Thomas Baugh, PhD TYLER   [facility] ([facility])    [address]  Mail [address] 7521  [facility] [address]   [phone]            Nov 05, 2018  4:30 PM CST   GROUP with SE UMP CSB AC 2   [facility] ([facility])    [address]  Mail Code 7521  [facility] [place] [address]   [phone]            Dec 03, 2018  4:30 PM CST   GROUP with SE UMP CSB AC 2   [facility] ([facility])    [address]  Mail Code 7521  [facility] [place] [address]   [phone]            Dec 12, 2018  4:00 PM CST   Individual Therapy 53+ minutes with Thomas Baugh, PhD TYLER   [facility] ([facility])    [address]  Mail [address] 7521  [facility] [address]   [phone]            Jan 07, 2019  4:30 PM CST   GROUP with SE UMP CSB AC 2   [facility] ([facility])    [address]  Mail Code 7521  [facility] [place] [address]   [phone]            Feb 04, 2019  4:30 PM CST   GROUP with SE UMP CSB AC 2   [facility] ([facility])     [address] [facility] [address]  Mail [address] 7521  [facility] [address]   [phone]            Mar 04, 2019  4:30 PM CST   GROUP with SE [facility] CSB AC 2   [facility] ([facility])    [address] [facility] [address]  Mail [address] 7521  [facility] [address]   [phone]              Who to contact     Please call your clinic at [phone] to:    Ask questions about your health    Make or cancel appointments    Discuss your medicines    Learn about your test results    Speak to your doctor            Additional Information About Your Visit        Alga EnergyharNeuroSigma Information     Explore Engage is an electronic gateway that provides easy, online access to your medical records. With Explore Engage, you can request a clinic appointment, read your test results, renew a prescription or communicate with your care team.     To sign up for Explore Engage visit the website at [email or web address]   You will be asked to enter the access code listed below, as well as some personal information. Please follow the directions to create your username and password.     Your access code is: [email or web address]  Expires: 10/20/2018  9:26 AM     Your access code will  in 90 days. If you need help or a new code, please contact your [facility] or call [phone] for assistance.        Care EveryWhere ID     This is your Care EveryWhere ID. This could be used by other organizations to access your [facility] medical records  [id number]        Your Vitals Were     Pulse Temperature Height Pulse Oximetry BMI (Body Mass Index)       92 98.1  F (36.7  C) (Oral) 6' 0.01\" (182.9 cm) 96% 28.47 kg/m2        Blood Pressure from Last 3 Encounters:   10/10/18 (!) 146/100   10/17/17 (!) 144/97   16 134/88    Weight from Last 3 Encounters:   10/10/18 210 lb (95.3 kg)   10/27/17 198 lb 8 oz (90 kg)   10/17/17 203 lb (92.1 kg)              We Performed the Following     ADMIN INFLUENZA VIRUS VACCINE     C RIV4 (FLUBLOK) VACCINE RECOMBINANT " DNA PRSRV ANTIBIO FREE, IM     Comprehensive Metabolic Panel ([place])     GASTROENTEROLOGY ADULT REF PROCEDURE ONLY     Hemoglobin A1c ([place])     HIV Antigen Antibody Combo     HOME BLOOD PRESSURE MONITORING     Lipid Panel ([place])          Today's Medication Changes          These changes are accurate as of 10/10/18  4:07 PM.  If you have any questions, ask your nurse or doctor.               Start taking these medicines.        Dose/Directions    omeprazole 20 MG CR capsule   Commonly known as:  priLOSEC   Used for:  Dyspeptic diarrhea   Started by:  Mt Becker MD        Dose:  20 mg   Take 1 capsule (20 mg) by mouth daily   Quantity:  30 capsule   Refills:  1            Where to get your medicines      These medications were sent to [facility] [address] [place] [address] - [address] AT [facility] [address], [place] [address]     Phone:  [phone]     omeprazole 20 MG CR capsule                Primary Care Provider Office Phone # Fax #    Mt Becker [phone]       [address] [facility] [address]  [facility] [address]        Equal Access to Services     HELENA [facility] CenterREBECCA AH: Hadii aad ku hadasho Soomaali, waaxda luqadaha, qaybta kaalmada adeegyada, waxay idiin hayaan adeeg saraharachristiano carmona . So [facility] [phone].    ATENCIÓN: Si habla español, tiene a horton disposición servicios gratuitos de asistencia lingüística. Llame al [phone].    We comply with applicable federal civil rights laws and [place] laws. We do not discriminate on the basis of race, color, national origin, age, disability, sex, sexual orientation, or gender identity.            Thank you!     Thank you for choosing [facility]  for your care. Our goal is always to provide you with excellent care. Hearing back from our patients is one way we can continue to improve our services. Please take a few minutes to complete the written survey that you may  receive in the mail after your visit with us. Thank you!             Your Updated Medication List - Protect others around you: Learn how to safely use, store and throw away your medicines at www.disposemymeds.org.          This list is accurate as of 10/10/18  4:07 PM.  Always use your most recent med list.                   Brand Name Dispense Instructions for use Diagnosis    Cervical Traction Kit     1 kit    Use at home as needed and guided by Physical therapy    Cervicalgia       citalopram 40 MG tablet    celeXA    30 tablet    Take 1 tablet (40 mg) by mouth every morning    Depression       naltrexone 50 MG tablet    DEPADE;REVIA    90 tablet    Take 1 tablet (50 mg) by mouth every morning    Unspecified psychosexual disorder       omeprazole 20 MG CR capsule    priLOSEC    30 capsule    Take 1 capsule (20 mg) by mouth daily    Dyspeptic diarrhea

## 2018-10-10 NOTE — PROGRESS NOTES
"REASON FOR VISIT: Establish care, physical      HISTORY OF PRESENT ILLNESS: Javi Salmeron is a 47 year old male who presents to [facility] and for an annual physical. He has cerebral palsy, affecting primarily the left side, both in his upper and lower extremeties. I have seen him in the past for LEFT sided C-spine nerve entrapment. He has continued to have chronic neck pain. He has had several cortisone shots and did PT. He saw Dr. Rodriguez in orthopedics and surgery was recommended, but he is not ready to pursue that at this time. He reports he is \"doing fine\" now but he is still sensing some weakness in his arms.    His BP was elevated today, which he attributes to white coat hypertension as well as stress (see below). He reports having normal BPs at the dentist.    He has been feeling fatigued, which he attributes to his allergies. He has seasonal allergies, and also has a pet cat despite being allergic.    He reports some diarrhea. He used to be able to eat spicy foods, and now anything with any sort of spice causes diarrhea. He also reports having diarrhea with nerves. He has some abdominal pain as well, which resolves after BM.  He has not taken omeprazole. He has never been tested for H. Pylori.    He has been under some stress, because he took a DNA test and learned that his father growing up was not his biological father. His biological father was a serviceman and met his mother in [place]. His family knew about this, but he only found out recently. The situation has caused some tension in his family, and he is struggling with that.    He has a family history of diabetes, and would like to be screened for that. He believes his weight has increased. He is not exercising regularly. He lifts weights with his sons occasionally, but is hindered by his cerebral palsy and neck pain. He has never done yoga or dilcia chi. He drinks 20-40oz of soda/day.    His partner reports that he snores. No enuresis.      He takes " "naltrexone 50mg for his OCD, and needs a liver function test. He sees a psychiatrist, and has been working on his compulsions for about 6 years. Sees Connie Baugh, PhD.       SOCIAL HISTORY: He lives in [place]. 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 He is . He has 3 sons, one 17 and 13 year old twins.    FAMILY, MEDICAL AND PAST SURGICAL HISTORIES:  Unchanged.       MEDICATIONS:  Reviewed.       HEALTHCARE MAINTENANCE:    Immunizations: Due for flu vaccine.  HIV screening: Accepts HIV screening.  Colonoscopy: Referral placed.  EtOH:   Activity: Not exercising regularly.    Health Maintenance   Topic Date Due     DEPRESSION ACTION PLAN Q1 YR  10/20/1986     HIV SCREEN (SYSTEM ASSIGNED)  10/20/1986     LIPID SCREEN Q5 YR MALE (SYSTEM ASSIGNED)  10/20/2003     PHQ-9 Q6 MONTHS  03/13/2017     Mental Health Tx Plan Q11 MOS  08/06/2018     INFLUENZA VACCINE (1) 09/01/2018     RAGINI QUESTIONNAIRE 1 YEAR  09/06/2018     TETANUS IMMUNIZATION (SYSTEM ASSIGNED)  09/06/2027       REVIEW OF SYSTEMS:  A 10-point review is negative.       OBJECTIVE:    EXAM:  GENERAL: Alert, pleasant, NAD. Walks with an Antalgic gait due to his Cerebral Palsy  VITAL SIGNS: BP (!) 146/100 (BP Location: Right arm, Patient Position: Chair, Cuff Size: Adult Regular)  Pulse 92  Temp 98.1  F (36.7  C) (Oral)  Ht 6' 0.01\" (182.9 cm)  Wt 210 lb (95.3 kg)  SpO2 96%  BMI 28.47 kg/m2  HENT: TM normal bilaterally. Nose and mouth without lesions.  NECK: No adenopathy, thyroid normal to palpation  RESP: Lungs clear to auscultation bilaterally  CV: Regular rate and rhythm, normal S1 S2, no murmur, no carotid bruits  ABDOMEN: Soft, nontender, without HSM or masses. Bowel sounds normal.   and Rectal - Deferred. Patient had no concerns.   MS: Antalgic Gait. No tender, hot or swollen joints. Full ROM in both hips. Rght arm slightly longer than left. Ulnar nerve strength intact, median nerve strength intact, biceps strength intact, triceps " strength intact.  SKIN: No suspicious lesions or rashes  NEURO: Normal strength and tone, sensory exam grossly normal. Normal reflexes.  PSYCH: Mentation appears normal. Affect normal/bright.  EXT: No peripheral edema      LABORATORY DATA: Labs pending      ASSESSMENT AND PLAN:   Javi is a 48 yo with Cerebral Palsy who presents to [facility] for his first visit.   I've seen him before for Neck pain.     He has Cerebral Palsy affecting his LEFT side.     A/P.  Elevated blood pressure, Javi states that it's normal when not around me (it was high in 2017 in the sports med clinic)  -Encouraged to focus on diet and exercise  -Suggest the DASH diet.   -Also, increase activity with increased steps (look into nordic poles)  -Use Step master at least 2-3 times/week    Dyspepsia  -Try Omeprazole 20 mg/day  -If symptoms persists, we should consider checking stool for H. Pylori  -Also, will be sent for screening colonoscopy    Takes Naltrexone for Obsessive Compulsive Disorder  -Check Comp panel    At risk for DM given family history  -Check A1C  -Also, weight loss (goal of 200 lbs by Fernie 3, 2019)    Fatigue from Question of allergies  -Check hemoglobin    Neck pain  -Return to Dr. Rodriguez    Preventive care  -Check HIV,   -Sent for colonoscopy  -Give flu vaccine    Follow up in 2 months for blood pressure and weight check, sooner if needed  Call with any problems or questions.     --Mt Becker MD      I, Mely Jose, am serving as a scribe to document services personally performed by Dr. Mt Becker, based on data collection and the provider's statements to me.

## 2018-10-10 NOTE — LETTER
"                                      [facility]  [address], [address]  [place] [address]  Phone: [phone]  Fax: [phone]       Date: October 12, 2018      Javi Salmeron  [address]  [place] [address]        Hi Javi,  Your Cholesterol levels came back a bit high and with a low HDL (\"good\") cholesterol.     Currently, your 10-year risk calculation is a 7.7% without a medication of changes in lifestyle.   With medication (e.g. Lipitor), this risk estimated would be lowered to about 5%.   My suggestion is to work hard on the diet and exercise along with the weight loss that we discussed and then have labs done \"fasting\" in about 6-8 weeks and we should discuss again.   Let me know if you have questions,  --Mt Becker MD    The 10-year ASCVD risk score (Francoise ACOSTA Jr, et al., 2013) is: 7.7%    Values used to calculate the score:      Age: 47 years      Sex: Male      Is Non- : No      Diabetic: No      Tobacco smoker: No      Systolic Blood Pressure: 146 mmHg      Is BP treated: No      HDL Cholesterol: 30 mg/dL      Total Cholesterol: 219 mg/dL    Resulted Orders   Hemoglobin A1c ([place])   Result Value Ref Range    Hemoglobin A1C 5.6 4.1 - 5.7 %   Comprehensive Metabolic Panel ([place])   Result Value Ref Range    Glucose 102.0 60.0 - 109.0 mg/dL    Urea Nitrogen 22.0 5.0 - 24.0 mg/dL    Calcium 9.3 8.5 - 10.4 mg/dL    Creatinine 1.4 0.8 - 1.5 mg/dL    eGFR Calculated (Non Black Reference) 57.2 (L) >60.0    eGFR Calculated (Black Reference) 69.3 >60.0    Sodium 145.0 (H) 133.0 - 144.0 mmol/L    Potassium 4.5 3.4 - 5.3 mmol/L    Chloride 106.0 94.0 - 109.0 mmol/L    Carbon Dioxide 29.0 20.0 - 32.0 mmol/L    Albumin 3.8 3.3 - 4.6 g/dL    Alkaline Phosphatase 58.0 40.0 - 150.0 U/L    ALT 21.0 0.0 - 70.0 U/L    AST 28.0 0.0 - 55.0 U/L    Bilirubin Total 0.7 0.2 - 1.3 mg/dL    Protein Total 7.5 6.8 - 8.8 g/dL   Lipid Profile "   Result Value Ref Range    Cholesterol 219 (H) <200 mg/dL      Comment:      Desirable:       <200 mg/dl    Triglycerides 424 (H) <150 mg/dL      Comment:      Borderline high:  150-199 mg/dl  High:             200-499 mg/dl  Very high:       >499 mg/dl      HDL Cholesterol 30 (L) >39 mg/dL    LDL Cholesterol Calculated  <100 mg/dL     Cannot estimate LDL when triglyceride exceeds 400 mg/dL    Non HDL Cholesterol 189 (H) <130 mg/dL      Comment:      Above Desirable:  130-159 mg/dl  Borderline high:  160-189 mg/dl  High:             190-219 mg/dl  Very high:       >219 mg/dl

## 2018-10-10 NOTE — NURSING NOTE
"47 year old  Chief Complaint   Patient presents with     [facility]     Physical       Blood pressure (!) 146/100, pulse 92, temperature 98.1  F (36.7  C), temperature source Oral, height 6' 0.01\" (182.9 cm), weight 210 lb (95.3 kg), SpO2 96 %. Body mass index is 28.47 kg/(m^2).  Patient Active Problem List   Diagnosis     Dysthymic disorder     Conduct disorder, undifferentiated type     Aftercare     Other cerebral palsy (H)     Cervical radiculopathy       Wt Readings from Last 2 Encounters:   10/10/18 210 lb (95.3 kg)   10/27/17 198 lb 8 oz (90 kg)     BP Readings from Last 3 Encounters:   10/10/18 (!) 146/100   10/17/17 (!) 144/97   07/30/16 134/88         Current Outpatient Prescriptions   Medication     citalopram (CELEXA) 40 MG tablet     naltrexone (DEPADE;REVIA) 50 MG tablet     Misc. Devices (CERVICAL TRACTION) KIT     No current facility-administered medications for this visit.        Social History   Substance Use Topics     Smoking status: Never Smoker     Smokeless tobacco: Never Used     Alcohol use Yes      Comment: one or two a month       Health Maintenance Due   Topic Date Due     DEPRESSION ACTION PLAN Q1 YR  10/20/1986     HIV SCREEN (SYSTEM ASSIGNED)  10/20/1986     LIPID SCREEN Q5 YR MALE (SYSTEM ASSIGNED)  10/20/2003     PHQ-9 Q6 MONTHS  03/13/2017     Mental Health Tx Plan Q11 MOS  08/06/2018     INFLUENZA VACCINE (1) 09/01/2018     RAGINI QUESTIONNAIRE 1 YEAR  09/06/2018       No results found for: LAXMI Sandoval MA  October 10, 2018 3:13 PM    Injectable Influenza Immunization Documentation    1.  Has the patient received the information for the injectable influenza vaccine? YES     2. Is the patient 6 months of age or older? YES     3. Does the patient have any of the following contraindications?         Severe allergy to eggs? No     Severe allergic reaction to previous influenza vaccines? No   Severe allergy to latex? No       History of Guillain-[place] syndrome? No     " "Currently have a temperature greater than 100.4F? No        4.  Severely egg allergic patients should have flu vaccine eligibility assessed by an MD, RN, or pharmacist, and those who received flu vaccine should be observed for 15 min by an MD, RN, Pharmacist, Medical Technician, or member of clinic staff.\": YES    5. Latex-allergic patients should be given latex-free influenza vaccine No. Please reference the Vaccine latex table to determine if your clinic s product is latex-containing.       Vaccination given by Elen Sandoval CMA          "

## 2018-10-11 LAB
CHOLEST SERPL-MCNC: 219 MG/DL
HDLC SERPL-MCNC: 30 MG/DL
LDLC SERPL CALC-MCNC: ABNORMAL MG/DL
NONHDLC SERPL-MCNC: 189 MG/DL
TRIGL SERPL-MCNC: 424 MG/DL

## 2018-10-12 LAB — HIV 1+2 AB+HIV1 P24 AG SERPL QL IA: NONREACTIVE

## 2018-10-12 ASSESSMENT — ANXIETY QUESTIONNAIRES
1. FEELING NERVOUS, ANXIOUS, OR ON EDGE: SEVERAL DAYS
3. WORRYING TOO MUCH ABOUT DIFFERENT THINGS: NOT AT ALL
6. BECOMING EASILY ANNOYED OR IRRITABLE: NOT AT ALL
2. NOT BEING ABLE TO STOP OR CONTROL WORRYING: NOT AT ALL
5. BEING SO RESTLESS THAT IT IS HARD TO SIT STILL: NOT AT ALL
GAD7 TOTAL SCORE: 1
7. FEELING AFRAID AS IF SOMETHING AWFUL MIGHT HAPPEN: NOT AT ALL

## 2018-10-12 ASSESSMENT — PATIENT HEALTH QUESTIONNAIRE - PHQ9: 5. POOR APPETITE OR OVEREATING: NOT AT ALL

## 2018-10-13 ASSESSMENT — ANXIETY QUESTIONNAIRES: GAD7 TOTAL SCORE: 1

## 2018-10-13 ASSESSMENT — PATIENT HEALTH QUESTIONNAIRE - PHQ9: SUM OF ALL RESPONSES TO PHQ QUESTIONS 1-9: 3

## 2018-10-17 ENCOUNTER — OFFICE VISIT (OUTPATIENT)
Dept: OTHER | Facility: OUTPATIENT CENTER | Age: 48
End: 2018-10-17
Payer: COMMERCIAL

## 2018-10-17 DIAGNOSIS — F34.1 DYSTHYMIC DISORDER: ICD-10-CM

## 2018-10-17 DIAGNOSIS — F32.5 MAJOR DEPRESSION IN COMPLETE REMISSION (H): ICD-10-CM

## 2018-10-17 DIAGNOSIS — F91.8 CONDUCT DISORDER, UNDIFFERENTIATED TYPE: Primary | ICD-10-CM

## 2018-10-17 ASSESSMENT — ANXIETY QUESTIONNAIRES
1. FEELING NERVOUS, ANXIOUS, OR ON EDGE: NOT AT ALL
2. NOT BEING ABLE TO STOP OR CONTROL WORRYING: NOT AT ALL
GAD7 TOTAL SCORE: 0
3. WORRYING TOO MUCH ABOUT DIFFERENT THINGS: NOT AT ALL
7. FEELING AFRAID AS IF SOMETHING AWFUL MIGHT HAPPEN: NOT AT ALL
5. BEING SO RESTLESS THAT IT IS HARD TO SIT STILL: NOT AT ALL
6. BECOMING EASILY ANNOYED OR IRRITABLE: NOT AT ALL

## 2018-10-17 ASSESSMENT — PATIENT HEALTH QUESTIONNAIRE - PHQ9: 5. POOR APPETITE OR OVEREATING: NOT AT ALL

## 2018-10-17 NOTE — MR AVS SNAPSHOT
After Visit Summary   10/17/2018    Javi Salmeron    MRN: [id number]           Patient Information     Date Of Birth          1970        Visit Information        Provider Department      10/17/2018 4:00 PM Thomas Baugh, PhD LP [facility]         Follow-ups after your visit        Your next 10 appointments already scheduled     Nov 05, 2018  4:30 PM CST   GROUP with [facility] AC 2   [facility] ([facility])    [address]  [address]  [facility] [address]   [phone]            Dec 03, 2018  4:30 PM CST   GROUP with SE [facility] CSB AC 2   [facility] ([facility])    [address]  [address]  [facility] [address]   [phone]            Dec 12, 2018  4:00 PM CST   Individual Therapy 53+ minutes with Thomas Baugh, PhD TYLER   [facility] ([facility])    [address]  [address]  [facility] [address]   [phone]            Jan 07, 2019  4:30 PM CST   GROUP with SE [facility] CSB AC 2   [facility] ([facility])    [address]  [address]  [facility] [address]   [phone]            Feb 04, 2019  4:30 PM CST   GROUP with SE [facility] CSB AC 2   [facility] ([facility])    [address]  [address]  [facility] [address]   [phone]            Mar 04, 2019  4:30 PM CST   GROUP with SE [facility] CSB AC 2   [facility] ([facility])    [address]  [address]  [facility] [address]   [phone]              Who to contact     Please call your clinic at [phone] to:    Ask questions about your health    Make or cancel appointments    Discuss your medicines    Learn about your test results    Speak to your doctor            Additional Information About Your Visit        MyChart Information     [employer] is an electronic gateway that provides easy, online access to  your medical records. With [employer], you can request a clinic appointment, read your test results, renew a prescription or communicate with your care team.     To sign up for [employer] visit the website at [email or web address]   You will be asked to enter the access code listed below, as well as some personal information. Please follow the directions to create your username and password.     Your access code is: [email or web address]  Expires: 10/20/2018  9:26 AM     Your access code will  in 90 days. If you need help or a new code, please contact your [facility] or call [phone] for assistance.        Care EveryWhere ID     This is your Care EveryWhere ID. This could be used by other organizations to access your [facility] medical records  [id number]         Blood Pressure from Last 3 Encounters:   No data found for BP    Weight from Last 3 Encounters:   No data found for Wt              Today, you had the following     No orders found for display       Primary Care Provider Office Phone # Fax #    Mt Becker [phone]       [address]  [place] [address]        Equal Access to Services     HELENA PALMER : Hadii [place] connollyo Sosundar, waaxda luqadaha, qaybta kaalmada adeegyada, ying lainez. So [facility] [phone].    ATENCIÓN: Si habla español, tiene a horton disposición servicios gratuitos de asistencia lingüística. Llame al [phone].    We comply with applicable federal civil rights laws and [place] laws. We do not discriminate on the basis of race, color, national origin, age, disability, sex, sexual orientation, or gender identity.            Thank you!     Thank you for choosing [facility]  for your care. Our goal is always to provide you with excellent care. Hearing back from our patients is one way we can continue to improve our services. Please take a few minutes to complete  the written survey that you may receive in the mail after your visit with us. Thank you!             Your Updated Medication List - Protect others around you: Learn how to safely use, store and throw away your medicines at www.disposemymeds.org.          This list is accurate as of 10/17/18 11:59 PM.  Always use your most recent med list.                   Brand Name Dispense Instructions for use Diagnosis    Cervical Traction Kit     1 kit    Use at home as needed and guided by Physical therapy    Cervicalgia       citalopram 40 MG tablet    celeXA    30 tablet    Take 1 tablet (40 mg) by mouth every morning    Depression       naltrexone 50 MG tablet    DEPADE;REVIA    90 tablet    Take 1 tablet (50 mg) by mouth every morning    Unspecified psychosexual disorder       omeprazole 20 MG CR capsule    priLOSEC    30 capsule    Take 1 capsule (20 mg) by mouth daily    Dyspeptic diarrhea

## 2018-10-21 PROBLEM — F32.5 MAJOR DEPRESSION IN COMPLETE REMISSION (H): Status: ACTIVE | Noted: 2018-10-21

## 2018-10-21 PROBLEM — F91.8 CONDUCT DISORDER, UNDIFFERENTIATED TYPE: Status: ACTIVE | Noted: 2018-10-21

## 2018-10-21 NOTE — PROGRESS NOTES
[facility]      [address] [place] [address] [address]  [place] [address]                                                                                Phone: [phone]                                                                                  Fax: [phone]                                                                    [email or web address]    ANNUAL UPDATE: Diagnostic Assessment Interview    Date of Service: 10/17/18  Client Name: Javi Salmeron  YOB: 1970  48 year old  MRN:  [id number]  Gender/Gender Identity: Male/Man  Treating Provider: Connie Baugh, PhD, LP  Program:  CSB   Type of Session: Assessment  Present in Session: Patient  Number of Minutes:  55     Updated Presenting Problem and Goals:  The client is distressed over a history of compulsive sexual behavior including thoughts to act out sexually, history of extra-marital relationships, compulsive use of pornography, risk situations, and boundary violations.     Some risk situations but had no boundary violations. Using postive coping mechanisms.    Major Depressive symptoms currently in full remission.      Dythymic feelings much improved.       Updated Mental Health History:   ***    Updated Substance Use:   ***    Updated Medical History:   ***    Updated Contraception Use:  ***    Updated Family History:   ***    Updated Current Significant Relationship/Partner:  ***    Concurrent/extramarital relationships:  ***    Updated Educational History:  ***    Updated Occupational History:  ***    Updated Legal Issues:  ***  ________________________________________________________________  CONCLUSIONS    Updated Strengths and Liabilities:   ***    Updated Symptoms:  ***  See updated PHQ-9, RAGINI-7, CAGE, and Safety Screening    Updated Mental Status:      Mental Status:   Appearance:  {APPEARANCE:144219}  Behavior/relationship to examiner/demeanor:  {:187519}  Orientation: Oriented to  {Orientation:531991535}  Speech Rate:  {:218058}  Speech Spontaneity:  {:603570}  Mood:  {MOOD (SUBJECTIVE REPORT):861461}  Affect:  {:310478}  Thought Process (Associations):  {THOUGHT PROCESS (ASSOCIATIONS):584260}  Thought Content:  {THOUGHT CONTENT:346083}  Abnormal Perception:  {:940685}  Attention/Concentration:  {:966989}  Language:  {:473147}  Insight:  {INSIGHT:176234}  Judgment:  {JUDGMENT:298428}    {MENTAL STATUS OTHER:769033739}      Interactive Complexity:  Communication difficulties present during the current psychiatric procedure included:  1. The need to manage maladaptive communication (e.g. related to high anxiety, high reactivity, repeated questions, or disagreement, etc.) among participants that complicated delivery of care.   2. Caregiver emotion/behavior that interfered with implementation of the treatment plan.  3. Evidence/disclosure of a sentinel event and mandated report to a third party (e.g. abuse or neglect with report to state agency, etc.) with initiation of discussion of the sentinel event and/or report with patient and other visit participants.   4. Use of play equipment, physical devices,  or  to overcome significant communication barriers.   5. 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 Based on the client's current report of symptoms, client continues to meet criteria for ***. The client's mental health concerns continue to affect client's ability to function *** and have been causing clinically significant distress. The client reports *** drug/alcohol use frequency and duration. The patient is also struggling with *** insert other high  risk/problematic psychosocial stressors. Client reports/denies [safety concerns]. Based on the client's reported symptoms and impact on functioning, the plan for the patient is:  1. Continue supportive individual/family therapy with a provider specialized in ***. Therapy should focus on ***.  2. Consider family therapy to address ***.  3. Continue care/Establish care with a psychiatrist for medication management.   4. Consider ways of increasing client's social support through ***.  5. Continue to assess the impact of client's family and relational patterns on their current well-being.   6. Consider participation in *** group therapy in order to provide further ***.  7. Coordinate care as needed with medical providers.   8. Psychological testing to further assess dx, psychological functioning, and treatment direction. Consider administering the following psychological tests ***.      Signature/Title    [Therapist to Insert personalized signature smart link/ dot phrase]  ***

## 2018-10-21 NOTE — PROGRESS NOTES
[facility]      [address]  [place] [address]                                                                                Phone: [phone]                                                                                  Fax: [phone]                                                                    [email or web address]    ANNUAL UPDATE: Diagnostic Assessment Interview    Date of Service: 10/17/18  Client Name: Javi Salmeron  YOB: 1970  48 year old  MRN:  [id number]  Gender/Gender Identity: Male/Man  Treating Provider: Connie Baugh, PhD, LP  Program:  CSB   Type of Session: Assessment  Present in Session: Patient  Number of Minutes:  55     Updated Presenting Problem and Goals:  The client is distressed over a history of compulsive sexual behavior including thoughts to act out sexually, history of extra-marital relationships, compulsive use of pornography, risk situations, and boundary violations.     Some risk situations but had no boundary violations. Using postive coping mechanisms.    Major Depressive symptoms currently in full remission.      Dythymic feelings much improved.       Updated Mental Health History:   Patient mental health is stabilized.  Major depression is in complete remission.  Dymsthyia stable.  CSB under control.    Updated Substance Use:   Minimal use of alcohol  Denies abuse.    Updated Medical History:   Has high blood pressure.  High cholesterol and triglycerides.  Doctor wants him to control this through diet and exercise.    Has cerebral palsy.  Under control.    Has pain in neck and back.  Controlled through physical therapy.    Updated Family History:   Has good relationship with ex-wife.  Has much improved relationship with son who was very oppositional.  Good relationship with his other kids.  Some strain with mother who does not acknowledge history of his being born - real father.  Connecting with his  "biological father.  Feeling good about that.      Updated Current Significant Relationship/Partner:  Has a serious dating relationship.  Not ready for marriage but is moving towards that.  Blending their families which seems to be going well.    Satisfied with their sexual relationship.         Updated Educational History:  N/A    Updated Occupational History:  Frustrated with his job.  Stressful but is doing very well. Wonders about making some job transfer.    Updated Legal Issues:  None    CONCLUSIONS    Updated Strengths and Liabilities:   Javi is bright, motivated for treatment.  Has some self esteem issues and challenges with his parenting one of his children.  Otherwise is doing very well.    Updated Symptoms:  See updated PHQ-9, RAGINI-7, CAGE, and Safety Screening    Updated Mental Status:      Mental Status:   Appearance:  No apparent distress, Casually dressed and Well groomed  Behavior/relationship to examiner/demeanor:  Cooperative, Engaged and Pleasant  Orientation: Oriented to person, place, time and situation  Speech Rate:  Normal  Speech Spontaneity:  Normal  Mood:  \"good\", \"fine\" and \"okay\"  Affect:  Appropriate/mood-congruent  Thought Process (Associations):  Logical, Linear and Goal directed  Thought Content:  no evidence of suicidal or homicidal ideation  Abnormal Perception:  None  Attention/Concentration:  Normal  Language:  Intact  Insight:  Good  Judgment:  Good          Interactive Complexity  N/A    Updated DSM-5 Diagnoses:  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 Based on the client's current report of symptoms, client continues to meet criteria for hypersexual and dysthymic disorder. The " client's mental health concerns continue to affect client's ability to function in interpersonal relationships and have been causing clinically significant distress. The client reports no drug/alcohol abuse.   Client /denies safety concerns. Based on the client's reported symptoms   the plan for the patient is:  1. Continue supportive individual/family therapy     2. Continue care with a psychiatrist for medication management.   3. Continue to assess the impact of client's family and relational patterns on their current well-being.   4. Continue aftercare group.  5. Coordinate care as needed with medical providers.        Connie Baugh, PhD,LP

## 2018-10-23 ASSESSMENT — PATIENT HEALTH QUESTIONNAIRE - PHQ9: SUM OF ALL RESPONSES TO PHQ QUESTIONS 1-9: 2

## 2018-10-23 ASSESSMENT — ANXIETY QUESTIONNAIRES: GAD7 TOTAL SCORE: 0

## 2018-11-02 ENCOUNTER — HOSPITAL ENCOUNTER (OUTPATIENT)
Facility: AMBULATORY SURGERY CENTER | Age: 48
End: 2018-11-02
Attending: INTERNAL MEDICINE
Payer: COMMERCIAL

## 2018-11-12 ENCOUNTER — OFFICE VISIT (OUTPATIENT)
Dept: OTHER | Facility: OUTPATIENT CENTER | Age: 48
End: 2018-11-12

## 2018-11-12 DIAGNOSIS — Z51.89 AFTERCARE: Primary | ICD-10-CM

## 2018-11-12 NOTE — MR AVS SNAPSHOT
After Visit Summary   11/12/2018    Javi Salmeron    MRN: [id number]           Patient Information     Date Of Birth          1970        Visit Information        Provider Department      11/12/2018 4:30 PM [facility]        Today's Diagnoses     Aftercare    -  1       Follow-ups after your visit        Your next 10 appointments already scheduled     Nov 20, 2018  1:00 PM CST   Colonoscopy with Gary Baugh MD   [facility] ([facility])    [address]  [address]  [facility] [address]   [phone]            Dec 03, 2018  4:30 PM CST   GROUP with [facility] CSB AC 2   [facility] ([facility]    [address]  [address]  [facility] [address]   [phone]            Dec 12, 2018  4:00 PM CST   Individual Therapy 53+ minutes with Thomas Baugh, PhD    [facility] ([facility])    [address]  [address]  [facility] [address]   [phone]            Jan 07, 2019  4:30 PM CST   GROUP with [facility] CSB AC 2   [facility]    [address]  [address]  [facility] [address]   [phone]            Feb 04, 2019  4:30 PM CST   GROUP with [facility] CSB AC 2   [facility]    [address]  [address]  [facility] [address]   [phone]            Mar 04, 2019  4:30 PM CST   GROUP with [facility] CSB AC 2   [facility] ([facility])    [address]  [address]  [facility] [address]   [phone]              Who to contact     Please call your clinic at [phone] to:    Ask questions about your health    Make or cancel appointments    Discuss your medicines    Learn about your test results    Speak to your doctor            Additional Information About Your Visit        MyChart Information     H?RELhart is an electronic  gateway that provides easy, online access to your medical records. With Kick Sport, you can request a clinic appointment, read your test results, renew a prescription or communicate with your care team.     To sign up for Kick Sport visit the website at [email or web address]   You will be asked to enter the access code listed below, as well as some personal information. Please follow the directions to create your username and password.     Your access code is: [email or web address]  Expires: 2/10/2019  6:20 PM     Your access code will  in 90 days. If you need help or a new code, please contact your [facility] or call [phone] for assistance.        Care EveryWhere ID     This is your Care EveryWhere ID. This could be used by other organizations to access your [place] medical records  [id number]         Blood Pressure from Last 3 Encounters:   10/10/18 (!) 146/100   10/17/17 (!) 144/97   16 134/88    Weight from Last 3 Encounters:   10/10/18 95.3 kg (210 lb)   10/27/17 90 kg (198 lb 8 oz)   10/17/17 92.1 kg (203 lb)              We Performed the Following     Support Group - 120 Min. [[id number]]        Primary Care Provider Office Phone # Fax #    Mt Becker [phone]        [facility]  [facility] [address]        Equal Access to Services     ANSHUL PALMER : Hadii [place] cat hadasho Sostephaneali, waaxda luqadaha, qaybta kaalmada jose, ying carmona . So [facility] [phone].    ATENCIÓN: Si masonla chantell, tiene a horton disposición servicios gratuitos de asistencia lingüística. Llame al [phone].    We comply with applicable federal civil rights laws and [place] laws. We do not discriminate on the basis of race, color, national origin, age, disability, sex, sexual orientation, or gender identity.            Thank you!     Thank you for choosing [facility]  for your care. Our goal is always to  provide you with excellent care. Hearing back from our patients is one way we can continue to improve our services. Please take a few minutes to complete the written survey that you may receive in the mail after your visit with us. Thank you!             Your Updated Medication List - Protect others around you: Learn how to safely use, store and throw away your medicines at www.disposemymeds.org.          This list is accurate as of 11/12/18  6:20 PM.  Always use your most recent med list.                   Brand Name Dispense Instructions for use Diagnosis    Cervical Traction Kit     1 kit    Use at home as needed and guided by Physical therapy    Cervicalgia       citalopram 40 MG tablet    celeXA    30 tablet    Take 1 tablet (40 mg) by mouth every morning    Depression       naltrexone 50 MG tablet    DEPADE;REVIA    90 tablet    Take 1 tablet (50 mg) by mouth every morning    Unspecified psychosexual disorder       omeprazole 20 MG CR capsule    priLOSEC    30 capsule    Take 1 capsule (20 mg) by mouth daily    Dyspeptic diarrhea

## 2018-11-13 ENCOUNTER — TELEPHONE (OUTPATIENT)
Dept: GASTROENTEROLOGY | Facility: OUTPATIENT CENTER | Age: 48
End: 2018-11-13

## 2018-11-13 NOTE — PROGRESS NOTES
[facility]  Case Progress Note    11/12/18    Client Name: Javi Salmeron  YOB: 1970  MRN:  [id number]  Treating Provider: Connie Baugh, PhD  Type of Session: Aftercare  Number of Minutes: 120    Current Symptoms/Status:  Feeling good about his progress.    Progress Toward Treatment Goals:   Using support system.       Intervention: Modality and Description:  Aftercare support group.    Response to Intervention:  Stress again about son - who has been destructive of property at home.  Found out that son has been smoking marijuana.  Vaping as well.  Trying to talk to ex-wife but they don't seem to agree about parenting.  Has been triggering.  Even felt suicidal - deep into his negative core fuels.  No longer feeling suicidal.      Assignment:  Stay within boundaries.       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

## 2018-11-15 ENCOUNTER — TELEPHONE (OUTPATIENT)
Dept: GASTROENTEROLOGY | Facility: OUTPATIENT CENTER | Age: 48
End: 2018-11-15

## 2018-11-15 DIAGNOSIS — Z12.11 ENCOUNTER FOR SCREENING COLONOSCOPY: Primary | ICD-10-CM

## 2018-11-15 NOTE — TELEPHONE ENCOUNTER
Patient taking any blood thinners ? No     Heart disease ? Denies     Lung disease ? Denies       Sleep apnea ? Denies     Diabetic ? Denies     Kidney disease ? Denies     Dialysis ? N/a     Electronic implanted medical devices ? Denies     Are you taking any narcotic pain medication ?  No. Naltrexone What is your daily dosage ?    PTSD ? N/a     Prep instructions reviewed with patient ? Instructions, policy,MAC sedation plan reviewed. Advised patient to have someone stay with them post exam.    Pharmacy : [facility]    Indication for procedure : Preventative health care [Z00.00    Referring provider :Mt Becker MD     Arrival Time : 12 PM

## 2018-11-19 DIAGNOSIS — K52.9 DYSPEPTIC DIARRHEA: ICD-10-CM

## 2018-11-20 ENCOUNTER — TRANSFERRED RECORDS (OUTPATIENT)
Dept: HEALTH INFORMATION MANAGEMENT | Facility: CLINIC | Age: 48
End: 2018-11-20

## 2018-11-20 ENCOUNTER — DOCUMENTATION ONLY (OUTPATIENT)
Dept: GASTROENTEROLOGY | Facility: OUTPATIENT CENTER | Age: 48
End: 2018-11-20
Payer: COMMERCIAL

## 2018-11-23 LAB — COPATH REPORT: NORMAL

## 2018-12-03 ENCOUNTER — OFFICE VISIT (OUTPATIENT)
Dept: OTHER | Facility: OUTPATIENT CENTER | Age: 48
End: 2018-12-03

## 2018-12-03 DIAGNOSIS — Z51.89 AFTERCARE: Primary | ICD-10-CM

## 2018-12-03 NOTE — MR AVS SNAPSHOT
After Visit Summary   12/3/2018    Javi Salmeron    MRN: [id number]           Patient Information     Date Of Birth          1970        Visit Information        Provider Department      12/3/2018 4:30 PM [facility]        Today's Diagnoses     Aftercare    -  1       Follow-ups after your visit        Your next 10 appointments already scheduled     Dec 12, 2018  4:00 PM CST   Individual Therapy 53+ minutes with Thomas Baugh, PhD TYLER   [facility] ([facility])    [address]  [address]  [facility] [address]   [phone]            Jan 07, 2019  4:30 PM CST   GROUP with [facility] CSB AC 2   [facility] ([facility])    [address]  [address]  [facility] [address]   [phone]            Feb 04, 2019  4:30 PM CST   GROUP with [facility] CSB AC 2   [facility] ([facility])    [address]  [address]  [facility] [address]   [phone]            Feb 06, 2019  4:00 PM CST   Individual Therapy 53+ minutes with Thomas Baugh, PhD    [facility] ([facility])    [address]  [address]  [facility] [address]   [phone]            Mar 04, 2019  4:30 PM CST   GROUP with [facility] CSB AC 2   [facility] ([facility])    [address]  [address]  [facility] [address]   [phone]              Who to contact     Please call your clinic at [phone] to:    Ask questions about your health    Make or cancel appointments    Discuss your medicines    Learn about your test results    Speak to your doctor            Additional Information About Your Visit        Precyse Technologieshart Information     Live Matrix is an electronic gateway that provides easy, online access to your medical records. With Live Matrix, you can request a clinic appointment, read your test results, renew a prescription or  communicate with your care team.     To sign up for Project Repathart visit the website at [email or web address]   You will be asked to enter the access code listed below, as well as some personal information. Please follow the directions to create your username and password.     Your access code is: [email or web address]  Expires: 2/10/2019  6:20 PM     Your access code will  in 90 days. If you need help or a new code, please contact your [facility] or call [phone] for assistance.        Care EveryWhere ID     This is your Care EveryWhere ID. This could be used by other organizations to access your [place] medical records  [id number]         Blood Pressure from Last 3 Encounters:   10/10/18 (!) 146/100   10/17/17 (!) 144/97   16 134/88    Weight from Last 3 Encounters:   10/10/18 95.3 kg (210 lb)   10/27/17 90 kg (198 lb 8 oz)   10/17/17 92.1 kg (203 lb)              We Performed the Following     Support Group - 120 Min. [[phone]]        Primary Care Provider Office Phone # Fax #    Mt Becker [phone]       [address]  [facility] [address]        Equal Access to Services     ANSHUL PALMER : Hadii [place] cat hadasho Soomaali, waaxda luqadaha, qaybta kaalmada aderachel, ying lainez. So [facility] [phone].    ATENCIÓN: Si habla español, tiene a horton disposición servicios gratuitos de asistencia lingüística. Llame al [phone].    We comply with applicable federal civil rights laws and [place] laws. We do not discriminate on the basis of race, color, national origin, age, disability, sex, sexual orientation, or gender identity.            Thank you!     Thank you for choosing [facility]  for your care. Our goal is always to provide you with excellent care. Hearing back from our patients is one way we can continue to improve our services. Please take a few minutes to complete the written  survey that you may receive in the mail after your visit with us. Thank you!             Your Updated Medication List - Protect others around you: Learn how to safely use, store and throw away your medicines at www.disposemymeds.org.          This list is accurate as of 12/3/18  6:29 PM.  Always use your most recent med list.                   Brand Name Dispense Instructions for use Diagnosis    Cervical Traction Kit     1 kit    Use at home as needed and guided by Physical therapy    Cervicalgia       citalopram 40 MG tablet    celeXA    30 tablet    Take 1 tablet (40 mg) by mouth every morning    Depression       naltrexone 50 MG tablet    DEPADE/REVIA    90 tablet    Take 1 tablet (50 mg) by mouth every morning    Unspecified psychosexual disorder       omeprazole 20 MG DR capsule    priLOSEC    90 capsule    TAKE 1 CAPSULE(20 MG) BY MOUTH DAILY    Dyspeptic diarrhea

## 2018-12-03 NOTE — PROGRESS NOTES
[facility]  Case Progress Note    12/03/18    Client Name: Javi Salmeron  YOB: 1970  MRN:  [id number]  Treating Provider: Connie Baugh, PhD  Type of Session: Aftercare  Number of Minutes: 120    Current Symptoms/Status:  Feeling good about his progress.    Progress Toward Treatment Goals:   Using support system.       Intervention: Modality and Description:  Aftercare support group.    Response to Intervention:  Did not take time but was an active participant.    Assignment:  Stay within boundaries.       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

## 2018-12-15 DIAGNOSIS — K52.9 DYSPEPTIC DIARRHEA: ICD-10-CM

## 2018-12-17 ENCOUNTER — OFFICE VISIT (OUTPATIENT)
Dept: OTHER | Facility: OUTPATIENT CENTER | Age: 48
End: 2018-12-17
Payer: COMMERCIAL

## 2018-12-17 DIAGNOSIS — F34.1 DYSTHYMIC DISORDER: ICD-10-CM

## 2018-12-17 DIAGNOSIS — F91.8 CONDUCT DISORDER, UNDIFFERENTIATED TYPE: Primary | ICD-10-CM

## 2018-12-17 NOTE — PROGRESS NOTES
[facility]  Case Progress Note    12/17/18    Client Name: Javi Salmeron  YOB: 1970  MRN:  [id number]  Treating Provider: Connie Baugh, PhD  Type of Session: Individual  Number of Minutes: 55    Current Symptoms/Status:  Staying within boundaries.  Depression stable.  Feeling much better about family dynamics.       Progress Toward Treatment Goals:   Using support system.    Feeling more calm.    Intervention: Modality and Description:  Individual therapy, cbt, interpersonal.    Response to Intervention:  Dealing with issues with bio father.  Plans to see him.  Feeling good about this however.  Keeping his expectations in check.  Talked about intimacy in relationship.  Not ready for marriage - but explored whether this was out of fear or what.    Feels that CSB is in check - using support system.    Assignment:  Stay within boundaries.       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  Continue aftercare group.

## 2018-12-17 NOTE — TELEPHONE ENCOUNTER
Last seen 10/10/18    Prescription approved per [facility] Refill Protocol. Rosita  Had already sent a 90 day supply on 11/19/18 with 1 refill but they never got it.    Will resend.      Maryellen Montiel RN  December 17, 2018 4:04 PM

## 2019-01-07 ENCOUNTER — OFFICE VISIT (OUTPATIENT)
Dept: OTHER | Facility: OUTPATIENT CENTER | Age: 49
End: 2019-01-07

## 2019-01-07 DIAGNOSIS — Z51.89 AFTERCARE: Primary | ICD-10-CM

## 2019-01-07 NOTE — PROGRESS NOTES
[facility]  Case Progress Note    1/07/19    Client Name: Javi Salmeron  YOB: 1970  MRN:  [id number]  Treating Provider: Connie Baugh, PhD  Type of Session: Aftercare  Number of Minutes: 120    Current Symptoms/Status:  Feeling good about his progress.    Progress Toward Treatment Goals:   Using support system.       Intervention: Modality and Description:  Aftercare support group.    Response to Intervention:  Planning on seeing bio-dad in February.  Feeling good about things related to family, relationships.  Stress about work - given controversies the company is going through.  Did not take time but was an active participant.    Assignment:  Stay within boundaries.       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

## 2019-02-04 ENCOUNTER — OFFICE VISIT (OUTPATIENT)
Dept: OTHER | Facility: OUTPATIENT CENTER | Age: 49
End: 2019-02-04

## 2019-02-04 DIAGNOSIS — Z51.89 AFTERCARE: Primary | ICD-10-CM

## 2019-02-04 NOTE — PROGRESS NOTES
[facility]  Case Progress Note    2/04/19    Client Name: Javi Salmeron  YOB: 1970  MRN:  [id number]  Treating Provider: Connie Baugh, PhD  Type of Session: Aftercare  Number of Minutes: 120    Current Symptoms/Status:  Feeling good about his progress.    Progress Toward Treatment Goals:   Using support system.       Intervention: Modality and Description:  Aftercare support group.    Response to Intervention:  Processed situation with son who found out about his affair during marriage.  Received helpful feedback from group.  Feels that he can address this situation better.    Core fuels triggered but managing well.    Assignment:  Stay within boundaries.       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

## 2019-02-06 ENCOUNTER — OFFICE VISIT (OUTPATIENT)
Dept: OTHER | Facility: OUTPATIENT CENTER | Age: 49
End: 2019-02-06
Payer: COMMERCIAL

## 2019-02-06 DIAGNOSIS — F91.8 CONDUCT DISORDER, UNDIFFERENTIATED TYPE: Primary | ICD-10-CM

## 2019-02-06 DIAGNOSIS — F34.1 DYSTHYMIC DISORDER: ICD-10-CM

## 2019-02-07 NOTE — PROGRESS NOTES
[facility]  Case Progress Note    2/06/19    Client Name: Javi Salmeron  YOB: 1970  MRN:  [id number]  Treating Provider: Connie Baugh, PhD  Type of Session: Individual  Number of Minutes: 55    Current Symptoms/Status:  Feeling good about his progress.    Progress Toward Treatment Goals:   Using support system.       Intervention: Modality and Description:  Individual using interpersonal and CBT    Response to Intervention:  Getting ready to see bio-dad this month.  Some anxiety.  Dealing with great deal of stress related to one of his sons finding out about his affair during marriage.  Knows he needs to talk to him.  Not getting much support from wife.    Talked about strategies.  [place] motivated to address these issues.    Stress about work - given controversies the company is going through.       Assignment:  Stay within boundaries.       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  Monthly aftercare support group.

## 2019-02-13 DIAGNOSIS — K52.9 DYSPEPTIC DIARRHEA: ICD-10-CM

## 2019-02-14 NOTE — TELEPHONE ENCOUNTER
Refill request for omeprazole, patient already has refills. Placed a call to patient and he states that refill was a mistake, he does not need it and does not have other refill requests at this time.   Jia Christian RN  02/14/19  8:54 AM

## 2019-03-04 ENCOUNTER — OFFICE VISIT (OUTPATIENT)
Dept: OTHER | Facility: OUTPATIENT CENTER | Age: 49
End: 2019-03-04

## 2019-03-04 DIAGNOSIS — Z51.89 AFTERCARE: Primary | ICD-10-CM

## 2019-03-04 NOTE — PROGRESS NOTES
[facility]  Case Progress Note    3/04/19    Client Name: Javi Salmeron  YOB: 1970  MRN:  [id number]  Treating Provider: Connie Baugh, PhD  Type of Session: Aftercare  Number of Minutes: 120    Current Symptoms/Status:  Feeling good about his progress.    Progress Toward Treatment Goals:   Using support system.       Intervention: Modality and Description:  Aftercare support group.    Response to Intervention:  Had a great visit with bio dad.  Very gratifying experience.  Relationship continues to grow.      Assignment:  Stay within boundaries.       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

## 2019-03-08 ENCOUNTER — TELEPHONE (OUTPATIENT)
Dept: FAMILY MEDICINE | Facility: CLINIC | Age: 49
End: 2019-03-08

## 2019-03-08 DIAGNOSIS — E78.5 DYSLIPIDEMIA: Primary | ICD-10-CM

## 2019-03-08 NOTE — TELEPHONE ENCOUNTER
[facility] Call Center    Phone Message    May a detailed message be left on voicemail: yes    Reason for Call: Order(s): Other:   Reason for requested: Requesting order for cholesterol check. Please clal pt once order have been placed.   Date needed: Pt has an appt with Dr. Becker on 03/20/2019  Provider name: Dr. Becker      Action Taken: Message routed to:  [facility]: [facility]

## 2019-03-08 NOTE — TELEPHONE ENCOUNTER
Patient due for LIPID PANEL. Last labs 10/2018 and to follow up in 2 months following diet/exercise regime. Orders placed and patient notified.   Jia Christian RN  03/08/19  4:27 PM

## 2019-03-13 ENCOUNTER — OFFICE VISIT (OUTPATIENT)
Dept: OTHER | Facility: OUTPATIENT CENTER | Age: 49
End: 2019-03-13
Payer: COMMERCIAL

## 2019-03-13 DIAGNOSIS — F91.8 CONDUCT DISORDER, UNDIFFERENTIATED TYPE: Primary | ICD-10-CM

## 2019-03-13 DIAGNOSIS — F34.1 DYSTHYMIC DISORDER: ICD-10-CM

## 2019-03-14 DIAGNOSIS — E78.5 DYSLIPIDEMIA: ICD-10-CM

## 2019-03-14 LAB
CHOLEST SERPL-MCNC: 203 MG/DL (ref 0–200)
CHOLEST/HDLC SERPL: 5.1 {RATIO} (ref 0–5)
FASTING SPECIMEN: YES
HDLC SERPL-MCNC: 40 MG/DL
LDLC SERPL CALC-MCNC: 122 MG/DL (ref 0–129)
TRIGL SERPL-MCNC: 204 MG/DL (ref 0–150)
VLDL-CHOLESTEROL: 41 (ref 7–32)

## 2019-03-14 NOTE — PROGRESS NOTES
[facility]  Case Progress Note    3/13/19    Client Name: Javi Salmeron  YOB: 1970  MRN:  [id number]  Treating Provider: Connie Baugh, PhD  Type of Session: Individual  Number of Minutes: 55    Current Symptoms/Status:  Feeling good about his progress.    Progress Toward Treatment Goals:   Using support system.       Intervention: Modality and Description:  Individual using interpersonal and CBT    Response to Intervention:  Feeling very good about connection with bio-dad.  Countering his neg core fuel of not belonging.  Anxious about talking to son about infidelity in marriage but prepared to do so.    Stress about work but given large bonus.  Still looking at other options.    Assignment:  Stay within boundaries.       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  Monthly aftercare support group.

## 2019-03-20 ENCOUNTER — OFFICE VISIT (OUTPATIENT)
Dept: FAMILY MEDICINE | Facility: CLINIC | Age: 49
End: 2019-03-20
Payer: COMMERCIAL

## 2019-03-20 VITALS
HEART RATE: 75 BPM | BODY MASS INDEX: 27.32 KG/M2 | TEMPERATURE: 98 F | WEIGHT: 201.5 LBS | OXYGEN SATURATION: 97 % | SYSTOLIC BLOOD PRESSURE: 143 MMHG | DIASTOLIC BLOOD PRESSURE: 91 MMHG | RESPIRATION RATE: 16 BRPM

## 2019-03-20 DIAGNOSIS — R03.0 ELEVATED BLOOD PRESSURE READING WITHOUT DIAGNOSIS OF HYPERTENSION: Primary | ICD-10-CM

## 2019-03-20 NOTE — PROGRESS NOTES
"REASON FOR VISIT:  Follow up on weight loss and cholesterol      HISTORY OF PRESENT ILLNESS: Javi Salmeron is a 48 year old male who presents for above. He reports that he is doing well, and was motivated by our last conversation to make changes to his diet and exercise.     1. Weight  Javi established care with me for physical 5 months ago on 10/10/18. At that visit, we discussed his desire for weight loss, which was hindered by his cerebral palsy and neck pain. He had been exercising occasionally with his sons. He was also drinking 20-40oz of soda/day. I encourage him to follow the DASH diet, and increase his activity by walking with Nordic Walking poles or using a Step Master 2-3 times/week.    He has lost about 9 pounds by our scale, weighing 201 pounds today. He reports that he was down to 198 pounds at his lightest. He primarily decreased his portion size at meals. He also purchased Nordic Walking poles, but has not started using them yet. He has been using an \"ab roller\", but that is the only exercise he is doing regularly. We discussed today that he has always \"dreaded\" exercise, particularly due to his cerebral palsy.    2. Stress  At his last appointment, he reported he had taken a DNA test and learned that his father growing up was not his biological father. His biological father was a serviceman and met his mother in [place]. His family knew about this, but he only found out in the past year. He met his biological father about 2 weeks ago, and it went very well. He also met his step-mother, and half siblings. He is feeling much better about the situation than he had been.    3. Elevated BP  His BP was elevated at his last visit, which he attributed to white coat hypertension and stress. I recommended that he purchase a home BP machine, and record some readings. He is unable to access his results today, but recalls consistently elevated initial readings. If he waited and took a second reading, his BP would " improve. He has not been using the cuff as much recently. His BP was elevated again today, will recheck.    4. Hyperlipidemia  When we checked a lipid panel at his physical on 10/10/18, he had the following results:  Total cholesterol: 219  Triglycerides: 424  HDL: 30  LDL: unable to calculate    He had a repeat lipid panel about 1 week ago on 3/14/19, and his results had improved.  Total cholesterol: 203  Triglycerides: 204  HDL: 40  LDL: 122    However, he reports that he is slightly concerned about his elevated results. I discussed his 10 year risk with him, and that he does not necessarily require a statin at this time.    SOCIAL HISTORY:   Social History     Social History Narrative    Lives in [place].    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    .    3 sons, one 17 and 13 year old twins.           MEDICATIONS:  Reviewed.       REVIEW OF SYSTEMS: See HPI. Rest of Review is negative      OBJECTIVE:      EXAM:  VITAL SIGNS: BP (!) 143/91 (BP Location: Right arm, Patient Position: Sitting, Cuff Size: Adult Large)   Pulse 75   Temp 98  F (36.7  C) (Oral)   Resp 16   Wt 91.4 kg (201 lb 8 oz)   SpO2 97%   BMI 27.32 kg/m    Constitutional: healthy, alert and no distress   Cardiovascular: Regular rate and rhythm.  Respiratory: Clear to auscultation bilaterally.           LABORATORY DATA: Labs pending.      ASSESSMENT:   Javi is a 47 yo with borderline Hypertension. He has lost 9.5 lbs. The BP has come down but still not quite in the normal range. He is hoping to avoid medications.      PLAN:  Look again at the DASH diet.   Increase exercise  Aim for a weight of 190-195 lbs over the next 2-3 months  Take home BP measurements. If top number above 135 or bottom above 85, then we should re-discuss medications.   Will likely start with hydrochlorothiazide or ACE-inhibitor or Amlodipine.   Recommend starting a Magnesium 325mg supplement at bedtime    Follow up in 2-3 months      The 10-year ASCVD risk  score (Francoise ACOSTA Jr., et al., 2013) is: 5.4%*    Values used to calculate the score:      Age: 48 years      Sex: Male      Is Non- : No      Diabetic: No      Tobacco smoker: No      Systolic Blood Pressure: 143 mmHg      Is BP treated: No      HDL Cholesterol: 40 mg/dL*      Total Cholesterol: 203 mg/dL*      * - Cholesterol units were assumed for this score calculation    ADDENDUM: Javi was able to access his BP readings at the end of his visits, and we reviewed them. The readings range from 130-140/80-85.    Call with any problems or questions.     Over 50% of the 25 minute in room time spent reviewing assessment and plan       I, Mely Jose, am serving as a scribe to document services personally performed by Mt Becker MD based on data collection and the provider's statements to me. Dr. Becker has reviewed, edited and approved the above note.     --Mt Becker MD

## 2019-03-20 NOTE — PATIENT INSTRUCTIONS
Javi is a 47 yo with borderline Hypertension. He has lost 9.5 lbs. The BP has come down but still not quite in the normal range. He is hoping to avoid medications.     PLAN:  Look again at the DASH diet.   Increase exercise  Aim for a weight of 190-195 lbs over the next 2-3 months  Take home BP measurements. If top number above 135 or bottom above 85, then we should re-discuss medications.   Will likely start with hydrochlorothiazide or ACE-inhibitor or Amlodipine.     Take 325 mg of Magnesium at bedtime  Follow up in 2-3 months      The 10-year ASCVD risk score (Crestonsol ACOSTA Jr., et al., 2013) is: 5.4%*    Values used to calculate the score:      Age: 48 years      Sex: Male      Is Non- : No      Diabetic: No      Tobacco smoker: No      Systolic Blood Pressure: 143 mmHg      Is BP treated: No      HDL Cholesterol: 40 mg/dL*      Total Cholesterol: 203 mg/dL*      * - Cholesterol units were assumed for this score calculation

## 2019-03-20 NOTE — NURSING NOTE
48 year old  Chief Complaint   Patient presents with     Lipids     f/u on cholesterol, weight       Blood pressure (!) 143/91, pulse 75, temperature 98  F (36.7  C), temperature source Oral, resp. rate 16, weight 91.4 kg (201 lb 8 oz), SpO2 97 %. Body mass index is 27.32 kg/m .  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)      Major depression in complete remission (H)       Wt Readings from Last 2 Encounters:   03/20/19 91.4 kg (201 lb 8 oz)   10/10/18 95.3 kg (210 lb)     BP Readings from Last 3 Encounters:   03/20/19 (!) 143/91   10/10/18 (!) 146/100   10/17/17 (!) 144/97         Current Outpatient Medications   Medication     citalopram (CELEXA) 40 MG tablet     naltrexone (DEPADE;REVIA) 50 MG tablet     omeprazole (PRILOSEC) 20 MG DR capsule     Misc. Devices (CERVICAL TRACTION) KIT     No current facility-administered medications for this visit.        Social History     Tobacco Use     Smoking status: Never Smoker     Smokeless tobacco: Never Used   Substance Use Topics     Alcohol use: Yes     Comment: one or two a month     Drug use: No       Health Maintenance Due   Topic Date Due     PREVENTIVE CARE VISIT  1970     DEPRESSION ACTION PLAN  10/20/1986     ZOSTER IMMUNIZATION (1 of 2) 10/20/2018     PHQ-9 Q6 MONTHS  04/17/2019       No results found for: PAP      March 20, 2019 4:05 PM

## 2019-04-01 ENCOUNTER — OFFICE VISIT (OUTPATIENT)
Dept: OTHER | Facility: OUTPATIENT CENTER | Age: 49
End: 2019-04-01

## 2019-04-01 DIAGNOSIS — Z51.89 AFTERCARE: Primary | ICD-10-CM

## 2019-04-01 NOTE — PROGRESS NOTES
[facility]  Case Progress Note    4/01/19    Client Name: Javi Salmeron  YOB: 1970  MRN:  [id number]  Treating Provider: Connie Baugh, PhD  Type of Session: Aftercare  Number of Minutes: 120    Current Symptoms/Status:  Feeling good about his progress.    Progress Toward Treatment Goals:   Using support system.       Intervention: Modality and Description:  Aftercare support group    Response to Intervention:  Talked to one of his son's about the affair that he had that led to the dissolution of their relationship.  Had good conversations with his kids.  [place] profound pride of his sons.    Had some stress at work.  Decided to talk to manager about the issue rather than sitting on it.    Assignment:  Stay within boundaries.       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  Monthly aftercare support group.

## 2019-04-17 ENCOUNTER — OFFICE VISIT (OUTPATIENT)
Dept: OTHER | Facility: OUTPATIENT CENTER | Age: 49
End: 2019-04-17
Payer: COMMERCIAL

## 2019-04-17 DIAGNOSIS — F91.8 CONDUCT DISORDER, UNDIFFERENTIATED TYPE: Primary | ICD-10-CM

## 2019-04-17 DIAGNOSIS — F34.1 DYSTHYMIC DISORDER: ICD-10-CM

## 2019-04-18 NOTE — PROGRESS NOTES
[facility]  Case Progress Note    4/17/19    Client Name: Javi Salmeron  YOB: 1970  MRN:  [id number]  Treating Provider: Connie Buagh, PhD  Type of Session: Individual  Number of Minutes: 55    Current Symptoms/Status:  Feeling good about his progress.    Progress Toward Treatment Goals:   Using support system.       Intervention: Modality and Description:  Individual using interpersonal and CBT    Response to Intervention:  Feeling good about connections to kids.  Dealing with conflict with co-worker that he has been avoiding for 5 years or more.  Encouraged him to address this.  How to do this in an authentic manner.  Issues with his family and his girlfiend.  Stood up to them. Felt good about that.    Assignment:  Stay within boundaries.       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  Monthly aftercare support group.

## 2019-05-13 ENCOUNTER — OFFICE VISIT (OUTPATIENT)
Dept: OTHER | Facility: OUTPATIENT CENTER | Age: 49
End: 2019-05-13

## 2019-05-13 DIAGNOSIS — Z51.89 AFTERCARE: Primary | ICD-10-CM

## 2019-05-15 ENCOUNTER — OFFICE VISIT (OUTPATIENT)
Dept: OTHER | Facility: OUTPATIENT CENTER | Age: 49
End: 2019-05-15
Payer: COMMERCIAL

## 2019-05-15 DIAGNOSIS — F34.1 DYSTHYMIC DISORDER: ICD-10-CM

## 2019-05-15 DIAGNOSIS — F91.8 CONDUCT DISORDER, UNDIFFERENTIATED TYPE: Primary | ICD-10-CM

## 2019-05-21 NOTE — PROGRESS NOTES
[facility]  Case Progress Note    5/15/19    Client Name: Javi Salmeron  YOB: 1970  MRN:  [id number]  Treating Provider: Connie Baugh, PhD  Type of Session: Individual  Number of Minutes: 55    Current Symptoms/Status:  Feeling good about his progress.    Progress Toward Treatment Goals:   Using support system.       Intervention: Modality and Description:  Individual using interpersonal and CBT    Response to Intervention:  Addressed conflict with co-worker.  Addressed conflict with ex-wife.  Feeling better - understands that these issues are part of his CSB cycle and impression managemenet, avoidance.  Now needs to address issues with girlfriend.  Feeling very good about his connection to a new Sabianism he has found.    Assignment:  Stay within boundaries.       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  Monthly aftercare support group.

## 2019-06-03 ENCOUNTER — OFFICE VISIT (OUTPATIENT)
Dept: OTHER | Facility: OUTPATIENT CENTER | Age: 49
End: 2019-06-03

## 2019-06-03 DIAGNOSIS — Z51.89 AFTERCARE: Primary | ICD-10-CM

## 2019-06-03 NOTE — PROGRESS NOTES
[facility]  Case Progress Note    6/03/19    Client Name: Javi Salmeron  YOB: 1970  MRN:  [id number]  Treating Provider: Connie Baugh, PhD  Type of Session: Aftercare  Number of Minutes: 120    Current Symptoms/Status:  Feeling good about his progress.    Progress Toward Treatment Goals:   Using support system.       Intervention: Modality and Description:  Aftercare support group    Response to Intervention:  Been dealing with frustration at work - has led him to be more compulsive with porn.  Feels he has been staying within boundaries but his reasons for engaging in porn in a compulsive pattern.  Feels that he needs to stop looking at porn.    Assignment:  Stay within boundaries.       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  Monthly aftercare support group.

## 2019-06-12 ENCOUNTER — OFFICE VISIT (OUTPATIENT)
Dept: OTHER | Facility: OUTPATIENT CENTER | Age: 49
End: 2019-06-12
Payer: COMMERCIAL

## 2019-06-12 DIAGNOSIS — F91.8 CONDUCT DISORDER, UNDIFFERENTIATED TYPE: Primary | ICD-10-CM

## 2019-06-13 NOTE — PROGRESS NOTES
[facility]  Case Progress Note    6/12/19    Client Name: Javi Salmeron  YOB: 1970  MRN:  [id number]  Treating Provider: Connie Baugh, PhD  Type of Session: Individual  Number of Minutes: 45    Current Symptoms/Status:  Feeling good about his progress.    Progress Toward Treatment Goals:   Using support system.       Intervention: Modality and Description:  Individual using interpersonal and CBT    Response to Intervention:  Addressed conflict with co-worker.  Relationship has improved as a result.  Addressed conflict with ex-wife.  Told her his real feelings and fears.  And she was appreciative of that.  Again realizes the importanc of being genuine, authentic and that it pays off.  Feels more able to commit to her.    Assignment:  Stay within boundaries.       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  Monthly aftercare support group.

## 2019-07-17 ENCOUNTER — OFFICE VISIT (OUTPATIENT)
Dept: OTHER | Facility: OUTPATIENT CENTER | Age: 49
End: 2019-07-17
Payer: COMMERCIAL

## 2019-07-17 DIAGNOSIS — F91.8 CONDUCT DISORDER, UNDIFFERENTIATED TYPE: Primary | ICD-10-CM

## 2019-07-17 DIAGNOSIS — F32.5 MAJOR DEPRESSION IN COMPLETE REMISSION (H): ICD-10-CM

## 2019-07-18 NOTE — PROGRESS NOTES
[facility]  Case Progress Note    7/17/19    Client Name: Javi Salmeron  YOB: 1970  MRN:  [id number]  Treating Provider: Connie Baugh, PhD  Type of Session: Individual   Number of Minutes: 55    Current Symptoms/Status:  Feeling good about his progress.    Progress Toward Treatment Goals:   Using support system.       Intervention: Modality and Description:  Individual therapy using CBT, interpersonal.    Response to Intervention:  Processed dealing with bio dad and his family dynamics.  Feeling very good about how he is handling this and results.  Lowering negative core fuels.    Assignment:  Stay within boundaries.       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  Monthly aftercare support group.

## 2019-08-05 ENCOUNTER — OFFICE VISIT (OUTPATIENT)
Dept: OTHER | Facility: OUTPATIENT CENTER | Age: 49
End: 2019-08-05

## 2019-08-05 DIAGNOSIS — Z51.89 AFTERCARE: Primary | ICD-10-CM

## 2019-08-05 NOTE — PROGRESS NOTES
[facility]  Case Progress Note    8/05/19    Client Name: Javi Salmeron  YOB: 1970  MRN:  [id number]  Treating Provider: Connie Baugh, PhD  Type of Session: Aftercare  Number of Minutes: 120    Current Symptoms/Status:  Feeling good about his progress.    Progress Toward Treatment Goals:   Using support system.       Intervention: Modality and Description:  Aftercare support group    Response to Intervention:  Feeling good about family interactions.  Feeling good about how he handled the situation.    Assignment:  Stay within boundaries.       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  Monthly aftercare support group.

## 2019-08-23 DIAGNOSIS — K52.9 DYSPEPTIC DIARRHEA: ICD-10-CM

## 2019-08-23 NOTE — TELEPHONE ENCOUNTER
Last time prescribed: 12/17/18 , 90 caps x 1 refills  Last office visit: 3/20/19  Next appointment: No future appointments    Prescription approved per [facility] Refill Protocol.  Sidra Valdez RN  [facility]

## 2019-09-09 ENCOUNTER — OFFICE VISIT (OUTPATIENT)
Dept: OTHER | Facility: OUTPATIENT CENTER | Age: 49
End: 2019-09-09

## 2019-09-09 DIAGNOSIS — Z51.89 AFTERCARE: Primary | ICD-10-CM

## 2019-09-10 NOTE — PROGRESS NOTES
[facility]  Case Progress Note    9/09/19    Client Name: Javi Salmeron  YOB: 1970  MRN:  [id number]  Treating Provider: Gunjan Muñoz, PhD, [facility]  Type of Session: Aftercare  Number of Minutes: 120    Current Symptoms/Status:  Feeling good about his progress.    Progress Toward Treatment Goals:   Using support system.   One boundary violation.    Intervention: Modality and Description:  Aftercare support group    Response to Intervention:  Reported several developments: had frustrating communication with mother in which he felt she only focused on herself rather than his needs. Following this conversation he blocked her for a week on his phone. He appeared to have mixed feelings about this choice to block her, calling himself burton. This was challenged by group members who pointed out the many times he has put other people's needs above his own. They questioned whether or not he should view putting up a boundary with his mother differently. He stated that during the time he blocked his mother he gained weight and was not taking care of himself. He also noted that he asked a friend about the [employer] alternative and sought out information about the site. He stated that he has continued to not look at porn. Other stressors include work stress and pressure from girlfriend to get . He stated he does not want to get .    Javi reported he was woken up by loud neighbors having sex the previous night and he found this triggering. It appeared to create many questions for him (e.g., do I talk to my son about what he may have heard? Why doesn't my girlfriend make same noise?, etc.)    Assignment:  Stay within boundaries.       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  Monthly aftercare support group.

## 2019-10-07 ENCOUNTER — OFFICE VISIT (OUTPATIENT)
Dept: OTHER | Facility: OUTPATIENT CENTER | Age: 49
End: 2019-10-07

## 2019-10-07 DIAGNOSIS — Z51.89 AFTERCARE: Primary | ICD-10-CM

## 2019-10-07 NOTE — PROGRESS NOTES
[facility]  Case Progress Note    10/07/19    Client Name: Javi Salmeron  YOB: 1970  MRN:  [id number]  Treating Provider: Connie Baugh, PhD  Type of Session: Aftercare  Number of Minutes: 120    Current Symptoms/Status:  Feeling good about his progress.    Progress Toward Treatment Goals:   Using support system.       Intervention: Modality and Description:  Aftercare support group    Response to Intervention:  Been depressed recently although feeling better in last few weeks.  Thinks it is situational related.  But has wondered if he needs adjustment with his medication.  He will be seeing me in a few weeks to review this situation.    Assignment:  Stay within boundaries.       Diagnosis:  Aftercare.    Plan / Need for Future Services:  Continue individual therapy 1X a month.  Monthly aftercare support group.

## 2019-10-29 ENCOUNTER — OFFICE VISIT (OUTPATIENT)
Dept: OTHER | Facility: OUTPATIENT CENTER | Age: 49
End: 2019-10-29
Payer: COMMERCIAL

## 2019-10-29 DIAGNOSIS — F91.8 CONDUCT DISORDER, UNDIFFERENTIATED TYPE: Primary | ICD-10-CM

## 2019-10-29 DIAGNOSIS — F34.1 DYSTHYMIC DISORDER: ICD-10-CM

## 2019-10-29 NOTE — PROGRESS NOTES
[facility]            [address] [place] [address] [address]  [place] [address]                                                                                Phone: [phone]                                                                                  Fax: [phone]                                                                    [email or web address]    ANNUAL UPDATE: Diagnostic Assessment Interview     Date of Service: 10/29/19  Client Name: Javi Salmeron  YOB: 1970  49 year old  MRN:  [id number]  Gender/Gender Identity: Male/Man  Treating Provider: Connie Baugh, PhD  Program: CSB  Type of Session: Assessment  Present in Session: Patient  Number of Minutes: 55     Updated Presenting Problem and Goals:  Patient has a long history of compulsive sexual behavior.  He has made great progress in controlling this.  He completed primary treatment and continues in aftercare and once a month individual psychotherapy.  He has some minor boundary violations regarding the length of time that he views pornography.  Otherwise, he has maintained his boundaries.  He has been able to establish a good relationship over the past several years.  He has improve his relationship with his children.    He recently decided to end his relationship as his needs were not being met.  He has been able to be assertive with his needs and has been honest with his communication.    This is a stressful time for him but is managing his anxiety well and using his maintenance plan.    His goal is to maintain his  Boundaries and to develop an authentic and meaningful intimate relationship.    Updated Mental Health History:   He has managed his anxiety and depression.      Updated Substance Use:   He drinks minimally.  He has no evidence of abuse or dependency.    Updated Medical History:   None.     Updated Family History:   He has improved his relationship with his mother.  He continues to have to set  "boundaries with her but this has been effective.  He has recently developed a relationship with his biological father - who he only recently met - as he was adopted.  This has been very meaningful to him.    His relationship with his ex-wife remains good. He has a much improved relationship with his children.    Updated Current Significant Relationship/Partner:  He recently ended his relationship and is now single again.      Updated Educational History:  None.    Updated Occupational History:  He is rather unhappy with his job and has been looking to find something better.  His performance has been good but the situation has been stressful.    Updated Legal Issues:  None.  ________________________________________________________________  CONCLUSIONS    Updated Strengths and Liabilities:   Javi is a bright and articulate man.  He is highly committed to addressing his mental health concerns and utilizes his therapy well.      Updated Symptoms:    See updated PHQ-9, RAGINI-7, CAGE, and Safety Screening    Updated Mental Status:      Mental Status:   Appearance:  Casually dressed  Behavior/relationship to examiner/demeanor:  Cooperative, Engaged and Pleasant  Orientation: Oriented to person, place, time and situation  Speech Rate:  Normal  Speech Spontaneity:  Normal  Mood:  \"fine\", dysphoric, sad and grieving  Affect:  Appropriate/mood-congruent  Thought Process (Associations):  Logical, Linear and Goal directed  Thought Content:  no evidence of suicidal or homicidal ideation  Abnormal Perception:  None  Attention/Concentration:  Normal  Language:  Intact  Insight:  Good  Judgment:  Good          Interactive Complexity:  Communication difficulties present during the current psychiatric procedure included:  1.  N/A    Updated DSM-5 Diagnoses:  312.89 Other Specified Disruptive, Impulse, and Conduct Disorder (Hypersexual Disorder)    300.4 Dysthymia    Conclusions/Recommendations/Initial Treatment Goals:   The client is a " 49 year old /American person assigned male at birth who identifies as a man. Based on the client's current report of symptoms, client continues to meet criteria for compulsive sexual behavior and dysthymia. The client's mental health concerns continue to affect client's ability to function interpersonally and have been causing clinically significant distress. The client reports minimal alcohol use and no drug use.  Client denies safety concerns. Based on the client's reported symptoms and impact on functioning, the plan for the patient is:  1. Continue supportive individual therapy    2. Continue medication management.   3. Continue to assess the impact of client's family and relational patterns on their current well-being.   4. Continue participation in  Aftercare.       Connie Baugh, PhD, LP

## 2019-11-11 ENCOUNTER — OFFICE VISIT (OUTPATIENT)
Dept: OTHER | Facility: OUTPATIENT CENTER | Age: 49
End: 2019-11-11

## 2019-11-11 DIAGNOSIS — Z51.89 AFTERCARE: Primary | ICD-10-CM

## 2019-11-11 NOTE — PROGRESS NOTES
[facility]  Case Progress Note    11/11/19    Client Name: Javi Salmeron  YOB: 1970  MRN:  [id number]  Treating Provider: Connie Baugh, PhD  Type of Session: Aftercare  Number of Minutes: 120    Health Maintenance Summary - Mental Health Treatment Plan       Status Date      MENTAL HEALTH TX PLAN Next Due 9/29/2020      Done 10/29/2019 [facility] MENTAL HEALTH TX PLAN SCAN     Done 10/17/2018      Done 9/6/2017      Done 9/15/2016      Done 9/17/2015 [facility] MENTAL HEALTH TX PLAN SCAN     Patient has more history with this topic...          Current Symptoms/Status:  Broke up with girlfriend.  Been exploring boundaries now that he is single.  Definite risk situation - and concerned about getting back into old patterns.    Progress Toward Treatment Goals:   Using support system.       Intervention: Modality and Description:  Aftercare support group    Response to Intervention:  Feeling very sad about loss of relationship.  Broke up after 5 years.  Feeling very alone.  Lacking motivation.  Struggling to find appropriate boundaries for himself.    Assignment:  Stay within boundaries.       Diagnosis:  Aftercare.    Plan / Need for Future Services:  Continue individual therapy 1X a month.  Monthly aftercare support group.

## 2019-11-26 ENCOUNTER — OFFICE VISIT (OUTPATIENT)
Dept: OTHER | Facility: OUTPATIENT CENTER | Age: 49
End: 2019-11-26
Payer: COMMERCIAL

## 2019-11-26 DIAGNOSIS — F34.1 DYSTHYMIC DISORDER: ICD-10-CM

## 2019-11-26 DIAGNOSIS — F91.8 CONDUCT DISORDER, UNDIFFERENTIATED TYPE: Primary | ICD-10-CM

## 2019-11-26 NOTE — PROGRESS NOTES
[facility]  Case Progress Note    11/26/19    Client Name: Javi Salmeron  YOB: 1970  MRN:  [id number]  Treating Provider: Connie Baugh, PhD  Type of Session: Individual   Number of Minutes: 55    Current Symptoms/Status:  Feeling good about his progress.    Progress Toward Treatment Goals:   Using support system.       Intervention: Modality and Description:  Individual therapy using CBT, interpersonal.    Response to Intervention:  Processed grief of loss of relationship.  Son also moved out - more loneliness.  Lots of adjustment.  Feeling very good about how he is handling this but sad.  Not triggering negative core fuels.  Using maintenance plan.  No boundary violations despite risk situation.    Assignment:  Stay within boundaries.       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  Monthly aftercare support group.

## 2019-12-03 ASSESSMENT — ANXIETY QUESTIONNAIRES
GAD7 TOTAL SCORE: 0
7. FEELING AFRAID AS IF SOMETHING AWFUL MIGHT HAPPEN: NOT AT ALL
1. FEELING NERVOUS, ANXIOUS, OR ON EDGE: NOT AT ALL
5. BEING SO RESTLESS THAT IT IS HARD TO SIT STILL: NOT AT ALL
6. BECOMING EASILY ANNOYED OR IRRITABLE: NOT AT ALL
2. NOT BEING ABLE TO STOP OR CONTROL WORRYING: NOT AT ALL
3. WORRYING TOO MUCH ABOUT DIFFERENT THINGS: NOT AT ALL

## 2019-12-03 ASSESSMENT — PATIENT HEALTH QUESTIONNAIRE - PHQ9
5. POOR APPETITE OR OVEREATING: NOT AT ALL
SUM OF ALL RESPONSES TO PHQ QUESTIONS 1-9: 3

## 2019-12-04 ASSESSMENT — ANXIETY QUESTIONNAIRES: GAD7 TOTAL SCORE: 0

## 2019-12-09 ENCOUNTER — OFFICE VISIT (OUTPATIENT)
Dept: OTHER | Facility: OUTPATIENT CENTER | Age: 49
End: 2019-12-09

## 2019-12-09 DIAGNOSIS — Z51.89 AFTERCARE: Primary | ICD-10-CM

## 2019-12-12 NOTE — PROGRESS NOTES
[facility]  Case Progress Note    12/09/19    Client Name: Javi Salmeron  YOB: 1970  MRN:  [id number]  Treating Provider: Connie Baugh, PhD  Type of Session: Aftercare  Number of Minutes: 120    Health Maintenance Summary - Mental Health Treatment Plan       Status Date      MENTAL HEALTH TX PLAN Next Due 9/29/2020      Done 10/29/2019 [facility] MENTAL HEALTH TX PLAN SCAN     Done 10/17/2018      Done 9/6/2017      Done 9/15/2016      Done 9/17/2015 [facility] MENTAL HEALTH TX PLAN SCAN     Patient has more history with this topic...          Current Symptoms/Status:  Broke up with girlfriend.  Been exploring boundaries now that he is single.  Definite risk situation - and concerned about getting back into old patterns.    Progress Toward Treatment Goals:   Using support system.       Intervention: Modality and Description:  Aftercare support group    Response to Intervention:  Still grieving loss of relationship.  Son moved out and that has added to his loneliness  Feeling lonely.  Risk situation, managing it pretty well.  Sorting out what are appropriate boundaries.  Received good feeback from group members.      Assignment:  Stay within boundaries.       Diagnosis:  Aftercare.    Plan / Need for Future Services:  Continue individual therapy 1X a month.  Monthly aftercare support group.

## 2019-12-18 ENCOUNTER — OFFICE VISIT (OUTPATIENT)
Dept: OTHER | Facility: OUTPATIENT CENTER | Age: 49
End: 2019-12-18
Payer: COMMERCIAL

## 2019-12-18 DIAGNOSIS — F34.1 DYSTHYMIC DISORDER: ICD-10-CM

## 2019-12-18 DIAGNOSIS — F91.8 CONDUCT DISORDER, UNDIFFERENTIATED TYPE: Primary | ICD-10-CM

## 2020-01-06 ENCOUNTER — OFFICE VISIT (OUTPATIENT)
Dept: OTHER | Facility: OUTPATIENT CENTER | Age: 50
End: 2020-01-06

## 2020-01-06 DIAGNOSIS — Z51.89 AFTERCARE: Primary | ICD-10-CM

## 2020-01-06 NOTE — PROGRESS NOTES
"[facility]  Case Progress Note    12/18/19    Client Name: Javi Salmeron  YOB: 1970  MRN:  [id number]  Treating Provider: Connie Baugh, PhD  Type of Session: Individual  Number of Minutes: 55    Health Maintenance Summary - Mental Health Treatment Plan       Status Date      MENTAL HEALTH TX PLAN Next Due 9/29/2020      Done 10/29/2019 [facility] MENTAL HEALTH TX PLAN SCAN     Done 10/17/2018      Done 9/6/2017      Done 9/15/2016      Done 9/17/2015 HIM MENTAL HEALTH TX PLAN SCAN     Patient has more history with this topic...          Current Symptoms/Status:  Still adjusting to single life.  Has increased risk sitations but handling boundaries well.    Progress Toward Treatment Goals:   Using support system.   Using improved coping skills to avoid relapse.    Intervention: Modality and Description:  Individual, interpersonal, CBT, relapse prevention.    Response to Intervention:  Improved relationship with sons.  Feeling less lonely but it is a struggle.  Feels good about decision to end relationship.  Connecting with \"bio-dad\"  Handling these stress situations well.  Reinforced maintenance plan and relapse prevention strategies.    Assignment:  Stay within boundaries.       Diagnosis:  312.89 (F91.8) Other Specified Disruptive, Impulse Control, and Conduct Disorder (Hypersexual Disorder)    300.4  Dysthymia    Plan / Need for Future Services:  Continue individual therapy 1X a month.  Monthly aftercare support group.       "
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
## 2020-01-27 ENCOUNTER — OFFICE VISIT (OUTPATIENT)
Dept: FAMILY MEDICINE | Facility: CLINIC | Age: 50
End: 2020-01-27
Payer: COMMERCIAL

## 2020-01-27 VITALS
OXYGEN SATURATION: 97 % | BODY MASS INDEX: 28.59 KG/M2 | HEART RATE: 76 BPM | SYSTOLIC BLOOD PRESSURE: 137 MMHG | WEIGHT: 211.08 LBS | TEMPERATURE: 98.2 F | RESPIRATION RATE: 20 BRPM | HEIGHT: 72 IN | DIASTOLIC BLOOD PRESSURE: 87 MMHG

## 2020-01-27 DIAGNOSIS — K75.9 HEPATITIS: ICD-10-CM

## 2020-01-27 DIAGNOSIS — F32.A DEPRESSION, UNSPECIFIED DEPRESSION TYPE: ICD-10-CM

## 2020-01-27 DIAGNOSIS — Z00.00 PREVENTATIVE HEALTH CARE: ICD-10-CM

## 2020-01-27 DIAGNOSIS — F42.9 OBSESSIVE-COMPULSIVE DISORDER, UNSPECIFIED TYPE: Primary | ICD-10-CM

## 2020-01-27 LAB
ALBUMIN SERPL-MCNC: 3.7 G/DL (ref 3.3–4.6)
ALP SERPL-CCNC: 61 U/L (ref 40–150)
ALT SERPL-CCNC: 20 U/L (ref 0–70)
AST SERPL-CCNC: 26 U/L (ref 0–55)
BILIRUB SERPL-MCNC: 0.7 MG/DL (ref 0.2–1.3)
BUN SERPL-MCNC: 19 MG/DL (ref 7–30)
CALCIUM SERPL-MCNC: 9 MG/DL (ref 8.5–10.4)
CHLORIDE SERPLBLD-SCNC: 108 MMOL/L (ref 94–109)
CO2 SERPL-SCNC: 27 MMOL/L (ref 20–32)
CREAT SERPL-MCNC: 1 MG/DL (ref 0.8–1.5)
EGFR CALCULATED (BLACK REFERENCE): 101.3
EGFR CALCULATED (NON BLACK REFERENCE): 83.7
GLUCOSE SERPL-MCNC: 100 MG/DL (ref 60–99)
POTASSIUM SERPL-SCNC: 4 MMOL/L (ref 3.4–5.3)
PROT SERPL-MCNC: 7.3 G/DL (ref 6.8–8.8)
SODIUM SERPL-SCNC: 140 MMOL/L (ref 137.3–146.3)

## 2020-01-27 RX ORDER — NALTREXONE HYDROCHLORIDE 50 MG/1
50 TABLET, FILM COATED ORAL EVERY MORNING
Qty: 90 TABLET | Refills: 0 | Status: SHIPPED | OUTPATIENT
Start: 2020-01-27 | End: 2020-05-01

## 2020-01-27 RX ORDER — CITALOPRAM HYDROBROMIDE 40 MG/1
40 TABLET ORAL EVERY MORNING
Qty: 90 TABLET | Refills: 1 | Status: SHIPPED | OUTPATIENT
Start: 2020-01-27 | End: 2020-06-03

## 2020-01-27 ASSESSMENT — PATIENT HEALTH QUESTIONNAIRE - PHQ9
SUM OF ALL RESPONSES TO PHQ QUESTIONS 1-9: 1
5. POOR APPETITE OR OVEREATING: NOT AT ALL

## 2020-01-27 ASSESSMENT — MIFFLIN-ST. JEOR: SCORE: 1843.31

## 2020-01-27 ASSESSMENT — ANXIETY QUESTIONNAIRES
7. FEELING AFRAID AS IF SOMETHING AWFUL MIGHT HAPPEN: NOT AT ALL
2. NOT BEING ABLE TO STOP OR CONTROL WORRYING: NOT AT ALL
6. BECOMING EASILY ANNOYED OR IRRITABLE: NOT AT ALL
3. WORRYING TOO MUCH ABOUT DIFFERENT THINGS: NOT AT ALL
IF YOU CHECKED OFF ANY PROBLEMS ON THIS QUESTIONNAIRE, HOW DIFFICULT HAVE THESE PROBLEMS MADE IT FOR YOU TO DO YOUR WORK, TAKE CARE OF THINGS AT HOME, OR GET ALONG WITH OTHER PEOPLE: NOT DIFFICULT AT ALL
GAD7 TOTAL SCORE: 0
5. BEING SO RESTLESS THAT IT IS HARD TO SIT STILL: NOT AT ALL
1. FEELING NERVOUS, ANXIOUS, OR ON EDGE: NOT AT ALL

## 2020-01-27 ASSESSMENT — PAIN SCALES - GENERAL: PAINLEVEL: NO PAIN (0)

## 2020-01-27 NOTE — PROGRESS NOTES
"REASON FOR VISIT:  Depression, Anxiety and STD Screening      HISTORY OF PRESENT ILLNESS: Javi Salmeron is a 49 year old male who presents for the above.  I have seen him twice since 10/2018, most recently on 03/20/2019 for elevated blood pressure.    Javi is requesting refills of naltrexone and citalopram, which he takes for anxiety.  His mental health is doing \"very well\".  He has remained active by taking community ed classes, including a ukulele class and a stand up comedy class.  He is in counseling currently.    PHQ-9 SCORE 10/12/2018 1/27/2020   PHQ-9 Total Score 3 1   Some encounter information is confidential and restricted. Go to Review Flowsheets activity to see all data.     RAGINI-7 SCORE 10/12/2018 1/27/2020   Total Score 1 0   Some encounter information is confidential and restricted. Go to Review Flowsheets activity to see all data.       Javi also requests STD screening today.  He was recently sexually active with a new partner after he broke up with his girlfriend of 4 years.  He does have Hepatitis B with negative AB antigen, and has some questions about that.  Denies any sx or known exposure.    Javi has had elevated blood pressure in the past, and has been hesitant to start medication in the past.  At his last visit, he had lost 10 pounds and his BP had dropped.  Today, he has gained 10 pounds and his BP is elevated again.  He notes his BP is always elevated during his first check here.  Alcohol has been very limited, but will have a Mountain Dew once a day.    BP Readings from Last 3 Encounters:   01/27/20 (!) 156/92   03/20/19 (!) 143/91   10/10/18 (!) 146/100     Wt Readings from Last 2 Encounters:   01/27/20 95.7 kg (211 lb 1.3 oz)   03/20/19 91.4 kg (201 lb 8 oz)       SOCIAL HISTORY:  Social History     Social History Narrative    Lives in [place].    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    .    3 sons, one 18 and 14 year old twins.         MEDICATIONS:  Reviewed.     Current " "Outpatient Medications   Medication Sig Dispense Refill     citalopram (CELEXA) 40 MG tablet Take 1 tablet (40 mg) by mouth every morning 30 tablet 0     naltrexone (DEPADE;REVIA) 50 MG tablet Take 1 tablet (50 mg) by mouth every morning 90 tablet 0     omeprazole (PRILOSEC) 20 MG DR capsule TAKE 1 CAPSULE(20 MG) BY MOUTH DAILY 90 capsule 1     Misc. Devices (CERVICAL TRACTION) KIT Use at home as needed and guided by Physical therapy (Patient not taking: Reported on 10/10/2018) 1 kit 0         REVIEW OF SYSTEMS:  POSITIVE for unintended weight gain. Otherwise, the ROS is negative.      OBJECTIVE:      EXAM: Javi is a 49 year old male who appears to be generally healthy.  VITAL SIGNS: BP (!) 156/92 (BP Location: Right arm, Patient Position: Chair, Cuff Size: Adult Large)   Pulse 86   Temp 98.2  F (36.8  C) (Oral)   Resp 20   Ht 1.817 m (5' 11.55\")   Wt 95.7 kg (211 lb 1.3 oz)   SpO2 97%   BMI 28.99 kg/m    Constitutional: healthy, alert and no distress   Psychiatric: mentation appears normal and affect normal/bright  Discussion only.        LABORATORY DATA: Labs, pending.      ASSESSMENT AND PLAN:   1. Javi is a 50 yo who has hypertension, compulsive behavior and      1. Depression, unspecified depression type  -Continue in counseling  - citalopram (CELEXA) 40 MG tablet; Take 1 tablet (40 mg) by mouth every morning  Dispense: 90 tablet; Refill: 1     2. Obsessive-compulsive disorder, unspecified type  - naltrexone (DEPADE/REVIA) 50 MG tablet; Take 1 tablet (50 mg) by mouth every morning  Dispense: 90 tablet; Refill: 0     3. Preventative health care     - HIV Antigen Antibody Combo  - Treponema Abs w Reflex to RPR and Titer  - NEISSERIA GONORRHOEA PCR  - CHLAMYDIA TRACHOMATIS PCR     4. Hepatitis, h/o Hep B.   - Hepatitis C antibody. Also, check Hep B Ab.   - Comprehensive Metabolic Panel ([place])     5. Hypertension:   -Again stressed diet and more activity  Activity is limited by his C.P.  Discussed the " "\"DASH\" diet.   Aim to lose 10 lbs over next 2 months  Recheck Fasting Lipids in 6-8 weeks.      RTC in 6-8 weeks (no food or drink overnight)      Call with any problems or questions.         I, Aayush Reyna, am serving as a scribe to document services personally performed by Mt Becker MD based on data collection and the provider's statements to me. Dr. Becker has reviewed, edited and approved the above note.     --Mt Becker MD  "

## 2020-01-27 NOTE — PATIENT INSTRUCTIONS
"Javi is a 48 yo who has hypertension, compulsive behavior and     1. Depression, unspecified depression type  -Continue in counseling  - citalopram (CELEXA) 40 MG tablet; Take 1 tablet (40 mg) by mouth every morning  Dispense: 90 tablet; Refill: 1    2. Obsessive-compulsive disorder, unspecified type  - naltrexone (DEPADE/REVIA) 50 MG tablet; Take 1 tablet (50 mg) by mouth every morning  Dispense: 90 tablet; Refill: 0    3. Preventative health care    - HIV Antigen Antibody Combo  - Treponema Abs w Reflex to RPR and Titer  - NEISSERIA GONORRHOEA PCR  - CHLAMYDIA TRACHOMATIS PCR    4. Hepatitis, h/o Hep B.   - Hepatitis C antibody  - Comprehensive Metabolic Panel ([place])    5. Hypertension:   -Again stressed diet and more activity  Activity is limited by his C.P.  Discussed the \"DASH\" diet.   Aim to lose 10 lbs over next 2 months  Recheck Fasting Lipids in 6-8 weeks.     RTC in 6-8 weeks (no food or drink overnight)      "

## 2020-01-27 NOTE — NURSING NOTE
"49 year old  Chief Complaint   Patient presents with     Refill Request     STD     screening.        Blood pressure (!) 156/92, pulse 86, temperature 98.2  F (36.8  C), temperature source Oral, resp. rate 20, height 1.817 m (5' 11.55\"), weight 95.7 kg (211 lb 1.3 oz), SpO2 97 %. Body mass index is 28.99 kg/m .  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)      Major depression in complete remission (H)       Wt Readings from Last 2 Encounters:   01/27/20 95.7 kg (211 lb 1.3 oz)   03/20/19 91.4 kg (201 lb 8 oz)     BP Readings from Last 3 Encounters:   01/27/20 (!) 156/92   03/20/19 (!) 143/91   10/10/18 (!) 146/100         Current Outpatient Medications   Medication     citalopram (CELEXA) 40 MG tablet     naltrexone (DEPADE;REVIA) 50 MG tablet     omeprazole (PRILOSEC) 20 MG DR capsule     Misc. Devices (CERVICAL TRACTION) KIT     No current facility-administered medications for this visit.        Social History     Tobacco Use     Smoking status: Never Smoker     Smokeless tobacco: Never Used   Substance Use Topics     Alcohol use: Yes     Comment: one or two a month     Drug use: No       Health Maintenance Due   Topic Date Due     PREVENTIVE CARE VISIT  1970     DEPRESSION ACTION PLAN  1970     ZOSTER IMMUNIZATION (1 of 2) 10/20/2018     INFLUENZA VACCINE (1) 09/01/2019       No results found for: LAXMI Acuña CMA, BRANDO  January 27, 2020 3:24 PM  "

## 2020-01-28 LAB
C TRACH DNA SPEC QL NAA+PROBE: NEGATIVE
N GONORRHOEA DNA SPEC QL NAA+PROBE: NEGATIVE
SPECIMEN SOURCE: NORMAL
SPECIMEN SOURCE: NORMAL

## 2020-01-28 ASSESSMENT — ANXIETY QUESTIONNAIRES: GAD7 TOTAL SCORE: 0

## 2020-01-29 LAB
HBV SURFACE AB SERPL IA-ACNC: 42.19 M[IU]/ML
HCV AB SERPL QL IA: NONREACTIVE
HIV 1+2 AB+HIV1 P24 AG SERPL QL IA: NONREACTIVE
T PALLIDUM AB SER QL: NONREACTIVE

## 2020-02-03 ENCOUNTER — OFFICE VISIT (OUTPATIENT)
Dept: OTHER | Facility: OUTPATIENT CENTER | Age: 50
End: 2020-02-03

## 2020-02-03 DIAGNOSIS — Z51.89 AFTERCARE: Primary | ICD-10-CM
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
## 2020-02-12 ENCOUNTER — OFFICE VISIT (OUTPATIENT)
Dept: OTHER | Facility: OUTPATIENT CENTER | Age: 50
End: 2020-02-12
Payer: COMMERCIAL

## 2020-02-12 DIAGNOSIS — F91.8 CONDUCT DISORDER, UNDIFFERENTIATED TYPE: Primary | ICD-10-CM

## 2020-02-12 DIAGNOSIS — F34.1 DYSTHYMIC DISORDER: ICD-10-CM

## 2020-02-12 NOTE — PROGRESS NOTES
[facility]  Case Progress Note    2/12/20    Client Name: Javi Salmeron  YOB: 1970  MRN:  [id number]  Treating Provider: Connie Baugh, PhD  Type of Session: Individual  Number of Minutes: 55    Health Maintenance Summary - Mental Health Treatment Plan       Status Date      MENTAL HEALTH TX PLAN Next Due 9/29/2020      Done 10/29/2019 HIM MENTAL HEALTH TX PLAN SCAN     Done 10/17/2018      Done 9/6/2017      Done 9/15/2016      Done 9/17/2015 HIM MENTAL HEALTH TX PLAN SCAN     Patient has more history with this topic...          Current Symptoms/Status:  Still adjusting to single life.  Has increased risk sitations but handling boundaries well.    Progress Toward Treatment Goals:   Using support system.   Using improved coping skills to avoid relapse.    Intervention: Modality and Description:  Individual, interpersonal, CBT, relapse prevention.    Response to Intervention:  Examined risk factors.  Brought closure to his broken relationship.  Adjusting to his kids living with mom.    Positive cycle  Taking classes.  Improved relationship with sons.  Dating!  Having success.    Handling these stress situations well.  Reinforced maintenance plan and relapse prevention strategies.    Assignment:  Stay within boundaries.       Diagnosis:  312.89 (F91.8) Other Specified Disruptive, Impulse Control, and Conduct Disorder (Hypersexual Disorder)    300.4  Dysthymia    Plan / Need for Future Services:  Continue individual therapy 1X a month.  Monthly aftercare support group.

## 2020-02-23 ENCOUNTER — HEALTH MAINTENANCE LETTER (OUTPATIENT)
Age: 50
End: 2020-02-23

## 2020-03-02 ENCOUNTER — OFFICE VISIT (OUTPATIENT)
Dept: OTHER | Facility: OUTPATIENT CENTER | Age: 50
End: 2020-03-02

## 2020-03-02 DIAGNOSIS — Z51.89 AFTERCARE: Primary | ICD-10-CM

## 2020-03-02 NOTE — PROGRESS NOTES
[facility]  Case Progress Note    3/02/20    Client Name: Javi Salmeron  YOB: 1970  MRN:  [id number]  Treating Provider: Connie Baugh, PhD  Type of Session: Aftercare  Number of Minutes: 120    Health Maintenance Summary - Mental Health Treatment Plan       Status Date      MENTAL HEALTH TX PLAN Next Due 9/29/2020      Done 10/29/2019 HIM MENTAL HEALTH TX PLAN SCAN     Done 10/17/2018      Done 9/6/2017      Done 9/15/2016      Done 9/17/2015 HIM MENTAL HEALTH TX PLAN SCAN     Patient has more history with this topic...          Current Symptoms/Status:  Has been very stable after break up with his ex-girlfriend.  Starting to date.  Not having issues with CSB.      Progress Toward Treatment Goals:   Using support system.       Intervention: Modality and Description:  Aftercare support group    Response to Intervention:  Processed some of his dating experiences.  Feeling pretty good about how he is dealing with all of this.    Assignment:  Stay within boundaries.       Diagnosis:  Aftercare.    Plan / Need for Future Services:  Continue individual therapy 1X a month.  Monthly aftercare support group.

## 2020-03-19 DIAGNOSIS — K52.9 DYSPEPTIC DIARRHEA: ICD-10-CM

## 2020-03-20 NOTE — TELEPHONE ENCOUNTER
Last Office Visit: 1/27/20  [place] [facility] [place]: none  Medication last refilled: 8/23/20 #90 + 1 refill    Jia Christian RN  03/20/20  2:10 PM

## 2020-04-06 ENCOUNTER — VIRTUAL VISIT (OUTPATIENT)
Dept: OTHER | Facility: OUTPATIENT CENTER | Age: 50
End: 2020-04-06

## 2020-04-06 DIAGNOSIS — Z51.89 AFTERCARE: Primary | ICD-10-CM

## 2020-04-15 ENCOUNTER — VIRTUAL VISIT (OUTPATIENT)
Dept: OTHER | Facility: OUTPATIENT CENTER | Age: 50
End: 2020-04-15
Payer: COMMERCIAL

## 2020-04-15 DIAGNOSIS — F34.1 DYSTHYMIC DISORDER: ICD-10-CM

## 2020-04-15 DIAGNOSIS — F91.8 CONDUCT DISORDER, UNDIFFERENTIATED TYPE: Primary | ICD-10-CM

## 2020-04-19 NOTE — PROGRESS NOTES
[facility]  Case Progress Note    4/15/20    Client Name: Javi Salmeron  YOB: 1970  MRN:  [id number]  Treating Provider: Connie Baugh, PhD  Type of Session: Individual  Number of Minutes: 55    Health Maintenance Summary - Mental Health Treatment Plan       Status Date      MENTAL HEALTH TX PLAN Next Due 9/29/2020      Done 10/29/2019 HIM MENTAL HEALTH TX PLAN SCAN     Done 10/17/2018      Done 9/6/2017      Done 9/15/2016      Done 9/17/2015 HIM MENTAL HEALTH TX PLAN SCAN     Patient has more history with this topic...        Telemedicine Visit: The patient's condition can be safely assessed and treated via synchronous audio and visual telemedicine encounter.      Reason for Telemedicine Visit: Covid    Originating Site (Patient Location): Patient's home    Distant Site (Provider Location): [facility]: [facility]    Consent:  The patient/guardian has verbally consented to: the potential risks and benefits of telemedicine (video visit) versus in person care; bill my insurance or make self-payment for services provided; and responsibility for payment of non-covered services.     Mode of Communication:  Video Conference via [employer]    As the provider I attest to compliance with applicable laws and regulations related to telemedicine.    Current Symptoms/Status:  Still adjusting to single life.  Has increased risk sitations but handling boundaries well.    Progress Toward Treatment Goals:   Using support system.   Using improved coping skills to avoid relapse.    Intervention: Modality and Description:  Individual, interpersonal, CBT, relapse prevention.    Response to Intervention:  Has been quite lonely and depressed.  Went over to family for dinner - further aggravating his loneliness.  Son came over for dinner and that seemed to help.  Challenged him about his boundaries regarding social distancing.  Encouraged him to use more postive coping skills  More  social support.    Positive cycle  Taking classes.  Improved relationship with sons.  Dating!  Struggling.  Handling these stress situations reasonably well.  Reinforced maintenance plan and relapse prevention strategies.    Assignment:  Stay within boundaries.       Diagnosis:  312.89 (F91.8) Other Specified Disruptive, Impulse Control, and Conduct Disorder (Hypersexual Disorder)    300.4  Dysthymia    Plan / Need for Future Services:  Continue individual therapy 1X a month.  Monthly aftercare support group.

## 2020-04-29 DIAGNOSIS — F42.9 OBSESSIVE-COMPULSIVE DISORDER, UNSPECIFIED TYPE: ICD-10-CM

## 2020-04-29 NOTE — TELEPHONE ENCOUNTER
Last time prescribed: 1/27/20, 90 tabs x 0 refills  Last office visit: 1/27/2020  Next appointment: 6/3/2020    Routing refill request to provider for review/approval because: Drug not on the [facility] refill protocol     Jia Christian RN  04/30/20  10:59 AM

## 2020-05-01 RX ORDER — NALTREXONE HYDROCHLORIDE 50 MG/1
50 TABLET, FILM COATED ORAL EVERY MORNING
Qty: 45 TABLET | Refills: 0 | Status: SHIPPED | OUTPATIENT
Start: 2020-05-01 | End: 2020-06-03

## 2020-05-03 NOTE — PROGRESS NOTES
[facility]  Case Progress Note    4/06/20    Client Name: Javi Salmeron  YOB: 1970  MRN:  [id number]  Treating Provider: Connie Baugh, PhD  Type of Session: Aftercare  Number of Minutes: 120    Health Maintenance Summary - Mental Health Treatment Plan       Status Date      MENTAL HEALTH TX PLAN Next Due 9/29/2020      Done 10/29/2019 [facility] MENTAL HEALTH TX PLAN SCAN     Done 10/17/2018      Done 9/6/2017      Done 9/15/2016      Done 9/17/2015 HIM MENTAL HEALTH TX PLAN SCAN     Patient has more history with this topic...          Current Symptoms/Status:  Has been very stable after break up with his ex-girlfriend.  Starting to date.  Not having issues with CSB.      Progress Toward Treatment Goals:   Using support system.       Intervention: Modality and Description:  Aftercare support group    Response to Intervention:  Processed some of his dating experiences.   Things have been more stressful.  Lots of feelings of loneliness.  Some depression.  Received supportive feedback from group.        Assignment:  Stay within boundaries.       Diagnosis:  Aftercare.    Plan / Need for Future Services:  Continue individual therapy 1X a month.  Monthly aftercare support group.

## 2020-05-04 ENCOUNTER — VIRTUAL VISIT (OUTPATIENT)
Dept: OTHER | Facility: OUTPATIENT CENTER | Age: 50
End: 2020-05-04

## 2020-05-04 DIAGNOSIS — Z51.89 AFTERCARE: Primary | ICD-10-CM

## 2020-05-04 NOTE — PROGRESS NOTES
[facility]  Case Progress Note    5/04/20    Client Name: Javi Salmeron  YOB: 1970  MRN:  [id number]  Treating Provider: Connie Baugh, PhD  Type of Session: Aftercare  Number of Minutes: 120    Health Maintenance Summary - Mental Health Treatment Plan       Status Date      MENTAL HEALTH TX PLAN Next Due 9/29/2020      Done 10/29/2019 HIM MENTAL HEALTH TX PLAN SCAN     Done 10/17/2018      Done 9/6/2017      Done 9/15/2016      Done 9/17/2015 HIM MENTAL HEALTH TX PLAN SCAN     Patient has more history with this topic...      Video start time: 4:30  Video end time: 6:30    Telemedicine Visit: The patient's condition can be safely assessed and treated via synchronous audio and visual telemedicine encounter.      Reason for Telemedicine Visit: Services only offered telehealth - COVID    Originating Site (Patient Location): Patient's home    Distant Site (Provider Location): [facility] Clinics: [facility]    Consent:  The patient/guardian has verbally consented to: the potential risks and benefits of telemedicine (video visit) versus in person care; bill my insurance or make self-payment for services provided; and responsibility for payment of non-covered services.     Mode of Communication:  Video Conference via Zoom    As the provider I attest to compliance with applicable laws and regulations related to telemedicine.        Current Symptoms/Status:  Has been very stable after break up with his ex-girlfriend.  Starting to date.  Not having issues with CSB.      Progress Toward Treatment Goals:   Using support system.       Intervention: Modality and Description:  Aftercare support group    Response to Intervention:  Has been very lonely.  Connecting with his sons and his family - taking some risks.  Ex-wife inviting him over to dinner.    Received supportive feedback from group.    Assignment:  Stay within boundaries.       Diagnosis:  Aftercare.    Plan / Need for Future  Services:  Continue individual therapy 1X a month.  Monthly aftercare support group.

## 2020-06-01 ENCOUNTER — VIRTUAL VISIT (OUTPATIENT)
Dept: OTHER | Facility: OUTPATIENT CENTER | Age: 50
End: 2020-06-01

## 2020-06-01 DIAGNOSIS — Z51.89 AFTERCARE: Primary | ICD-10-CM
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
## 2020-06-03 ENCOUNTER — VIRTUAL VISIT (OUTPATIENT)
Dept: FAMILY MEDICINE | Facility: CLINIC | Age: 50
End: 2020-06-03
Payer: COMMERCIAL

## 2020-06-03 DIAGNOSIS — K52.9 DYSPEPTIC DIARRHEA: ICD-10-CM

## 2020-06-03 DIAGNOSIS — F32.A DEPRESSION, UNSPECIFIED DEPRESSION TYPE: ICD-10-CM

## 2020-06-03 DIAGNOSIS — F42.9 OBSESSIVE-COMPULSIVE DISORDER, UNSPECIFIED TYPE: ICD-10-CM

## 2020-06-03 RX ORDER — CITALOPRAM HYDROBROMIDE 40 MG/1
40 TABLET ORAL EVERY MORNING
Qty: 90 TABLET | Refills: 3 | Status: SHIPPED | OUTPATIENT
Start: 2020-06-03 | End: 2021-07-16

## 2020-06-03 RX ORDER — NALTREXONE HYDROCHLORIDE 50 MG/1
50 TABLET, FILM COATED ORAL EVERY MORNING
Qty: 90 TABLET | Refills: 3 | Status: SHIPPED | OUTPATIENT
Start: 2020-06-03 | End: 2021-09-29

## 2020-06-03 NOTE — PROGRESS NOTES
Family Medicine Video Visit Note  Javi Salmeron is being evaluated via a billable video visit.     Consent documented below.  No chief complaint on file.                HPI     Video Start Time: 3:59 PM  THIS is the time provider and patient connect.    Javi Salmeron is a 49 year old male who presents to the clinic via a Videohealth visit during the pandemic.     He is a 49-year-old who I have seen on a few occasions before, the last visit was about 4 months ago on January 27.  Since then the coronavirus pandemic began and healthcare became somewhat interrupted.    Javi has been on Celexa for his depression.  States his depression is stable.  No tremendous mood swings.    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  He has not regularly been exercising.    Continues to take naloxone for his impulse control disorder which previously was manifested via excessive porn viewing.  He is still in counseling with Connie Baugh psychologist.     He has not been checking his blood pressure.  States his weight is 214 pounds.                Review of Systems:     Constitutional, HEENT, cardiovascular, pulmonary, gi and gu systems are negative, except as otherwise noted.         Physical Exam:   He appeared well on the video screen.  Respiration rate was normal.  He was in no distress.  Reports weight of 214lbs today.          Assessment and Plan       1. Depression - Stable on Celexa at 40 mg/day.   Refilled for 3 months with 3 RF    2. Impulse control difficulties - Stable on Naloxone for over 10 years. Will refill    3. Hypertension with high body weight.   -Suggested losing about 15-20 lbs. Now, at 214 lbs.   Goal is 195 lbs by Nov 3, 2020.   Also, check blood pressure twice daily at home. Record numbers and send me list in 2 weeks.       Patient Active Problem List   Diagnosis     Dysthymic disorder     Aftercare     Other cerebral palsy (H)     Cervical radiculopathy     Family history of diabetes mellitus     Other  "Specified Disruptive, Impulse Control or Conduct Disorder (Hypersexual Disorder)      Major depression in complete remission (H)       After Visit Information:  Patient chose to view AVS via [employer]    No follow-ups on file.  Mt Becker MD      Video-Visit Details           Video Visit Consent       The patient has been notified of following:     \"This video visit will be conducted via a call between you and your physician/provider. We have found that certain health care needs can be provided without the need for an in-person physical exam.  This service lets us provide the care you need with a video conversation.  If a prescription is necessary we can send it directly to your pharmacy.  If lab work is needed we can place an order for that and you can then stop by our lab to have the test done at a later time.    Video visits are billed at different rates depending on your insurance coverage.  Please reach out to your insurance provider with any questions.    If during the course of the call the physician/provider feels a video visit is not appropriate, you will not be charged for this service.\"    Patient has given verbal consent for Video visit? Yes    How would you like to obtain your AVS? [employer]    Patient would like the video invitation sent by: Send to e-mail at: [email or web address]    Will anyone else be joining your video visit? No        Type of service:  Video Visit  Video Start Time: 3:59pm  THIS is the time provider and patient connect.  Video End Time (time video stopped): 4:19 PM    Originating Location (pt. Location): Home    Distant Location (provider location):  [facility]     Mode of Communication:  Video Conference via [employer]                                      "

## 2020-07-13 ENCOUNTER — VIRTUAL VISIT (OUTPATIENT)
Dept: OTHER | Facility: OUTPATIENT CENTER | Age: 50
End: 2020-07-13

## 2020-07-13 DIAGNOSIS — Z51.89 AFTERCARE: Primary | ICD-10-CM

## 2020-07-13 NOTE — PROGRESS NOTES
[facility]  Case Progress Note    7/13/20    Client Name: Javi Salmeron  YOB: 1970  MRN:  [id number]  Treating Provider: Connie Baugh, PhD  Type of Session: Aftercare  Number of Minutes: 120    Health Maintenance Summary - Mental Health Treatment Plan       Status Date      MENTAL HEALTH TX PLAN Next Due 9/29/2020      Done 10/29/2019 HIM MENTAL HEALTH TX PLAN SCAN     Done 10/17/2018      Done 9/6/2017      Done 9/15/2016      Done 9/17/2015 HIM MENTAL HEALTH TX PLAN SCAN     Patient has more history with this topic...      Video start time: 4:30  Video end time: 6:30    Telemedicine Visit: The patient's condition can be safely assessed and treated via synchronous audio and visual telemedicine encounter.      Reason for Telemedicine Visit: Services only offered telehealth - COVID    Originating Site (Patient Location): Patient's home    Distant Site (Provider Location): [facility] Clinics: [facility]    Consent:  The patient/guardian has verbally consented to: the potential risks and benefits of telemedicine (video visit) versus in person care; bill my insurance or make self-payment for services provided; and responsibility for payment of non-covered services.     Mode of Communication:  Video Conference via Zoom    As the provider I attest to compliance with applicable laws and regulations related to telemedicine.        Current Symptoms/Status:  Has been starting to date.  Not having issues with CSB.      Progress Toward Treatment Goals:   Using support system.       Intervention: Modality and Description:  Aftercare support group    Response to Intervention:  Processed his feelings related to dating in the age of COVID.  Is open with women about his past and for the most part gets very positive feedback.  But has not had success in developing relationship.    Feeling frustrated, triggeres his core negative fuels.      Assignment:  Stay within boundaries.        Diagnosis:  Aftercare.    Plan / Need for Future Services:  Continue individual therapy 1X a month.  Monthly aftercare support group.

## 2020-07-21 ENCOUNTER — VIRTUAL VISIT (OUTPATIENT)
Dept: OTHER | Facility: OUTPATIENT CENTER | Age: 50
End: 2020-07-21
Payer: COMMERCIAL

## 2020-07-21 DIAGNOSIS — F34.1 DYSTHYMIC DISORDER: ICD-10-CM

## 2020-07-21 DIAGNOSIS — F91.8 CONDUCT DISORDER, UNDIFFERENTIATED TYPE: Primary | ICD-10-CM

## 2020-08-03 ENCOUNTER — VIRTUAL VISIT (OUTPATIENT)
Dept: OTHER | Facility: OUTPATIENT CENTER | Age: 50
End: 2020-08-03

## 2020-08-03 DIAGNOSIS — Z51.89 AFTERCARE: Primary | ICD-10-CM

## 2020-08-03 NOTE — PROGRESS NOTES
[facility]  Case Progress Note    8/03/20    Client Name: Javi Salmeron  YOB: 1970  MRN:  [id number]  Treating Provider: Connie Baugh, PhD  Type of Session: Aftercare  Number of Minutes: 120    Health Maintenance Summary - Mental Health Treatment Plan       Status Date      MENTAL HEALTH TX PLAN Next Due 9/29/2020      Done 10/29/2019 HIM MENTAL HEALTH TX PLAN SCAN     Done 10/17/2018      Done 9/6/2017      Done 9/15/2016      Done 9/17/2015 HIM MENTAL HEALTH TX PLAN SCAN     Patient has more history with this topic...      Video start time: 4:30  Video end time: 6:30    Telemedicine Visit: The patient's condition can be safely assessed and treated via synchronous audio and visual telemedicine encounter.      Reason for Telemedicine Visit: Services only offered telehealth - COVID    Originating Site (Patient Location): Patient's home    Distant Site (Provider Location): [facility] Clinics: [facility]    Consent:  The patient/guardian has verbally consented to: the potential risks and benefits of telemedicine (video visit) versus in person care; bill my insurance or make self-payment for services provided; and responsibility for payment of non-covered services.     Mode of Communication:  Video Conference via Zoom    As the provider I attest to compliance with applicable laws and regulations related to telemedicine.        Current Symptoms/Status:  Has been starting to date.  Not having issues with CSB.      Progress Toward Treatment Goals:   Using support system.       Intervention: Modality and Description:  Aftercare support group    Response to Intervention:  Feeling better about his dating behavior.  Connecting with one of his sons that will feel good.  Feeling more grounded and back into postive cycle.    Assignment:  Stay within boundaries.       Diagnosis:  Aftercare.    Plan / Need for Future Services:  Continue individual therapy 1X a month.  Monthly aftercare support group.

## 2020-08-10 DIAGNOSIS — I10 ESSENTIAL HYPERTENSION, BENIGN: Primary | ICD-10-CM

## 2020-08-10 RX ORDER — AMLODIPINE BESYLATE 2.5 MG/1
2.5 TABLET ORAL DAILY
Qty: 30 TABLET | Refills: 1 | Status: SHIPPED | OUTPATIENT
Start: 2020-08-10 | End: 2020-09-10

## 2020-08-20 NOTE — PROGRESS NOTES
[facility]  Case Progress Note    7/21/20    Client Name: Javi Salmeron  YOB: 1970  MRN:  [id number]  Treating Provider: Connie Baugh, PhD  Type of Session: Individual  Number of Minutes: 55    Health Maintenance Summary - Mental Health Treatment Plan       Status Date      MENTAL HEALTH TX PLAN Next Due 9/29/2020      Done 10/29/2019 HIM MENTAL HEALTH TX PLAN SCAN     Done 10/17/2018      Done 9/6/2017      Done 9/15/2016      Done 9/17/2015 HIM MENTAL HEALTH TX PLAN SCAN     Patient has more history with this topic...      Video start time: 1:00  Video end time: 1:55    Telemedicine Visit: The patient's condition can be safely assessed and treated via synchronous audio and visual telemedicine encounter.      Reason for Telemedicine Visit: Services only offered telehealth - COVID    Originating Site (Patient Location): Patient's home    Distant Site (Provider Location): [facility] Clinics: [facility]    Consent:  The patient/guardian has verbally consented to: the potential risks and benefits of telemedicine (video visit) versus in person care; bill my insurance or make self-payment for services provided; and responsibility for payment of non-covered services.     Mode of Communication:  Video Conference via Doxy    As the provider I attest to compliance with applicable laws and regulations related to telemedicine.        Current Symptoms/Status:  Has been starting to date.  Not having issues with CSB.      Progress Toward Treatment Goals:   Using support system.       Intervention: Modality and Description:  Individual, interpersonal, CBT    Response to Intervention:  Processed his feelings about dating.  Feeling better than before.  Not as triggering.  Back into boundaries.  Feeling good about himself.  Back into positive cycle.    Assignment:  Stay within boundaries.       Diagnosis:  Aftercare.    Plan / Need for Future Services:  Continue individual therapy 1X a month.  Monthly  aftercare support group.

## 2020-09-03 ENCOUNTER — APPOINTMENT (OUTPATIENT)
Dept: OTHER | Facility: OUTPATIENT CENTER | Age: 50
End: 2020-09-03
Payer: COMMERCIAL

## 2020-09-10 DIAGNOSIS — I10 ESSENTIAL HYPERTENSION, BENIGN: ICD-10-CM

## 2020-09-10 RX ORDER — AMLODIPINE BESYLATE 5 MG/1
5 TABLET ORAL DAILY
Qty: 30 TABLET | Refills: 0 | Status: SHIPPED | OUTPATIENT
Start: 2020-09-10 | End: 2020-10-15

## 2020-09-10 NOTE — PROGRESS NOTES
Received message of Javi's BP readings on 2.5mg of Amlodipine. They were still elevated. Will increase to 5mg/day.  Rx sent in for 30. Asked to keep up with daily measures and send back numbers in 3 weeks

## 2020-09-14 ENCOUNTER — VIRTUAL VISIT (OUTPATIENT)
Dept: OTHER | Facility: OUTPATIENT CENTER | Age: 50
End: 2020-09-14

## 2020-09-14 DIAGNOSIS — Z51.89 AFTERCARE: Primary | ICD-10-CM

## 2020-09-14 NOTE — PROGRESS NOTES
[facility]  Case Progress Note    9/14/20    Client Name: Javi Salmeron  YOB: 1970  MRN:  [id number]  Treating Provider: Connie Baugh, PhD  Type of Session: Aftercare  Number of Minutes: 120    Health Maintenance Summary - Mental Health Treatment Plan       Status Date      MENTAL HEALTH TX PLAN Next Due 9/29/2020      Done 10/29/2019 HIM MENTAL HEALTH TX PLAN SCAN     Done 10/17/2018      Done 9/6/2017      Done 9/15/2016      Done 9/17/2015 HIM MENTAL HEALTH TX PLAN SCAN     Patient has more history with this topic...      Video start time: 4:30  Video end time: 6:30    Telemedicine Visit: The patient's condition can be safely assessed and treated via synchronous audio and visual telemedicine encounter.      Reason for Telemedicine Visit: Services only offered telehealth - COVID    Originating Site (Patient Location): Patient's home    Distant Site (Provider Location): [facility] Clinics: [facility]    Consent:  The patient/guardian has verbally consented to: the potential risks and benefits of telemedicine (video visit) versus in person care; bill my insurance or make self-payment for services provided; and responsibility for payment of non-covered services.     Mode of Communication:  Video Conference via Zoom    As the provider I attest to compliance with applicable laws and regulations related to telemedicine.        Current Symptoms/Status:  Has been starting to date.  Not having issues with CSB.      Progress Toward Treatment Goals:   Using support system.       Intervention: Modality and Description:  Aftercare support group    Response to Intervention:  Feeling a bit discouraged about his dating behavior.  Tough problems at work.  Fearing being laid off.  Using core positive fuels and getting support from group.    Assignment:  Stay within boundaries.       Diagnosis:  Aftercare.    Plan / Need for Future Services:  Continue individual therapy 1X a month.  Monthly aftercare  support group.

## 2020-10-05 ENCOUNTER — VIRTUAL VISIT (OUTPATIENT)
Dept: PSYCHOLOGY | Facility: CLINIC | Age: 50
End: 2020-10-05

## 2020-10-05 DIAGNOSIS — Z51.89 AFTERCARE: Primary | ICD-10-CM

## 2020-10-05 PROCEDURE — 99207 PR CSH SUPPORT GROUP 120 MINUTES: CPT | Mod: 95

## 2020-10-05 PROCEDURE — 99207 PR NO CHARGE LOS: CPT

## 2020-10-05 NOTE — PROGRESS NOTES
[facility]  Case Progress Note    10/05/20    Client Name: Javi Salmeron  YOB: 1970  MRN:  [id number]  Treating Provider: Connie Baugh, PhD  Type of Session: Aftercare  Number of Minutes: 120    Health Maintenance Summary - Mental Health Treatment Plan       Status Date      MENTAL HEALTH TX PLAN Overdue 9/29/2020      Done 10/29/2019 HIM MENTAL HEALTH TX PLAN SCAN     Done 10/17/2018      Done 9/6/2017      Done 9/15/2016      Done 9/17/2015 HIM MENTAL HEALTH TX PLAN SCAN     Patient has more history with this topic...      Video start time: 4:30  Video end time: 6:30    Telemedicine Visit: The patient's condition can be safely assessed and treated via synchronous audio and visual telemedicine encounter.      Reason for Telemedicine Visit: Services only offered telehealth - COVID    Originating Site (Patient Location): Patient's home    Distant Site (Provider Location): [facility] Clinics: [facility]    Consent:  The patient/guardian has verbally consented to: the potential risks and benefits of telemedicine (video visit) versus in person care; bill my insurance or make self-payment for services provided; and responsibility for payment of non-covered services.     Mode of Communication:  Video Conference via Zoom    As the provider I attest to compliance with applicable laws and regulations related to telemedicine.        Current Symptoms/Status:  Has been starting to date.  Not having issues with CSB.      Progress Toward Treatment Goals:   Using support system.       Intervention: Modality and Description:  Aftercare support group    Response to Intervention:  Feeling a bit discouraged about his dating behavior.  Needing suport from group.  Group members provided solace to his situation.  Felt better    Assignment:  Stay within boundaries.       Diagnosis:  Aftercare.    Plan / Need for Future Services:  Continue individual therapy 1X a month.  Monthly aftercare support group.

## 2020-10-12 DIAGNOSIS — I10 ESSENTIAL HYPERTENSION, BENIGN: ICD-10-CM

## 2020-10-13 NOTE — TELEPHONE ENCOUNTER
Last office visit: 6/3/20  Next appointment: none  Medication last refilled: 9/10/20 #30+0RF  Last labs: 1/27/20    BP Readings from Last 3 Encounters:   01/27/20 137/87   03/20/19 (!) 143/91   10/10/18 (!) 146/100     Waiting for reply from pt on how the increased dose of amlodipine 5 mg is working for him, requested he send in some readings from past few weeks. Will refill another 30 day supply now, if this is working well we can change to 90 day supply going forward.     Jia Christian, RN  10/15/20  10:25 AM

## 2020-10-15 DIAGNOSIS — I10 ESSENTIAL HYPERTENSION, BENIGN: ICD-10-CM

## 2020-10-15 RX ORDER — AMLODIPINE BESYLATE 5 MG/1
5 TABLET ORAL DAILY
Qty: 30 TABLET | Refills: 0 | Status: SHIPPED | OUTPATIENT
Start: 2020-10-15 | End: 2020-10-15

## 2020-10-15 RX ORDER — AMLODIPINE BESYLATE 10 MG/1
10 TABLET ORAL DAILY
Qty: 30 TABLET | Refills: 1 | Status: SHIPPED | OUTPATIENT
Start: 2020-10-15 | End: 2020-11-23

## 2020-10-16 RX ORDER — AMLODIPINE BESYLATE 10 MG/1
TABLET ORAL
Qty: 90 TABLET | OUTPATIENT
Start: 2020-10-16

## 2020-10-16 RX ORDER — AMLODIPINE BESYLATE 5 MG/1
TABLET ORAL
Qty: 90 TABLET | OUTPATIENT
Start: 2020-10-16

## 2020-10-16 NOTE — TELEPHONE ENCOUNTER
Duplicate request of amlodipine. , just sent in yesterday per Dr. Becker,  No 90 day supply, as new dosage adjustment to 10 mg   ( larger dose)  .  Called pharmacy to relay this info.     Maryellen Montiel RN  October 16, 2020 2:32 PM

## 2020-11-02 ENCOUNTER — VIRTUAL VISIT (OUTPATIENT)
Dept: PSYCHOLOGY | Facility: CLINIC | Age: 50
End: 2020-11-02

## 2020-11-02 DIAGNOSIS — Z51.89 AFTERCARE: Primary | ICD-10-CM

## 2020-11-02 PROCEDURE — 99207 PR CSH SUPPORT GROUP 120 MINUTES: CPT

## 2020-11-02 PROCEDURE — 99207 PR NO CHARGE LOS: CPT

## 2020-11-02 NOTE — PROGRESS NOTES
[facility]  Case Progress Note    11/02/20    Client Name: Javi Salmeron  YOB: 1970  MRN:  [id number]  Treating Provider: Connie Baugh, PhD  Type of Session: Aftercare  Number of Minutes: 120    Health Maintenance Summary - Mental Health Treatment Plan       Status Date      MENTAL HEALTH TX PLAN Overdue 9/29/2020      Done 10/29/2019 HIM MENTAL HEALTH TX PLAN SCAN     Done 10/17/2018      Done 9/6/2017      Done 9/15/2016      Done 9/17/2015 HIM MENTAL HEALTH TX PLAN SCAN     Patient has more history with this topic...      Video start time: 4:30  Video end time: 6:30    Telemedicine Visit: The patient's condition can be safely assessed and treated via synchronous audio and visual telemedicine encounter.      Reason for Telemedicine Visit: Services only offered telehealth - COVID    Originating Site (Patient Location): Patient's home    Distant Site (Provider Location): [facility] Clinics: [facility]    Consent:  The patient/guardian has verbally consented to: the potential risks and benefits of telemedicine (video visit) versus in person care; bill my insurance or make self-payment for services provided; and responsibility for payment of non-covered services.     Mode of Communication:  Video Conference via Zoom    As the provider I attest to compliance with applicable laws and regulations related to telemedicine.    Current Symptoms/Status:  Has dating and this has been challenging for him.  Not having issues with CSB.      Progress Toward Treatment Goals:   Using support system.       Intervention: Modality and Description:  Aftercare support group    Response to Intervention:  Continues to feel discouraged about his dating behavior.  Processed an incident of dating that triggered his core negative fuels..  Group members provided solace to his situation.  Felt better    Assignment:  Stay within boundaries.       Diagnosis:  Aftercare.    Plan / Need for Future Services:  Continue  individual therapy 1X a month.  Monthly aftercare support group.

## 2020-11-04 ENCOUNTER — OFFICE VISIT (OUTPATIENT)
Dept: FAMILY MEDICINE | Facility: CLINIC | Age: 50
End: 2020-11-04
Payer: COMMERCIAL

## 2020-11-04 VITALS
SYSTOLIC BLOOD PRESSURE: 141 MMHG | RESPIRATION RATE: 15 BRPM | HEART RATE: 88 BPM | BODY MASS INDEX: 28.05 KG/M2 | TEMPERATURE: 97.9 F | OXYGEN SATURATION: 96 % | DIASTOLIC BLOOD PRESSURE: 86 MMHG | WEIGHT: 204.25 LBS

## 2020-11-04 DIAGNOSIS — I10 ESSENTIAL HYPERTENSION, BENIGN: Primary | ICD-10-CM

## 2020-11-04 DIAGNOSIS — Z23 NEED FOR VACCINATION: ICD-10-CM

## 2020-11-04 RX ORDER — HYDROCHLOROTHIAZIDE 12.5 MG/1
12.5 TABLET ORAL DAILY
Qty: 30 TABLET | Refills: 0 | Status: SHIPPED | OUTPATIENT
Start: 2020-11-04 | End: 2020-11-27

## 2020-11-04 NOTE — PATIENT INSTRUCTIONS
ASSESSMENT/PLAN:  1. Hypertension with still high numbers despite 10 mg Amlodipine. Also, having some lower extremity edema.  -Discussed options.  -Elected to add hydrochlorothiazide at 12.5 mg in mornings.   -Keep measuring BPs. Send me numbers in about 2 weeks.   Next steps may be: lower Amlodipine (if edema persists).   Consider switch to ACE inhibitor    2. Skin swelling after procedure a year ago.  -Try Vitamin E Oil  -Also, reschedule with your Dermatologist.    Send me a message in about 2 weeks and we can decide on follow up.   -      --Mt Becker MD  [facility], Department of Family Medicine and Community Health

## 2020-11-04 NOTE — NURSING NOTE
Prior to immunization administration, verified patients identity using patient s name and date of birth. Please see Immunization Activity for additional information.     Screening Questionnaire for Adult Immunization    Are you sick today?   No   Do you have allergies to medications, food, a vaccine component or latex?   No   Have you ever had a serious reaction after receiving a vaccination?   No   Do you have a long-term health problem with heart, lung, kidney, or metabolic disease (e.g., diabetes), asthma, a blood disorder, no spleen, complement component deficiency, a cochlear implant, or a spinal fluid leak?  Are you on long-term aspirin therapy?   No   Do you have cancer, leukemia, HIV/AIDS, or any other immune system problem?   No   Do you have a parent, brother, or sister with an immune system problem?   No   In the past 3 months, have you taken medications that affect  your immune system, such as prednisone, other steroids, or anticancer drugs; drugs for the treatment of rheumatoid arthritis, Crohn s disease, or psoriasis; or have you had radiation treatments?   No   Have you had a seizure, or a brain or other nervous system problem?   No   During the past year, have you received a transfusion of blood or blood    products, or been given immune (gamma) globulin or antiviral drug?   No   For women: Are you pregnant or is there a chance you could become       pregnant during the next month?   No   Have you received any vaccinations in the past 4 weeks?   No     Immunization questionnaire answers were all negative.        Per orders of Dr. Becker, injection of Tdap given by Joselyn Flannery CMA. Patient instructed to remain in clinic for 15 minutes afterwards, and to report any adverse reaction to me immediately.       Screening performed by Joselyn Flannery CMA on 11/4/2020 at 2:12 PM.

## 2020-11-04 NOTE — NURSING NOTE
50 year old  Chief Complaint   Patient presents with     Hypertension       Blood pressure (!) 142/96, pulse 92, temperature 97.9  F (36.6  C), temperature source Skin, resp. rate 15, weight 92.6 kg (204 lb 4 oz), SpO2 96 %. Body mass index is 28.05 kg/m .  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        Social History     Tobacco Use     Smoking status: Never Smoker     Smokeless tobacco: Never Used   Substance Use Topics     Alcohol use: Yes     Comment: one or two a month     Drug use: No       Health Maintenance Due   Topic Date Due     PREVENTIVE CARE VISIT  1970     DEPRESSION ACTION PLAN  1970     ZOSTER IMMUNIZATION (1 of 2) 10/20/2018     DTAP/TDAP/TD IMMUNIZATION (2 - Td) 05/24/2020     MENTAL HEALTH TX PLAN  09/29/2020       No results found for: PAP      November 4, 2020 1:38 PM

## 2020-11-04 NOTE — PROGRESS NOTES
Javi Salmeron is a 50 year old male who presents to [facility] today for follow-up of his hypertension.  He has been monitoring his blood pressures at home and brings in his machine.  His readings have been as follows    139/81, 123/81, 135/73, 149/86, 109/80, 145/102, 159/95, 154/99, 159/88, 143/90, 154/88, 138/83, 146/94, 144/88, 124/80, 145/84.   He has been on amlodipine 10 mg/day.  He had not really noticed any edema but then upon further questioning he did notice that there is slight swelling in his lower legs.      Current meds  Current Outpatient Medications   Medication Sig Dispense Refill     amLODIPine (NORVASC) 10 MG tablet Take 1 tablet (10 mg) by mouth daily 30 tablet 1     citalopram (CELEXA) 40 MG tablet Take 1 tablet (40 mg) by mouth every morning 90 tablet 3     naltrexone (DEPADE/REVIA) 50 MG tablet Take 1 tablet (50 mg) by mouth every morning 90 tablet 3       Review Of Systems:  Has a bump/scar on his upper RIGHT back from a skin removal by dermatology about a year ago. He'd like that checked.    Has otherwise been in usual state of health, e.g.   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)      Major depression in complete remission (H)       Current Outpatient Medications   Medication Sig Dispense Refill     amLODIPine (NORVASC) 10 MG tablet Take 1 tablet (10 mg) by mouth daily 30 tablet 1     citalopram (CELEXA) 40 MG tablet Take 1 tablet (40 mg) by mouth every morning 90 tablet 3     naltrexone (DEPADE/REVIA) 50 MG tablet Take 1 tablet (50 mg) by mouth every morning 90 tablet 3  "      Allergies   Allergen Reactions     Dust Mites      Grass      Ragweeds      Codeine Anxiety     \"gets loopy\" - doesn't like how it feels        Social:   Unchanged    EXAM    Vitals: BP (!) 141/86 (BP Location: Right arm, Patient Position: Sitting, Cuff Size: Adult Large)   Pulse 88   Temp 97.9  F (36.6  C) (Skin)   Resp 15   Wt 92.6 kg (204 lb 4 oz)   SpO2 96%   BMI 28.05 kg/m    BMI= Body mass index is 28.05 kg/m .     We tested against his machine and received the same BP  He appears in his usual state of health.  His left upper back was examined due to the concern of the skin swelling and there did appear to be some hypertrophic skin overlying the area where he had a biopsy done over a year ago.  No extending redness.        ASSESSMENT/PLAN:  1. Hypertension with still high numbers despite 10 mg Amlodipine. Also, having some lower extremity edema.  -Discussed options.  -Elected to add hydrochlorothiazide at 12.5 mg in mornings.   -Keep measuring BPs. Send me numbers in about 2 weeks.   Next steps may be: lower Amlodipine (if edema persists).   Consider switch to ACE inhibitor    2. Skin swelling after procedure a year ago.  -Try Vitamin E Oil  -Also, reschedule with your Dermatologist.    Send me a message in about 2 weeks and we can decide on follow up.       --Mt Becker MD  [facility], Department of Family Medicine and Community Health  "

## 2020-11-20 DIAGNOSIS — I10 ESSENTIAL HYPERTENSION, BENIGN: ICD-10-CM

## 2020-11-23 DIAGNOSIS — I10 ESSENTIAL HYPERTENSION, BENIGN: ICD-10-CM

## 2020-11-23 RX ORDER — HYDROCHLOROTHIAZIDE 12.5 MG/1
12.5 TABLET ORAL DAILY
Qty: 30 TABLET | Refills: 0 | Status: CANCELLED | OUTPATIENT
Start: 2020-11-23

## 2020-11-23 NOTE — TELEPHONE ENCOUNTER
amLODIPine (NORVASC) 10 MG tablet  Last Written Prescription Date:  10/15/2020  Last Fill Quantity: 30,   # refills: 1  Last Office Visit : 11/4/2020  Future Office visit:  None    Routing refill request to provider for review/approval because:  [place] Pt.    We do not fill medications for [facility]'s Pt.     Refer to Provider for review      Eleni Rincon RN  Central Triage Red Flags/Med Refills

## 2020-11-24 NOTE — TELEPHONE ENCOUNTER
Called pt - LVM to call back or send Codesion message to update on home bp readings, since change in medications on 11/4/20.

## 2020-11-27 RX ORDER — HYDROCHLOROTHIAZIDE 12.5 MG/1
12.5 TABLET ORAL DAILY
Qty: 30 TABLET | Refills: 0 | Status: SHIPPED | OUTPATIENT
Start: 2020-11-27 | End: 2020-12-21

## 2020-11-27 RX ORDER — AMLODIPINE BESYLATE 10 MG/1
10 TABLET ORAL DAILY
Qty: 30 TABLET | Refills: 0 | Status: SHIPPED | OUTPATIENT
Start: 2020-11-27 | End: 2020-12-22

## 2020-12-07 ENCOUNTER — VIRTUAL VISIT (OUTPATIENT)
Dept: PSYCHOLOGY | Facility: CLINIC | Age: 50
End: 2020-12-07

## 2020-12-07 DIAGNOSIS — Z51.89 AFTERCARE: Primary | ICD-10-CM

## 2020-12-07 PROCEDURE — 99207 PR CSH SUPPORT GROUP 120 MINUTES: CPT | Mod: 95

## 2020-12-07 PROCEDURE — 99207 PR NO CHARGE LOS: CPT

## 2020-12-07 NOTE — PROGRESS NOTES
[facility]  Case Progress Note    12/07/20    Client Name: Javi Salmeron  YOB: 1970  MRN:  [id number]  Treating Provider: Connie Baugh, PhD  Type of Session: Aftercare  Number of Minutes: 120    Health Maintenance Summary - Mental Health Treatment Plan       Status Date      MENTAL HEALTH TX PLAN Overdue 9/29/2020      Done 10/29/2019 HIM MENTAL HEALTH TX PLAN SCAN     Done 10/17/2018      Done 9/6/2017      Done 9/15/2016      Done 9/17/2015 HIM MENTAL HEALTH TX PLAN SCAN     Patient has more history with this topic...      Video start time: 4:30  Video end time: 6:30    Telemedicine Visit: The patient's condition can be safely assessed and treated via synchronous audio and visual telemedicine encounter.      Reason for Telemedicine Visit: Services only offered telehealth - COVID    Originating Site (Patient Location): Patient's home    Distant Site (Provider Location): [facility] Clinics: [facility]    Consent:  The patient/guardian has verbally consented to: the potential risks and benefits of telemedicine (video visit) versus in person care; bill my insurance or make self-payment for services provided; and responsibility for payment of non-covered services.     Mode of Communication:  Video Conference via Zoom    As the provider I attest to compliance with applicable laws and regulations related to telemedicine.    Current Symptoms/Status:  Has dating and this has been challenging for him.  Not having issues with CSB.      Progress Toward Treatment Goals:   Using support system.       Intervention: Modality and Description:  Aftercare support group    Response to Intervention:  Stil struggling with loneliness.  Group members provided solace to his situation.  Felt better    Assignment:  Stay within boundaries.       Diagnosis:  Aftercare.    Plan / Need for Future Services:  Continue individual therapy 1X a month.  Monthly aftercare support group.

## 2020-12-16 DIAGNOSIS — I10 ESSENTIAL HYPERTENSION, BENIGN: Primary | ICD-10-CM

## 2020-12-16 RX ORDER — HYDROCHLOROTHIAZIDE 25 MG/1
25 TABLET ORAL DAILY
Qty: 30 TABLET | Refills: 1 | Status: SHIPPED | OUTPATIENT
Start: 2020-12-16 | End: 2021-01-18

## 2020-12-18 DIAGNOSIS — I10 ESSENTIAL HYPERTENSION, BENIGN: ICD-10-CM

## 2020-12-18 NOTE — TELEPHONE ENCOUNTER
Last visit 11/4/20, future appt 12/21/20  Pt has appt with Dr Becker 12/21.    OK for refill per Dr Rosita Valdez RN  [facility]

## 2020-12-18 NOTE — PROGRESS NOTES
"  Family Medicine Video Visit Note  Javi Salmeron is a 50 year old male who is being evaluated via a billable video visit.  Consent documented below.  Chief Complaint   Patient presents with     Hypertension     blood pressure follow up. Just started new dose     The patient has been notified of following:     Can you please confirm what state you are currently located in? [place]  \"If you are located in a state other than [place] we cannot proceed with your visit.  This is due to state licensing laws that do not allow your provider to practice in another state.  This is not a billing or insurance issue. Please let me know if you need prescriptions filled or have other immediate concerns and I will get that message to your care team. I can also have you speak to our  to make an appointment when you are back in the [place].\"       Video Visit Consent     \"This video visit will be conducted via a call between you and your physician/provider. We have found that certain health care needs can be provided without the need for an in-person physical exam.  This service lets us provide the care you need with a video conversation.  If a prescription is necessary we can send it directly to your pharmacy.  If lab work is needed we can place an order for that and you can then stop by our lab to have the test done at a later time.    Video visits are billed at different rates depending on your insurance coverage.  Please reach out to your insurance provider with any questions.    If during the course of the call the physician/provider feels a video visit is not appropriate, you will not be charged for this service.\"    Patient has given verbal consent for Video visit? Yes  How would you like to obtain your AVS? MyChart  Will anyone else be joining your video visit? No  :[id number]}      Video Start Time: 1:13 PM  Javi Salmeron is a 50 year old male who presents to clinic today for the following health issues:  - Follow up of " BP  His pressures were elevated on Amlodipine 10mg and hydrochlorothiazide 12.5 mg, so 3 days ago, started to increase to 25 mg hydrochlorothiazide    Today: 145/71. Previously was a bit higher. Today is day 3 of higher dose of hydrochlorothiazide at 25 mg  Tomorrow will be spending his first [place] with his biological father. He's excited about that.     Review of systems-Javi is otherwise in his usual state of health.    Objective he appears well and in his usual state of health.  Respiration rate approximately 16 and unlabored.        Assessment essential hypertension with slight improvement on a slightly increased dose of hydrochlorothiazide.    1. Continue on the 25 mg of hydrochlorothiazide and the Amlodpine at 10mg/day  2. Continue to track numbers  3. Schedule for a lab visit (ideally fasting) for Comp and lipid panel. Also, will check Thyroid function tests.   4. Send me your BP number prior to Fernie 10 so we know what to do when your current Rx expires.     Video-Visit Details    Type of service:  Video Visit    Video End Time: 1:25pm    Originating Location (pt. Location): Home    Distant Location (provider location):  [facility]   Platform used for Video Visit: Myles Becker MD

## 2020-12-21 ENCOUNTER — VIRTUAL VISIT (OUTPATIENT)
Dept: FAMILY MEDICINE | Facility: CLINIC | Age: 50
End: 2020-12-21
Payer: COMMERCIAL

## 2020-12-21 VITALS
DIASTOLIC BLOOD PRESSURE: 88 MMHG | BODY MASS INDEX: 27.77 KG/M2 | WEIGHT: 205 LBS | HEIGHT: 72 IN | SYSTOLIC BLOOD PRESSURE: 140 MMHG

## 2020-12-21 DIAGNOSIS — I10 ESSENTIAL HYPERTENSION, BENIGN: Primary | ICD-10-CM

## 2020-12-21 ASSESSMENT — MIFFLIN-ST. JEOR: SCORE: 1817.87

## 2020-12-21 NOTE — PATIENT INSTRUCTIONS
Assessment:   51 yo with hypertension    1. Continue on the 25 mg of hydrochlorothiazide and the Amlodpine at 10mg/day  2. Continue to track numbers  3. Schedule for a lab visit (ideally fasting) for Comp and lipid panel. Also, will check Thyroid function tests.   4. Send me your BP number prior to Fernie 10 so we know what to do when your current Rx expires.

## 2020-12-22 RX ORDER — AMLODIPINE BESYLATE 10 MG/1
TABLET ORAL
Qty: 90 TABLET | Refills: 1 | Status: SHIPPED | OUTPATIENT
Start: 2020-12-22 | End: 2021-09-30

## 2020-12-31 DIAGNOSIS — I10 ESSENTIAL HYPERTENSION, BENIGN: ICD-10-CM

## 2020-12-31 LAB
ALBUMIN SERPL-MCNC: 3.9 G/DL (ref 3.3–4.6)
ALP SERPL-CCNC: 72 U/L (ref 40–150)
ALT SERPL-CCNC: 24 U/L (ref 0–70)
AST SERPL-CCNC: 28 U/L (ref 0–55)
BILIRUB SERPL-MCNC: 0.9 MG/DL (ref 0.2–1.3)
BUN SERPL-MCNC: 19 MG/DL (ref 7–30)
CALCIUM SERPL-MCNC: 9.2 MG/DL (ref 8.5–10.4)
CHLORIDE SERPLBLD-SCNC: 102 MMOL/L (ref 94–109)
CHOLEST SERPL-MCNC: 234 MG/DL (ref 0–200)
CHOLEST/HDLC SERPL: 5.2 {RATIO} (ref 0–5)
CO2 SERPL-SCNC: 32 MMOL/L (ref 20–32)
CREAT SERPL-MCNC: 1 MG/DL (ref 0.8–1.5)
EGFR CALCULATED (BLACK REFERENCE): 100.9
EGFR CALCULATED (NON BLACK REFERENCE): 83.4
FASTING SPECIMEN: YES
GLUCOSE SERPL-MCNC: 112 MG/DL (ref 60–99)
HDLC SERPL-MCNC: 45 MG/DL
LDLC SERPL CALC-MCNC: 136 MG/DL (ref 0–129)
POTASSIUM SERPL-SCNC: 3.6 MMOL/L (ref 3.4–5.3)
PROT SERPL-MCNC: 8.2 G/DL (ref 6.8–8.8)
SODIUM SERPL-SCNC: 144 MMOL/L (ref 137.3–146.3)
TRIGL SERPL-MCNC: 264 MG/DL (ref 0–150)
VLDL-CHOLESTEROL: 53 (ref 7–32)

## 2020-12-31 RX ORDER — HYDROCHLOROTHIAZIDE 25 MG/1
25 TABLET ORAL DAILY
Qty: 30 TABLET | Refills: 1 | Status: CANCELLED | OUTPATIENT
Start: 2020-12-31

## 2021-01-04 ENCOUNTER — VIRTUAL VISIT (OUTPATIENT)
Dept: PSYCHOLOGY | Facility: CLINIC | Age: 51
End: 2021-01-04
Payer: COMMERCIAL

## 2021-01-04 DIAGNOSIS — Z51.89 AFTERCARE: Primary | ICD-10-CM

## 2021-01-04 PROCEDURE — 99207 PR NO CHARGE LOS: CPT

## 2021-01-04 PROCEDURE — 99207 PR CSH SUPPORT GROUP 120 MINUTES: CPT | Mod: 95

## 2021-01-04 NOTE — TELEPHONE ENCOUNTER
Last Office Visit: 12/21/20  Future [facility] Appointments: none  Medication last refilled: 12/16/20 #30 + 1 refill    TOO SOON to refill. Patient is to report back with BP readings prior to 1/10/21 in case there are any medication adjustments needed.     Jia Christian RN  01/04/21  10:19 AM

## 2021-01-04 NOTE — PROGRESS NOTES
[facility]  Case Progress Note    1/04/21    Client Name: Javi Salmeron  YOB: 1970  MRN:  [id number]  Treating Provider: Connie Baugh, PhD  Type of Session: Aftercare  Number of Minutes: 120    Health Maintenance Summary - Mental Health Treatment Plan       Status Date      MENTAL HEALTH TX PLAN Overdue 9/29/2020      Done 10/29/2019 HIM MENTAL HEALTH TX PLAN SCAN     Done 10/17/2018      Done 9/6/2017      Done 9/15/2016      Done 9/17/2015 HIM MENTAL HEALTH TX PLAN SCAN     Patient has more history with this topic...      Video start time: 4:30  Video end time: 6:30    Telemedicine Visit: The patient's condition can be safely assessed and treated via synchronous audio and visual telemedicine encounter.      Reason for Telemedicine Visit: Services only offered telehealth - COVID    Originating Site (Patient Location): Patient's home    Distant Site (Provider Location): [facility] Clinics: [facility]    Consent:  The patient/guardian has verbally consented to: the potential risks and benefits of telemedicine (video visit) versus in person care; bill my insurance or make self-payment for services provided; and responsibility for payment of non-covered services.     Mode of Communication:  Video Conference via Zoom    As the provider I attest to compliance with applicable laws and regulations related to telemedicine.    Current Symptoms/Status:  Has dating and this has been challenging for him.  Not having issues with CSB.    Some distress about his health - uncontrolled blood pressure.    Progress Toward Treatment Goals:   Using support system.       Intervention: Modality and Description:  Aftercare support group    Response to Intervention:  Concerned about health.  High blood pressure  Also fighting with ex-wife.    Stressful but also using support system.  Using maintenance plan    Assignment:  Stay within boundaries.       Diagnosis:  Aftercare    Plan / Need for Future  Services:  Continue individual therapy 1X a month.  Monthly aftercare support group.

## 2021-01-05 LAB — TSH SERPL DL<=0.005 MIU/L-ACNC: 0.99 MU/L (ref 0.4–4)

## 2021-01-18 ENCOUNTER — OFFICE VISIT (OUTPATIENT)
Dept: FAMILY MEDICINE | Facility: CLINIC | Age: 51
End: 2021-01-18
Payer: COMMERCIAL

## 2021-01-18 VITALS
HEART RATE: 80 BPM | RESPIRATION RATE: 13 BRPM | SYSTOLIC BLOOD PRESSURE: 122 MMHG | WEIGHT: 199 LBS | TEMPERATURE: 97.1 F | DIASTOLIC BLOOD PRESSURE: 75 MMHG | OXYGEN SATURATION: 97 % | BODY MASS INDEX: 26.95 KG/M2 | HEIGHT: 72 IN

## 2021-01-18 DIAGNOSIS — I10 ESSENTIAL HYPERTENSION, BENIGN: ICD-10-CM

## 2021-01-18 RX ORDER — HYDROCHLOROTHIAZIDE 25 MG/1
25 TABLET ORAL DAILY
Qty: 90 TABLET | Refills: 3 | Status: SHIPPED | OUTPATIENT
Start: 2021-02-10 | End: 2022-01-20

## 2021-01-18 ASSESSMENT — MIFFLIN-ST. JEOR: SCORE: 1790.79

## 2021-01-18 NOTE — PATIENT INSTRUCTIONS
1. Hypertension on Amlodipine 10mg/day and hydrochlorothiazide 25 mg/day - Doing much better with a small decrease in weight   - Continue medications. Take Amlodipine at bedtime  - Add Magnesium 325mg-400 mg at bedtime. This can lower BP and also reduce constipation  - Encouraged small amount of further weight loss. Goal weight of 190-195lbs  -Continue on high vegetable diet  -cont to monitor BP and let me know if levels rise    2. Borderline hypercholesterolemia. Risk was calculated at 8 in 100. Now, with lower BP is 6 in 100 over the next 10 years  -Discussed but deferred medication at this time.   -Add some Oats to diet. Look into overnight steelcut oat recipe.   -Recheck lipids fasting in about 6-12 months.     3. Lower abdominal pain. Likely some constipation  -Magnesium may help.   -Add extra water.   - Minimize or eliminate soda  - Let me know if no improvement.     Follow up in about 6 months      --Mt Becker MD

## 2021-01-18 NOTE — PROGRESS NOTES
OVERVIEW: Javi Salmeron is a 50 year old male who presents to [facility] today to follow-up on some electronic health record messages regarding his blood pressure and also his cholesterol level and risk for cardiovascular events.      ASSESSMENT/PLAN:    1. Hypertension on Amlodipine 10mg/day and hydrochlorothiazide 25 mg/day - Doing much better with a small decrease in weight   - Continue medications. Take Amlodipine at bedtime  - Add Magnesium 325mg-400 mg at bedtime. This can lower BP and also reduce constipation  - Encouraged small amount of further weight loss. Goal weight of 190-195lbs  -Continue on high vegetable diet  -cont to monitor BP and let me know if levels rise    2. Borderline hypercholesterolemia. Risk was calculated at 8 in 100. Now, with lower BP is 6 in 100 over the next 10 years  -Discussed but deferred medication at this time.   -Add some Oats to diet. Look into overnight steelcut oat recipe.   -Recheck lipids fasting in about 6-12 months.     3. Lower abdominal pain. Likely some constipation  -Magnesium may help.   -Add extra water.   - Minimize or eliminate soda  - Let me know if no improvement.     Follow up in about 6 months    --Mt Becker MD      SUBJECTIVE:   Time has been feeling fairly well but with some slight abdominal discomfort in his lower abdomen over the past few weeks.  He has been taking amlodipine and hydrochlorothiazide for his blood pressure.  He has been taking both of these in the morning.  Has been checking his numbers at home with about 122/85 by his recall.     Wt Readings from Last 5 Encounters:   01/18/21 90.3 kg (199 lb)   12/21/20 93 kg (205 lb)   11/04/20 92.6 kg (204 lb 4 oz)   01/27/20 95.7 kg (211 lb 1.3 oz)   03/20/19 91.4 kg (201 lb 8 oz)     His 10-year cardiovascular risk from his last cholesterol level was at about 8 in 100.  He has since lost a few pounds.  Please see below for a new calculated risk.      Review Of Systems:    Has otherwise been  "in usual state of health, e.g.   Cardiovascular: negative  Respiratory: No shortness of breath, dyspnea on exertion, cough, or hemoptysis  Genitourinary: negative    Problem list per EMR:  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)      Major depression in complete remission (H)       Current Outpatient Medications   Medication Sig Dispense Refill     amLODIPine (NORVASC) 10 MG tablet TAKE 1 TABLET(10 MG) BY MOUTH DAILY 90 tablet 1     citalopram (CELEXA) 40 MG tablet Take 1 tablet (40 mg) by mouth every morning 90 tablet 3     hydrochlorothiazide (HYDRODIURIL) 25 MG tablet Take 1 tablet (25 mg) by mouth daily 30 tablet 1     naltrexone (DEPADE/REVIA) 50 MG tablet Take 1 tablet (50 mg) by mouth every morning 90 tablet 3       Allergies   Allergen Reactions     Dust Mites      Grass      Ragweeds      Codeine Anxiety     \"gets loopy\" - doesn't like how it feels            Social:   No change      OBJECTIVE    Vitals: /75   Pulse 80   Temp 97.1  F (36.2  C)   Resp 13   Ht 1.829 m (6' 0.01\")   Wt 90.3 kg (199 lb)   SpO2 97%   BMI 26.98 kg/m    BMI= Body mass index is 26.98 kg/m .  He appears in his usual state of health.    His abdomen is soft with mild lower abdominal discomfort just below the umbilicus.  No rebound or guarding.  No specific right lower quadrant tenderness.  Normal active bowel sounds.      The 10-year ASCVD risk score (Francoise DC Jr., et al., 2013) is: 6.2%*    Values used to calculate the score:      Age: 50 years      Sex: Male      Is Non- : No      Diabetic: No      Tobacco smoker: No      Systolic Blood Pressure: 122 mmHg      Is BP treated: Yes      HDL Cholesterol: 45 mg/dL*      Total Cholesterol: 234 mg/dL*      * - Cholesterol units were " assumed for this score calculation    SEE TOP OF NOTE FOR ASSESSMENT AND PLAN    --Mt Becker MD  [facility], Department of Family Medicine and Community Health

## 2021-01-18 NOTE — NURSING NOTE
"50 year old  Chief Complaint   Patient presents with     RECHECK     chlesterol and blood pressure check       Blood pressure 122/75, pulse 80, temperature 97.1  F (36.2  C), resp. rate 13, height 1.829 m (6' 0.01\"), weight 90.3 kg (199 lb), SpO2 97 %. Body mass index is 26.98 kg/m .  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)      Major depression in complete remission (H)       Wt Readings from Last 2 Encounters:   01/18/21 90.3 kg (199 lb)   12/21/20 93 kg (205 lb)     BP Readings from Last 3 Encounters:   01/18/21 122/75   12/21/20 (!) 140/88   11/04/20 (!) 141/86         Current Outpatient Medications   Medication     amLODIPine (NORVASC) 10 MG tablet     citalopram (CELEXA) 40 MG tablet     hydrochlorothiazide (HYDRODIURIL) 25 MG tablet     naltrexone (DEPADE/REVIA) 50 MG tablet     No current facility-administered medications for this visit.        Social History     Tobacco Use     Smoking status: Never Smoker     Smokeless tobacco: Never Used   Substance Use Topics     Alcohol use: Yes     Comment: one or two a month     Drug use: No       Health Maintenance Due   Topic Date Due     PREVENTIVE CARE VISIT  1970     DEPRESSION ACTION PLAN  1970     ZOSTER IMMUNIZATION (1 of 2) 10/20/2018     MENTAL HEALTH TX PLAN  09/29/2020     RAGINI ASSESSMENT  01/27/2021     PHQ-9  01/27/2021       No results found for: PAP      January 18, 2021 9:44 AM  "

## 2021-01-20 ASSESSMENT — ANXIETY QUESTIONNAIRES
2. NOT BEING ABLE TO STOP OR CONTROL WORRYING: NOT AT ALL
1. FEELING NERVOUS, ANXIOUS, OR ON EDGE: NOT AT ALL
6. BECOMING EASILY ANNOYED OR IRRITABLE: NOT AT ALL
7. FEELING AFRAID AS IF SOMETHING AWFUL MIGHT HAPPEN: NOT AT ALL
GAD7 TOTAL SCORE: 0
5. BEING SO RESTLESS THAT IT IS HARD TO SIT STILL: NOT AT ALL
3. WORRYING TOO MUCH ABOUT DIFFERENT THINGS: NOT AT ALL

## 2021-01-20 ASSESSMENT — PATIENT HEALTH QUESTIONNAIRE - PHQ9
5. POOR APPETITE OR OVEREATING: NOT AT ALL
SUM OF ALL RESPONSES TO PHQ QUESTIONS 1-9: 1

## 2021-01-21 ASSESSMENT — ANXIETY QUESTIONNAIRES: GAD7 TOTAL SCORE: 0

## 2021-02-01 ENCOUNTER — VIRTUAL VISIT (OUTPATIENT)
Dept: PSYCHOLOGY | Facility: CLINIC | Age: 51
End: 2021-02-01

## 2021-02-01 DIAGNOSIS — Z51.89 AFTERCARE: Primary | ICD-10-CM

## 2021-02-01 PROCEDURE — 99207 PR CSH SUPPORT GROUP 120 MINUTES: CPT | Mod: 95

## 2021-02-01 PROCEDURE — 99207 PR NO CHARGE LOS: CPT

## 2021-02-02 NOTE — PROGRESS NOTES
[facility]  Case Progress Note    2/01/21    Client Name: Javi Salmeron  YOB: 1970  MRN:  [id number]  Treating Provider: Connie Baugh, PhD  Type of Session: Aftercare  Number of Minutes: 120    Health Maintenance Summary - Mental Health Treatment Plan       Status Date      MENTAL HEALTH TX PLAN Overdue 9/29/2020      Done 10/29/2019 HIM MENTAL HEALTH TX PLAN SCAN     Done 10/17/2018      Done 9/6/2017      Done 9/15/2016      Done 9/17/2015 HIM MENTAL HEALTH TX PLAN SCAN     Patient has more history with this topic...      Video start time: 4:30  Video end time: 6:30    Telemedicine Visit: The patient's condition can be safely assessed and treated via synchronous audio and visual telemedicine encounter.      Reason for Telemedicine Visit: Services only offered telehealth - COVID    Originating Site (Patient Location): Patient's home    Distant Site (Provider Location): [facility] Clinics: [facility]    Consent:  The patient/guardian has verbally consented to: the potential risks and benefits of telemedicine (video visit) versus in person care; bill my insurance or make self-payment for services provided; and responsibility for payment of non-covered services.     Mode of Communication:  Video Conference via Zoom    As the provider I attest to compliance with applicable laws and regulations related to telemedicine.    Current Symptoms/Status:  Has dating and this has been challenging for him.  Not having issues with CSB.    Some distress about his health - uncontrolled blood pressure.    Progress Toward Treatment Goals:   Using support system.       Intervention: Modality and Description:  Aftercare support group    Response to Intervention:  Concerned about health.  High blood pressure  Working with physician about that.    Work is going well.  Got a raise.    Resolved things with ex-wife.  Stressful but also using support system.  Using maintenance plan    Assignment:  Stay within  boundaries.       Diagnosis:  Aftercare    Plan / Need for Future Services:  Continue individual therapy 1X a month.  Monthly aftercare support group.

## 2021-04-05 ENCOUNTER — VIRTUAL VISIT (OUTPATIENT)
Dept: PSYCHOLOGY | Facility: CLINIC | Age: 51
End: 2021-04-05
Payer: COMMERCIAL

## 2021-04-05 ENCOUNTER — VIRTUAL VISIT (OUTPATIENT)
Dept: PSYCHOLOGY | Facility: CLINIC | Age: 51
End: 2021-04-05

## 2021-04-05 DIAGNOSIS — F34.1 DYSTHYMIC DISORDER: ICD-10-CM

## 2021-04-05 DIAGNOSIS — Z51.89 AFTERCARE: Primary | ICD-10-CM

## 2021-04-05 DIAGNOSIS — F91.8 CONDUCT DISORDER, UNDIFFERENTIATED TYPE: Primary | ICD-10-CM

## 2021-04-05 PROCEDURE — 99207 PR NO CHARGE LOS: CPT

## 2021-04-05 PROCEDURE — 90837 PSYTX W PT 60 MINUTES: CPT | Mod: 59 | Performed by: PSYCHOLOGIST

## 2021-04-05 PROCEDURE — 99207 PR NO CHARGE LOS: CPT | Performed by: PSYCHOLOGIST

## 2021-04-05 PROCEDURE — 99207 PR CSH SUPPORT GROUP 120 MINUTES: CPT | Mod: 95

## 2021-04-05 NOTE — PROGRESS NOTES
[facility]  Case Progress Note    4/05/21    Client Name: Javi Salmeron  YOB: 1970  MRN:  [id number]  Treating Provider: Connie Baugh, PhD  Type of Session: Individual  Number of Minutes: 55    Health Maintenance Summary - Mental Health Treatment Plan       Status Date      MENTAL HEALTH TX PLAN Overdue 9/29/2020      Done 10/29/2019 HIM MENTAL HEALTH TX PLAN SCAN     Done 10/17/2018      Done 9/6/2017      Done 9/15/2016      Done 9/17/2015 HIM MENTAL HEALTH TX PLAN SCAN     Patient has more history with this topic...      Video start time: 3:02  Video end time: 3:57    Telemedicine Visit: The patient's condition can be safely assessed and treated via synchronous audio and visual telemedicine encounter.      Reason for Telemedicine Visit: Services only offered telehealth - COVID    Originating Site (Patient Location): Patient's home    Distant Site (Provider Location): [facility]: [facility]    Consent:  The patient/guardian has verbally consented to: the potential risks and benefits of telemedicine (video visit) versus in person care; bill my insurance or make self-payment for services provided; and responsibility for payment of non-covered services.     Mode of Communication:  Video Conference via Doxy    As the provider I attest to compliance with applicable laws and regulations related to telemedicine.        Current Symptoms/Status:  Not having issues with CSB.  Developing intimate relationships.    Progress Toward Treatment Goals:   Using support system.       Intervention: Modality and Description:  Individual, interpersonal, CBT    Response to Intervention:  Met someone that he is feeling good about.  Healthy communication, intimacy.    Feeling good about himself.  In positive cycle.    Assignment:  Stay within boundaries.   Continue to work on health intimacy.    Diagnosis:  300.4 Dysthymia  312.89 (F91.8) Other Specified Disruptive, Impulse Control, and Conduct Disorder  (Hypersexual Disorder)      Plan / Need for Future Services:  Continue individual therapy 1X every 4-6 months.  Monthly aftercare support group.     TREATMENT PLAN  Date of Treatment Plan 21  Name: Javi ANAND: 1970  Medical Record Number: [id number]  Treating Provider: Connie Baugh, PhD  Type of Session: Individual  Present in Session: Patient.    Current Status:  Depression/Mood:  Reports no problems or symptoms at this time    Anxiety/Panic:  Reports no problems or symptoms at this time    Thought:   Reports no problems or symptoms at this time    Sensorium:  Reports no problems or symptoms at this time    Behavior/Health:  Reports no problems or symptoms at this time    Chemical Use:  Denies both Alcohol and Drug Abuse    Sexual Problems:  Hx of compulsive sexual behavior.    Suicide Risk Assessment:  Assessed Level of Immediate Risk:  YES  Ideation:  NO  Plan:  NO  Means:  NO  Intent:  NO    Homicide Risk Assessment:  Assessed Level of Immediate Risk:  YES  Ideation:  NO  Plan:  NO  Means:  NO  Intent:  NO    Impact of Symptoms on Function:  No Current Impact of Symptoms on Function    DSM-5 Diagnoses:   Diagnoses       Codes Comments    Conduct disorder, undifferentiated type    -  Primary F91.8     Dysthymic disorder     F34.1           Screening Questionnaires:  Please indicate whether the following screening questionnaires have been completed:  CAGE: No  PHQ-9: No    RAGINI-7: No  Safety Screening: No  WHODAS: No    Problem(s):  1. Dysthymia  2. Hx of compulsive sexual behavior.      Short-Long Term Goals  Increase Coping  Monitor Psych Status  Enhance Relationships    Interventions  [place] [facility]  Cognitive-Behavioral Therapy  Aftercare support group    Expected Outcomes and Prognosis  Expected improvement    Anticipated Treatment Duration:  Unknown    Frequency of Sessions  1X 4-6 months  Monthly aftercare support kacyuishruthi    Progress Update (for Plan Update Only)  Compliant, Progressing  and Improving - Needs More Sessions    Current Psychoactive Medications:  See Psychiatric Treatment Plan  Discharge & Aftercare Goals: To Be Determined.  Care Coordination: No    Consent to Treatment:   Patient participated in this treatment planning process and indicated verbal agreement with the above treatment plan.  If patient doesn't sign, indicate why: Telehealth visit due to COVID19 pandemic    Patient Signature: Javi Salmeron  Date: 4-5-21    Provider Signature: Connie Baugh, PhD  Date: 4-5-21

## 2021-04-05 NOTE — PROGRESS NOTES
[facility]  Case Progress Note    4/05/21    Client Name: Javi Salmeron  YOB: 1970  MRN:  [id number]  Treating Provider: Connie Baugh, PhD  Type of Session: Aftercare  Number of Minutes: 120    Health Maintenance Summary - Mental Health Treatment Plan       Status Date      MENTAL HEALTH TX PLAN Overdue 9/29/2020      Done 10/29/2019 HIM MENTAL HEALTH TX PLAN SCAN     Done 10/17/2018      Done 9/6/2017      Done 9/15/2016      Done 9/17/2015 HIM MENTAL HEALTH TX PLAN SCAN     Patient has more history with this topic...      Video start time: 4:30  Video end time: 6:30    Telemedicine Visit: The patient's condition can be safely assessed and treated via synchronous audio and visual telemedicine encounter.      Reason for Telemedicine Visit: Services only offered telehealth - COVID    Originating Site (Patient Location): Patient's home    Distant Site (Provider Location): [facility]: [facility]    Consent:  The patient/guardian has verbally consented to: the potential risks and benefits of telemedicine (video visit) versus in person care; bill my insurance or make self-payment for services provided; and responsibility for payment of non-covered services.     Mode of Communication:  Video Conference via Zoom    As the provider I attest to compliance with applicable laws and regulations related to telemedicine.    Current Symptoms/Status:  Has been dating. Not having issues with CSB.    Some distress about his health - uncontrolled blood pressure.    Progress Toward Treatment Goals:   Using support system.       Intervention: Modality and Description:  Aftercare support group    Response to Intervention:  Concerned about health.  High blood pressure  Working with physician about that.    Work is going well.  But fear of lay offs.  Got a raise.    Been dating a woman for 9 weeks.  Feeling very good about this.    Using maintenance plan    Assignment:  Stay within boundaries.        Diagnosis:  Aftercare    Plan / Need for Future Services:  Continue individual therapy 1X a month.  Monthly aftercare support group.

## 2021-04-11 ENCOUNTER — HEALTH MAINTENANCE LETTER (OUTPATIENT)
Age: 51
End: 2021-04-11

## 2021-05-03 ENCOUNTER — VIRTUAL VISIT (OUTPATIENT)
Dept: PSYCHOLOGY | Facility: CLINIC | Age: 51
End: 2021-05-03

## 2021-05-03 DIAGNOSIS — Z51.89 AFTERCARE: Primary | ICD-10-CM

## 2021-05-03 PROCEDURE — 99207 PR CSH SUPPORT GROUP 120 MINUTES: CPT | Mod: 95

## 2021-05-03 PROCEDURE — 99207 PR NO CHARGE LOS: CPT

## 2021-05-03 NOTE — PROGRESS NOTES
[facility]  Case Progress Note    5/03/21    Client Name: Javi Salmeron  YOB: 1970  MRN:  [id number]  Treating Provider: Connie Baugh, PhD  Type of Session: Aftercare  Number of Minutes: 120    Health Maintenance Summary - Mental Health Treatment Plan       Status Date      MENTAL HEALTH TX PLAN Next Due 3/5/2022      Done 4/5/2021      Done 10/29/2019 HIM MENTAL HEALTH TX PLAN SCAN     Done 10/17/2018      Done 9/6/2017      Done 9/15/2016      Patient has more history with this topic...      Video start time: 4:30  Video end time: 6:30    Telemedicine Visit: The patient's condition can be safely assessed and treated via synchronous audio and visual telemedicine encounter.      Reason for Telemedicine Visit: Services only offered telehealth - COVID    Originating Site (Patient Location): Patient's home    Distant Site (Provider Location): [facility] Clinics: [facility]    Consent:  The patient/guardian has verbally consented to: the potential risks and benefits of telemedicine (video visit) versus in person care; bill my insurance or make self-payment for services provided; and responsibility for payment of non-covered services.     Mode of Communication:  Video Conference via Zoom    As the provider I attest to compliance with applicable laws and regulations related to telemedicine.    Current Symptoms/Status:  Distressed about loss of his bio dad over political controverery.    Progress Toward Treatment Goals:   Using support system.       Intervention: Modality and Description:  Aftercare support group    Response to Intervention:  Processed recent events related to bio-dad.  Feels loss and activate his core negative fuels.    Received good feedback and felt good about the intervention.      Work is still stressful.  Fear of lay offs.    Using maintenance plan    Assignment:  Stay within boundaries.       Diagnosis:  Aftercare    Plan / Need for Future Services:  Continue individual  therapy 1X a month.  Monthly aftercare support group.

## 2021-05-24 ENCOUNTER — VIRTUAL VISIT (OUTPATIENT)
Dept: PSYCHOLOGY | Facility: CLINIC | Age: 51
End: 2021-05-24
Payer: COMMERCIAL

## 2021-05-24 DIAGNOSIS — F91.8 CONDUCT DISORDER, UNDIFFERENTIATED TYPE: ICD-10-CM

## 2021-05-24 DIAGNOSIS — F34.1 DYSTHYMIC DISORDER: Primary | ICD-10-CM

## 2021-05-24 PROCEDURE — 90834 PSYTX W PT 45 MINUTES: CPT | Mod: GT | Performed by: PSYCHOLOGIST

## 2021-05-24 NOTE — PROGRESS NOTES
[facility]  Case Progress Note    5/24/21    Client Name: Javi Salmeron  YOB: 1970  MRN:  [id number]   Treating Provider: Connie Baugh, PhD  Type of Session: Individual  Number of Minutes: 45    Health Maintenance Summary - Mental Health Treatment Plan       Status Date      MENTAL HEALTH TX PLAN Next Due 3/5/2022      Done 4/5/2021      Done 10/29/2019 HIM MENTAL HEALTH TX PLAN SCAN     Done 10/17/2018      Done 9/6/2017      Done 9/15/2016      Patient has more history with this topic...      Video start time: 4:10  Video end time: 4:55    Telemedicine Visit: The patient's condition can be safely assessed and treated via synchronous audio and visual telemedicine encounter.      Reason for Telemedicine Visit: Services only offered telehealth - COVID    Originating Site (Patient Location): Patient's home    Distant Site (Provider Location): [facility]: [facility]    Consent:  The patient/guardian has verbally consented to: the potential risks and benefits of telemedicine (video visit) versus in person care; bill my insurance or make self-payment for services provided; and responsibility for payment of non-covered services.     Mode of Communication:  Video Conference via Doxy    As the provider I attest to compliance with applicable laws and regulations related to telemedicine.      Current Symptoms/Status:  Not having issues with CSB.  Developing intimate relationships.  Some serious grief regarding to his biological father.    Progress Toward Treatment Goals:   Using support system.       Intervention: Modality and Description:  Individual, interpersonal, CBT    Response to Intervention:  Was triggered by biodad rejecting him.  Wrote a nice letter to them.  Took the high road.    Triggered his insecurity.  Continues to date woman (about 4 months)  Healthy communication, intimacy.  Expressed his love to her.  She got a little spooked.  Decided to slow down.    Feeling good about  himself.  In positive cycle.    Assignment:  Stay within boundaries.   Continue to work on health intimacy.    Diagnosis:  300.4 Dysthymia  312.89 (F91.8) Other Specified Disruptive, Impulse Control, and Conduct Disorder (Hypersexual Disorder)      Plan / Need for Future Services:  Continue individual therapy 1X every 4-6 months.  Monthly aftercare support group.     TREATMENT PLAN  Date of Treatment Plan 21  Name: Javi Salmeron  : 1970  Medical Record Number: [id number]  Treating Provider: Connie Baugh, PhD  Type of Session: Individual  Present in Session: Patient.    Current Status:  Depression/Mood:  Reports no problems or symptoms at this time    Anxiety/Panic:  Reports no problems or symptoms at this time    Thought:   Reports no problems or symptoms at this time    Sensorium:  Reports no problems or symptoms at this time    Behavior/Health:  Reports no problems or symptoms at this time    Chemical Use:  Denies both Alcohol and Drug Abuse    Sexual Problems:  Hx of compulsive sexual behavior.    Suicide Risk Assessment:  Assessed Level of Immediate Risk:  YES  Ideation:  NO  Plan:  NO  Means:  NO  Intent:  NO    Homicide Risk Assessment:  Assessed Level of Immediate Risk:  YES  Ideation:  NO  Plan:  NO  Means:  NO  Intent:  NO    Impact of Symptoms on Function:  No Current Impact of Symptoms on Function    DSM-5 Diagnoses:   Diagnoses       Codes Comments    Conduct disorder, undifferentiated type    -  Primary F91.8     Dysthymic disorder     F34.1           Screening Questionnaires:  Please indicate whether the following screening questionnaires have been completed:  CAGE: No  PHQ-9: No    RAGINI-7: No  Safety Screening: No  WHODAS: No    Problem(s):  1. Dysthymia  2. Hx of compulsive sexual behavior.      Short-Long Term Goals  Increase Coping  Monitor Psych Status  Enhance Relationships    Interventions  [place] [facility]  Cognitive-Behavioral Therapy  Aftercare support group    Expected  Outcomes and Prognosis  Expected improvement    Anticipated Treatment Duration:  Unknown    Frequency of Sessions  1X 4-6 months  Monthly aftercare support grouip    Progress Update (for Plan Update Only)  Compliant, Progressing and Improving - Needs More Sessions    Current Psychoactive Medications:  See Psychiatric Treatment Plan  Discharge & Aftercare Goals: To Be Determined.  Care Coordination: No    Consent to Treatment:   Patient participated in this treatment planning process and indicated verbal agreement with the above treatment plan.  If patient doesn't sign, indicate why: Telehealth visit due to COVID19 pandemic    Patient Signature: Javi Salmeron  Date: 4-5-21    Provider Signature: Connie Baugh, PhD  Date: 4-5-21

## 2021-06-07 ENCOUNTER — VIRTUAL VISIT (OUTPATIENT)
Dept: PSYCHOLOGY | Facility: CLINIC | Age: 51
End: 2021-06-07

## 2021-06-07 DIAGNOSIS — Z51.89 AFTERCARE: Primary | ICD-10-CM

## 2021-06-07 PROCEDURE — 99207 PR CSH SUPPORT GROUP 120 MINUTES: CPT | Mod: 95

## 2021-06-07 PROCEDURE — 99207 PR NO CHARGE LOS: CPT

## 2021-06-07 NOTE — PROGRESS NOTES
[facility]  Case Progress Note    6/07/21    Client Name: Javi Salmeron  YOB: 1970  MRN:  [id number]  Treating Provider: Connie Baugh, PhD  Type of Session: Aftercare  Number of Minutes: 120    Health Maintenance Summary - Mental Health Treatment Plan       Status Date      MENTAL HEALTH TX PLAN Next Due 3/5/2022      Done 4/5/2021      Done 10/29/2019 HIM MENTAL HEALTH TX PLAN SCAN     Done 10/17/2018      Done 9/6/2017      Done 9/15/2016      Patient has more history with this topic...      Video start time: 4:30  Video end time: 6:30    Telemedicine Visit: The patient's condition can be safely assessed and treated via synchronous audio and visual telemedicine encounter.      Reason for Telemedicine Visit: Services only offered telehealth - COVID    Originating Site (Patient Location): Patient's home    Distant Site (Provider Location): [facility]: [facility]    Consent:  The patient/guardian has verbally consented to: the potential risks and benefits of telemedicine (video visit) versus in person care; bill my insurance or make self-payment for services provided; and responsibility for payment of non-covered services.     Mode of Communication:  Video Conference via Zoom    As the provider I attest to compliance with applicable laws and regulations related to telemedicine.    Current Symptoms/Status:  Still distressed about loss of his bio dad over political controverery.    Progress Toward Treatment Goals:   Using support system.       Intervention: Modality and Description:  Aftercare support group    Response to Intervention:  Processed recent events related to bio-dad.  There has been some kind of rapproachment - but ignores the rupture.  Received good feedback and felt good about the intervention.    Work is still stressful.  Fear of lay offs.    Using maintenance plan    Assignment:  Stay within boundaries.       Diagnosis:  Aftercare    Plan / Need for Future  Services:  Continue individual therapy 1X a month.  Monthly aftercare support group.        78

## 2021-07-16 DIAGNOSIS — F32.A DEPRESSION, UNSPECIFIED DEPRESSION TYPE: ICD-10-CM

## 2021-07-16 RX ORDER — CITALOPRAM HYDROBROMIDE 40 MG/1
40 TABLET ORAL EVERY MORNING
Qty: 90 TABLET | Refills: 0 | Status: SHIPPED | OUTPATIENT
Start: 2021-07-16 | End: 2021-10-18

## 2021-07-16 NOTE — TELEPHONE ENCOUNTER
Last Office Visit: 1/18/21  [facility] [place]  Medication last refilled: 6/3/20 #90 + 3 refills  PHQ-9 SCORE 1/20/2021   PHQ-9 Total Score 1     RAGINI-7 SCORE 1/20/2021   Total Score 0     Jia Christina MS RN-BC  07/16/21  9:18 AM

## 2021-08-02 ENCOUNTER — VIRTUAL VISIT (OUTPATIENT)
Dept: PSYCHOLOGY | Facility: CLINIC | Age: 51
End: 2021-08-02

## 2021-08-02 DIAGNOSIS — Z51.89 AFTERCARE: Primary | ICD-10-CM

## 2021-08-02 PROCEDURE — 99207 PR NO CHARGE LOS: CPT

## 2021-08-02 PROCEDURE — 99207 PR CSH SUPPORT GROUP 120 MINUTES: CPT | Mod: 95

## 2021-09-26 ENCOUNTER — HEALTH MAINTENANCE LETTER (OUTPATIENT)
Age: 51
End: 2021-09-26

## 2021-09-28 DIAGNOSIS — F42.9 OBSESSIVE-COMPULSIVE DISORDER, UNSPECIFIED TYPE: ICD-10-CM

## 2021-09-29 RX ORDER — NALTREXONE HYDROCHLORIDE 50 MG/1
50 TABLET, FILM COATED ORAL EVERY MORNING
Qty: 30 TABLET | Refills: 0 | Status: SHIPPED | OUTPATIENT
Start: 2021-09-29 | End: 2021-11-11

## 2021-09-29 NOTE — TELEPHONE ENCOUNTER
Naltrexone (Depade/Revia) 50 mg    Last Office Visit: 1/18/21  [place] [facility] [place]  Medication last refilled: 6/3/20 #90 with 3 refill(s)    Medication is being filled for 1 time refill only due to:  Has read [employer] message sent in July to call and schedule and has not scheduled.      [employer] message sent to patient to call and schedule appointment on 7/20/21 and he has not scheduled.  Will ask the  staff to call him to make an appointment.    Francia Lopez, RN, BSN

## 2021-09-30 ENCOUNTER — TELEPHONE (OUTPATIENT)
Dept: FAMILY MEDICINE | Facility: CLINIC | Age: 51
End: 2021-09-30

## 2021-09-30 DIAGNOSIS — I10 ESSENTIAL HYPERTENSION, BENIGN: ICD-10-CM

## 2021-09-30 RX ORDER — AMLODIPINE BESYLATE 10 MG/1
TABLET ORAL
Qty: 30 TABLET | Refills: 0 | Status: SHIPPED | OUTPATIENT
Start: 2021-09-30 | End: 2021-10-28

## 2021-09-30 RX ORDER — AMLODIPINE BESYLATE 10 MG/1
TABLET ORAL
Qty: 90 TABLET | OUTPATIENT
Start: 2021-09-30

## 2021-09-30 NOTE — TELEPHONE ENCOUNTER
An order for amlodipine was already placed 9/30/21 for #30 tabs. Patient due for appointment.  Jia Christian MS RN-BC  09/30/21  1:28 PM

## 2021-09-30 NOTE — TELEPHONE ENCOUNTER
----- Message from Francia Lopez RN sent at 9/29/2021  4:12 PM CDT -----  Regarding: Please call to schedule BP follow up with Amarilis Gonzales sent Javi a [employer] message to call in and schedule his follow up which he read and did not schedule.  Please call him this time and document your call in the EMR.  Thank you,  WERO Feldman

## 2021-09-30 NOTE — TELEPHONE ENCOUNTER
Medication requested: amLODIPine (NORVASC) 10 MG tablet  Last office visit: 1/18/21  [facility] appointments: none  Medication last refilled: 12/22/20 #90 + 1 refill  Last qualifying labs: 12/31/20    PATIENT DUE IN CLINIC FOR BP CHECK, RECHECK LIPIDS    Medication is being filled for 1 time refill only due to:  Patient needs to be seen because blood pressure management and needs repeat cholesterol labs. [employer] message sent to patient to schedule an in-clinic visit. .   Jia Christian MS RN-BC  09/30/21  12:13 PM

## 2021-10-04 ENCOUNTER — VIRTUAL VISIT (OUTPATIENT)
Dept: PSYCHOLOGY | Facility: CLINIC | Age: 51
End: 2021-10-04

## 2021-10-04 DIAGNOSIS — Z51.89 AFTERCARE: Primary | ICD-10-CM

## 2021-10-04 PROCEDURE — 99207 PR NO CHARGE LOS: CPT

## 2021-10-04 PROCEDURE — 99207 PR CSH SUPPORT GROUP 120 MINUTES: CPT | Mod: 95

## 2021-10-04 NOTE — PROGRESS NOTES
[facility]  Case Progress Note    10/04/21    Client Name: Javi Salmeron  YOB: 1970  MRN:  [id number]  Treating Provider: Connie Baugh, PhD  Type of Session: Aftercare  Number of Minutes: 120    Health Maintenance Summary - Mental Health Treatment Plan       Status Date      MENTAL HEALTH TX PLAN Next Due 3/5/2022      Done 4/5/2021      Done 10/29/2019 HIM MENTAL HEALTH TX PLAN SCAN     Done 10/17/2018      Done 9/6/2017      Done 9/15/2016      Patient has more history with this topic...      Video start time: 4:30  Video end time: 6:30    Telemedicine Visit: The patient's condition can be safely assessed and treated via synchronous audio and visual telemedicine encounter.      Reason for Telemedicine Visit: Services only offered telehealth - COVID    Originating Site (Patient Location): Patient's home    Distant Site (Provider Location): [facility]: [facility]    Consent:  The patient/guardian has verbally consented to: the potential risks and benefits of telemedicine (video visit) versus in person care; bill my insurance or make self-payment for services provided; and responsibility for payment of non-covered services.     Mode of Communication:  Video Conference via Zoom    As the provider I attest to compliance with applicable laws and regulations related to telemedicine.    Current Symptoms/Status:  Staying within boundaries.  Stable.    Progress Toward Treatment Goals:   Using support system.       Intervention: Modality and Description:  Aftercare support group    Response to Intervention:  Processed recent events related girlfriend.  Feeling frustrated that she is not more committed.     Has been applying for new jobs - that feels good.    Active participant.    Using maintenance plan    Assignment:  Stay within boundaries.       Diagnosis:  Aftercare    Plan / Need for Future Services:  Continue individual therapy 1X a month.  Monthly aftercare support group.      TREATMENT PLAN  Date of Treatment Plan 10-4-21  Name: Javi Salmeron  : 49  Medical Record Number: [id number]  Treating Provider: Connie Baugh, PhD        Current Status:  Depression/Mood:  Somewhat discouraged.  Low self esteem     Anxiety/Panic:  Anxious, worry     Thought:   Reports no problems or symptoms at this time     Sensorium:  Reports no problems or symptoms at this time     Behavior/Health:  Reports no problems or symptoms at this time     Chemical Use:  Denies both Alcohol and Drug Abuse     Sexual Problems:  History of CSB     Suicide Risk Assessment:  Assessed Level of Immediate Risk:  NOT REVIEWED  Ideation:  NOT APPLICABLE  Plan:  NOT APPLICABLE  Means:  NOT APPLICABLE  Intent:  NOT APPLICABLE     Homicide Risk Assessment:  Assessed Level of Immediate Risk:  NOT REVIEWED  Ideation:  NOT APPLICABLE  Plan:  NOT APPLICABLE  Means:  NOT APPLICABLE  Intent:  NOT APPLICABLE     Impact of Symptoms on Function:  No Current Impact of Symptoms on Function     DSM-5 Diagnoses:   Dysthymia  Aftercare        Screening Questionnaires:  Please indicate whether the following screening questionnaires have been completed:  CAGE: No  PHQ-9: No    RAGINI-7: No  WHODAS: No     Problem(s):  1. History of CSB  2.  Dysthmia        Short-Long Term Goals  Increase Coping     Interventions  Aftercare support group     Expected Outcomes and Prognosis  Maintain current status / Prevent deterioration     Anticipated Treatment Duration:  Unknown     Frequency of Sessions  Monthly     Progress Update (for Plan Update Only)  Compliant, Progressing and Improving - Needs More Sessions     Current Psychoactive Medications:  Not Applicable  Discharge & Aftercare Goals: To Be Determined.  Care Coordination: No     Consent to Treatment:   Patient participated in this treatment planning process and indicated verbal agreement with the above treatment plan.  If patient doesn't sign, indicate why: Telehealth visit due to COVID19  pandemic     Javi Salmeron  10-4-21     Connie Baugh, PhD  10-4-21

## 2021-10-16 DIAGNOSIS — F32.A DEPRESSION, UNSPECIFIED DEPRESSION TYPE: ICD-10-CM

## 2021-10-18 RX ORDER — CITALOPRAM HYDROBROMIDE 40 MG/1
TABLET ORAL
Qty: 30 TABLET | Refills: 0 | Status: SHIPPED | OUTPATIENT
Start: 2021-10-18 | End: 2021-11-15

## 2021-10-18 NOTE — TELEPHONE ENCOUNTER
Medication requested: CITALOPRAM  Last office visit: 1/18/21   [facility] appointments: NONE  Medication last refilled: 7/16/21  Last qualifying labs:     RAGINI-7 SCORE 1/20/2021   Total Score 0   PHQ-9 SCORE 1/20/2021   PHQ-9 Total Score    PHQ-9 Total Score    PHQ-9 Total Score 1     Medication is being filled for 1 time refill only due to:  Patient needs to be seen because DUE FOR MENTAL HEALTH ASSESS; LABS; BP MANAGEMENT.     Jia Christian MS RN-BC  10/18/21  12:27 PM

## 2021-10-19 PROBLEM — F32.9 MAJOR DEPRESSION: Status: ACTIVE | Noted: 2018-10-21

## 2021-10-27 DIAGNOSIS — I10 ESSENTIAL HYPERTENSION, BENIGN: ICD-10-CM

## 2021-10-28 NOTE — TELEPHONE ENCOUNTER
Last Office Visit: 1/18/21  [place] [facility]  Medication last refilled: 9/30/21 for # 30, 0 refills     Routing refill request to provider for review/approval because:  Larissa given x1 and patient did not follow up, please advise  Jia sent [employer] message to pt last month  ( he has read) that you want to see pt in clinic for BP monitoring, and also got message about his citalopram from [facility]i last week needs appt for that med refill     No appt made yet.    How would you like to proceed. ?    Maryellen Montiel RN  October 28, 2021 2:57 PM        I sent in an Rx for another 60 tabs. Can you ask Javi to set up a visit, ideally a 40 minute annual exam. Thanks,   --Shon    Routing comment           Called pt , we have made a 40 minute appt now for Monday Nov 15th at 10 am for annual check in   ( depression and BP f/u)    Maryellen Montiel RN  October 29, 2021 11:45 AM                  
No

## 2021-10-29 DIAGNOSIS — I10 ESSENTIAL HYPERTENSION, BENIGN: ICD-10-CM

## 2021-10-29 RX ORDER — AMLODIPINE BESYLATE 10 MG/1
TABLET ORAL
Qty: 90 TABLET | OUTPATIENT
Start: 2021-10-29

## 2021-10-29 RX ORDER — AMLODIPINE BESYLATE 10 MG/1
TABLET ORAL
Qty: 60 TABLET | Refills: 0 | Status: SHIPPED | OUTPATIENT
Start: 2021-10-29 | End: 2022-07-06

## 2021-10-29 NOTE — TELEPHONE ENCOUNTER
Pt asking for 90 day supply  MD molina, pt needs appt     Called pt and an appt has been made for Nov 15th at 10 am  With Rosita for f/u BP and depression    Maryellen Montiel RN  October 29, 2021 11:30 AM        '

## 2021-11-01 ENCOUNTER — VIRTUAL VISIT (OUTPATIENT)
Dept: PSYCHOLOGY | Facility: CLINIC | Age: 51
End: 2021-11-01

## 2021-11-01 DIAGNOSIS — Z51.89 AFTERCARE: Primary | ICD-10-CM

## 2021-11-01 PROCEDURE — 99207 PR NO CHARGE LOS: CPT

## 2021-11-01 PROCEDURE — 99207 PR CSH SUPPORT GROUP 120 MINUTES: CPT | Mod: 95

## 2021-11-01 NOTE — PROGRESS NOTES
[facility]  Case Progress Note    21    Client Name: Javi Salmeron  YOB: 1970  MRN:  [id number]  Treating Provider: Connie Baugh PhD  Type of Session: Aftercare  Number of Minutes: 120    Health Maintenance Summary - Mental Health Treatment Plan     This patient has no relevant Health Maintenance data.      Video start time: 4:30  Video end time: 6:30    Telemedicine Visit: The patient's condition can be safely assessed and treated via synchronous audio and visual telemedicine encounter.      Reason for Telemedicine Visit: Services only offered telehealth - COVID    Originating Site (Patient Location): Patient's home    Distant Site (Provider Location): [facility]: [facility]    Consent:  The patient/guardian has verbally consented to: the potential risks and benefits of telemedicine (video visit) versus in person care; bill my insurance or make self-payment for services provided; and responsibility for payment of non-covered services.     Mode of Communication:  Video Conference via Zoom    As the provider I attest to compliance with applicable laws and regulations related to telemedicine.    Current Symptoms/Status:  Staying within boundaries.  Stable.    Progress Toward Treatment Goals:   Using support system.       Intervention: Modality and Description:  Aftercare support group    Response to Intervention:       Active participant.  Did not take time.    Using maintenance plan    Assignment:  Stay within boundaries.       Diagnosis:  Aftercare    Plan / Need for Future Services:  Continue individual therapy 1X a month.  Monthly aftercare support group.     TREATMENT PLAN  Date of Treatment Plan 10-4-21  Name: Javi Salmeron  : 49  Medical Record Number: [id number]  Treating Provider: Connie Baugh, PhD        Current Status:  Depression/Mood:  Somewhat discouraged.  Low self esteem     Anxiety/Panic:  Anxious, worry     Thought:   Reports no problems or symptoms at  this time     Sensorium:  Reports no problems or symptoms at this time     Behavior/Health:  Reports no problems or symptoms at this time     Chemical Use:  Denies both Alcohol and Drug Abuse     Sexual Problems:  History of CSB     Suicide Risk Assessment:  Assessed Level of Immediate Risk:  NOT REVIEWED  Ideation:  NOT APPLICABLE  Plan:  NOT APPLICABLE  Means:  NOT APPLICABLE  Intent:  NOT APPLICABLE     Homicide Risk Assessment:  Assessed Level of Immediate Risk:  NOT REVIEWED  Ideation:  NOT APPLICABLE  Plan:  NOT APPLICABLE  Means:  NOT APPLICABLE  Intent:  NOT APPLICABLE     Impact of Symptoms on Function:  No Current Impact of Symptoms on Function     DSM-5 Diagnoses:   Dysthymia  Aftercare        Screening Questionnaires:  Please indicate whether the following screening questionnaires have been completed:  CAGE: No  PHQ-9: No    RAGINI-7: No  WHODAS: No     Problem(s):  1. History of CSB  2.  Dysthmia        Short-Long Term Goals  Increase Coping     Interventions  Aftercare support group     Expected Outcomes and Prognosis  Maintain current status / Prevent deterioration     Anticipated Treatment Duration:  Unknown     Frequency of Sessions  Monthly     Progress Update (for Plan Update Only)  Compliant, Progressing and Improving - Needs More Sessions     Current Psychoactive Medications:  Not Applicable  Discharge & Aftercare Goals: To Be Determined.  Care Coordination: No     Consent to Treatment:   Patient participated in this treatment planning process and indicated verbal agreement with the above treatment plan.  If patient doesn't sign, indicate why: Telehealth visit due to COVID19 pandemic     Javi Salmeron  10-4-21     Connie Baugh, PhD  10-4-21

## 2021-11-11 DIAGNOSIS — F42.9 OBSESSIVE-COMPULSIVE DISORDER, UNSPECIFIED TYPE: ICD-10-CM

## 2021-11-11 RX ORDER — NALTREXONE HYDROCHLORIDE 50 MG/1
TABLET, FILM COATED ORAL
Qty: 30 TABLET | Refills: 0 | Status: SHIPPED | OUTPATIENT
Start: 2021-11-11 | End: 2021-11-15

## 2021-11-11 NOTE — TELEPHONE ENCOUNTER
Naltrexone 50 mg    Last Office Visit: 1/18/21  Future [facility] Appointments: 11/15/21  Medication last refilled: 9/29/21 #30 with 0 refill(s)    Prescription approved per [facility] Refill Protocol.    Francia Lopez RN, BSN

## 2021-11-15 ENCOUNTER — OFFICE VISIT (OUTPATIENT)
Dept: FAMILY MEDICINE | Facility: CLINIC | Age: 51
End: 2021-11-15
Payer: COMMERCIAL

## 2021-11-15 VITALS
OXYGEN SATURATION: 97 % | DIASTOLIC BLOOD PRESSURE: 87 MMHG | TEMPERATURE: 97 F | SYSTOLIC BLOOD PRESSURE: 141 MMHG | WEIGHT: 194.04 LBS | BODY MASS INDEX: 26.28 KG/M2 | HEIGHT: 72 IN | HEART RATE: 82 BPM

## 2021-11-15 DIAGNOSIS — I10 ESSENTIAL HYPERTENSION, BENIGN: ICD-10-CM

## 2021-11-15 DIAGNOSIS — F32.A DEPRESSION, UNSPECIFIED DEPRESSION TYPE: ICD-10-CM

## 2021-11-15 DIAGNOSIS — F42.9 OBSESSIVE-COMPULSIVE DISORDER, UNSPECIFIED TYPE: ICD-10-CM

## 2021-11-15 DIAGNOSIS — Z86.0100 HISTORY OF COLONIC POLYPS: ICD-10-CM

## 2021-11-15 DIAGNOSIS — Z00.00 ANNUAL PHYSICAL EXAM: Primary | ICD-10-CM

## 2021-11-15 DIAGNOSIS — Z23 FLU VACCINE NEED: ICD-10-CM

## 2021-11-15 DIAGNOSIS — E78.5 HYPERLIPIDEMIA LDL GOAL <100: ICD-10-CM

## 2021-11-15 DIAGNOSIS — R73.09 ELEVATED GLUCOSE: ICD-10-CM

## 2021-11-15 LAB
ALBUMIN SERPL-MCNC: 4.1 G/DL (ref 3.4–5)
ALP SERPL-CCNC: 59 U/L (ref 40–150)
ALT SERPL W P-5'-P-CCNC: 26 U/L (ref 0–70)
ANION GAP SERPL CALCULATED.3IONS-SCNC: 8 MMOL/L (ref 3–14)
AST SERPL W P-5'-P-CCNC: 13 U/L (ref 0–45)
BILIRUB SERPL-MCNC: 0.5 MG/DL (ref 0.2–1.3)
BUN SERPL-MCNC: 23 MG/DL (ref 7–30)
CALCIUM SERPL-MCNC: 9.4 MG/DL (ref 8.5–10.1)
CHLORIDE BLD-SCNC: 99 MMOL/L (ref 94–109)
CHOLEST SERPL-MCNC: 239 MG/DL
CO2 SERPL-SCNC: 30 MMOL/L (ref 20–32)
CREAT SERPL-MCNC: 1.06 MG/DL (ref 0.66–1.25)
FASTING STATUS PATIENT QL REPORTED: NO
GFR SERPL CREATININE-BSD FRML MDRD: 80 ML/MIN/1.73M2
GLUCOSE BLD-MCNC: 117 MG/DL (ref 70–99)
HBA1C MFR BLD: 5.7 % (ref 0–5.6)
HDLC SERPL-MCNC: 42 MG/DL
LDLC SERPL CALC-MCNC: 165 MG/DL
NONHDLC SERPL-MCNC: 197 MG/DL
POTASSIUM BLD-SCNC: 3.3 MMOL/L (ref 3.4–5.3)
PROT SERPL-MCNC: 8.3 G/DL (ref 6.8–8.8)
SODIUM SERPL-SCNC: 137 MMOL/L (ref 133–144)
TRIGL SERPL-MCNC: 158 MG/DL

## 2021-11-15 PROCEDURE — 80061 LIPID PANEL: CPT | Performed by: FAMILY MEDICINE

## 2021-11-15 PROCEDURE — 82040 ASSAY OF SERUM ALBUMIN: CPT | Performed by: FAMILY MEDICINE

## 2021-11-15 PROCEDURE — 87591 N.GONORRHOEAE DNA AMP PROB: CPT | Performed by: FAMILY MEDICINE

## 2021-11-15 PROCEDURE — 87491 CHLMYD TRACH DNA AMP PROBE: CPT | Performed by: FAMILY MEDICINE

## 2021-11-15 RX ORDER — CITALOPRAM HYDROBROMIDE 40 MG/1
40 TABLET ORAL EVERY MORNING
Qty: 90 TABLET | Refills: 3 | Status: SHIPPED | OUTPATIENT
Start: 2021-11-15 | End: 2023-02-06

## 2021-11-15 RX ORDER — NALTREXONE HYDROCHLORIDE 50 MG/1
50 TABLET, FILM COATED ORAL DAILY
Qty: 90 TABLET | Refills: 3 | Status: SHIPPED | OUTPATIENT
Start: 2021-11-15 | End: 2022-11-29

## 2021-11-15 ASSESSMENT — MIFFLIN-ST. JEOR: SCORE: 1763.29

## 2021-11-15 NOTE — PATIENT INSTRUCTIONS
KELLY Caldwell is a 52 yo with Cerebral Palsy, Hypertension and Depression who presents for an annual follow up    ASSESSMENT/PLAN:    1. Hypertension, under 'fair control' with 10 mg Amlodipine and 25 mg hctz  -Continue meds  -Aim to lose a few more pounds. Goal weight between 185-190 lbs.   - Keep monitoring BP. Record on phone  -Add Magnesium 400 mg at bedtime.   -Take your Amlodipine also at night.     2. Depression, stable.   - Continue Celexa and also your therapy    3. Reschedule colonoscopy. Referral sent.     Follow up in about 6 months.     Mt Becker MD, MS

## 2021-11-15 NOTE — PROGRESS NOTES
"    IMPRESSION  Javi is a 50 yo with Cerebral Palsy, Hypertension and Depression who presents for an annual follow up    ASSESSMENT/PLAN:    1. Hypertension, under 'fair control' with 10 mg Amlodipine and 25 mg hctz  -Continue meds  -Aim to lose a few more pounds. Goal weight between 185-190 lbs.   - Keep monitoring BP. Record on phone  -Add Magnesium 400 mg at bedtime.   -Take your Amlodipine also at night.     2. Depression, stable.   - Continue Celexa and also your therapy    3. Reschedule colonoscopy. Referral sent.     Follow up in about 6 months.     Mt Becker MD, MS    Brief Introduction: Javi is a 50 yo with Cerebral Palsy that affects his left side and an impulse control disorder that is managed with medication (Naltrexone) and therapy. I have seen Javi on several occasions in the past for Hypertension and depression.  He was here mostly for follow up of those issues, but hasn't had an annual exam in a while given the pandemic so we decided to combine things as his BP and depression were fairly stable.       Current meds are:   Current Outpatient Medications   Medication Sig Dispense Refill     amLODIPine (NORVASC) 10 MG tablet TAKE 1 TABLET(10 MG) BY MOUTH DAILY 60 tablet 0     citalopram (CELEXA) 40 MG tablet Take 1 tablet (40 mg) by mouth every morning 90 tablet 3     hydrochlorothiazide (HYDRODIURIL) 25 MG tablet Take 1 tablet (25 mg) by mouth daily 90 tablet 3     magnesium oxide (MAG-OX) 400 (241.3 Mg) MG tablet Take 1 tablet (400 mg) by mouth At Bedtime 90 tablet 3     naltrexone (DEPADE/REVIA) 50 MG tablet Take 1 tablet (50 mg) by mouth daily 90 tablet 3     Brings in BP reading and they are generally around 133/80-84.     Depression is stable. In once/month men's group for therapy.     States that things are \"good.\" He's enjoying working from home.   His relationships with his children are going well.     Dating a woman who lives in both [place] and [place]       Social  Social History     Social " History Narrative    Lives in [place].    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     since about 2013.         3 sons,: Oldest b 2001, and Twins b 2004.          Lifestyle habits and Preventive health issues:     Physical activity - Still not exercising much.     Diet - generally healthy.     Wt Readings from Last 5 Encounters:   11/15/21 88 kg (194 lb 0.6 oz)   01/18/21 90.3 kg (199 lb)   12/21/20 93 kg (205 lb)   11/04/20 92.6 kg (204 lb 4 oz)   01/27/20 95.7 kg (211 lb 1.3 oz)         Alcohol intake involves about 1 night/month and only 1-2 drinks on that night.     He does not use tobacco products.   His sleep is good.     Wears seat belt.--Yes.  Wears bike helmet--Yes. .      Has a new female partner.  Denies any risk for STIs but is open to being checked today.      Colorectal cancer screening -had colonoscopy about 3 years ago with some polyps and a recommendation to repeat in 3 years.    ROS  PHQ-2 Score:     PHQ-2 ( 1999 Pfizer) 11/4/2020 1/27/2020   Q1: Little interest or pleasure in doing things 0 0   Q2: Feeling down, depressed or hopeless 0 0   PHQ-2 Score 0 0       CONSTITUTIONAL:NEGATIVE for fever, chills, change in weight  INTEGUMENTARY/SKIN: NEGATIVE for worrisome rashes, moles or lesions  EYES: NEGATIVE for vision changes or irritation  ENT/MOUTH: NEGATIVE for ear, mouth and throat problems  RESP:NEGATIVE for significant cough or SOB  CV: NEGATIVE for chest pain, palpitations, REIS, orthopnea, PND  or peripheral edema  GI: NEGATIVE for nausea, abdominal pain, heartburn, or change in bowel habits  :NEGATIVE for frequency, dysuria, or hematuria  MUSCULOSKELETAL:NEGATIVE for significant arthralgias or myalgia  NEURO: NEGATIVE for weakness, dizziness or paresthesias  ENDOCRINE: NEGATIVE for polyuria/dipsia,  temperature intolerance, skin/hair changes  HEME/ALLERGY/IMMUNE: NEGATIVE for bleeding problems  PSYCHIATRIC: NEGATIVE for changes in mood or affect        Current Outpatient  "Medications   Medication Sig Dispense Refill     amLODIPine (NORVASC) 10 MG tablet TAKE 1 TABLET(10 MG) BY MOUTH DAILY 60 tablet 0     citalopram (CELEXA) 40 MG tablet Take 1 tablet (40 mg) by mouth every morning 90 tablet 3     hydrochlorothiazide (HYDRODIURIL) 25 MG tablet Take 1 tablet (25 mg) by mouth daily 90 tablet 3     magnesium oxide (MAG-OX) 400 (241.3 Mg) MG tablet Take 1 tablet (400 mg) by mouth At Bedtime 90 tablet 3     naltrexone (DEPADE/REVIA) 50 MG tablet Take 1 tablet (50 mg) by mouth daily 90 tablet 3     Allergies   Allergen Reactions     Dust Mites      Grass      Ragweeds      Codeine Anxiety     \"gets loopy\" - doesn't like how it feels       Health Maintenance   Topic Date Due     PREVENTIVE CARE VISIT  Never done     ADVANCE CARE PLANNING  Never done     DEPRESSION ACTION PLAN  Never done     ZOSTER IMMUNIZATION (1 of 2) Never done     COVID-19 Vaccine (3 - Booster for Pfizer series) 12/17/2021     RAGINI ASSESSMENT  01/18/2022     PHQ-9  01/18/2022     MENTAL HEALTH TX PLAN  09/04/2022     LIPID  12/31/2025     COLORECTAL CANCER SCREENING  11/20/2028     DTAP/TDAP/TD IMMUNIZATION (3 - Td or Tdap) 11/04/2030     HEPATITIS C SCREENING  Completed     HIV SCREENING  Completed     INFLUENZA VACCINE  Completed     Pneumococcal Vaccine: Pediatrics (0 to 5 Years) and At-Risk Patients (6 to 64 Years)  Aged Out     IPV IMMUNIZATION  Aged Out     MENINGITIS IMMUNIZATION  Aged Out     HEPATITIS B IMMUNIZATION  Aged Out         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 Martinez       Family History   Problem Relation Age of Onset     Anxiety Disorder Maternal Grandfather      Mental Illness Father      Depression Father      Obesity Father      Alcoholism Father      Depression Mother      Hyperlipidemia Mother      Diabetes Paternal Grandfather      Cerebrovascular Disease Paternal Grandfather      Macular Degeneration No family hx of      Glaucoma No family hx of          Immunizations are as follows:      Immunization History   Administered Date(s) Administered     COVID-19,PF,Pfizer (12+ Yrs) 05/20/2021, 06/17/2021     Influenza (IIV3) PF 10/26/2012     Influenza Quad, Recombinant, pf(RIV4) (Flublok) 10/10/2018, 11/15/2021     Influenza Vaccine IM > 6 months Valent IIV4 (Alfuria,Fluzone) 11/09/2019, 10/19/2020     TDAP Vaccine (Adacel) 05/24/2010     Tdap (Adacel,Boostrix) 11/04/2020           EXAM  BP (!) 141/87   Pulse 82   Temp 97  F (36.1  C)   Ht 1.829 m (6' 0.01\")   Wt 88 kg (194 lb 0.6 oz)   SpO2 97%   BMI 26.31 kg/m      Repeat BP at   Vitals:    11/15/21 1455 11/15/21 1456   BP: (!) 144/91 (!) 141/87       GENERAL APPEARANCE: Appears in usual state of health.   Ambulates with an antalgic gait due to his Cerebral palsy.   HEENT: Normal. Neck supple. No adenopathy, thyroid normal to palpation  RESP: Lungs clear to auscultation bilaterally.  Axillae: no palpable axillary masses or adenopathy  CV: regular rate and rhythm, normal S1 S2, no murmur, no carotid bruits  ABDOMEN: soft, nontender, without HSM or masses. Bowel sounds normal   and Rectal exam: deferred. He had no concerns  MS: Generalized muscle atrophy of LEFT leg vs RIGHT leg. Has intact active ROM of all limbs.   SKIN: no suspicious lesions or rashes  NEURO: Normal strength and tone, sensory exam grossly normal  PSYCH: mentation appears normal. and affect normal/bright.  EXT: no peripheral edema        SEE TOP OF NOTE " FOR ASSESSMENT AND PLAN      Mt Becker MD, MS  [facility] Department of Family Medicine and Community Health

## 2021-11-16 LAB
C TRACH DNA SPEC QL NAA+PROBE: NEGATIVE
N GONORRHOEA DNA SPEC QL NAA+PROBE: NEGATIVE

## 2021-11-16 RX ORDER — ATORVASTATIN CALCIUM 10 MG/1
10 TABLET, FILM COATED ORAL DAILY
Qty: 90 TABLET | Refills: 3 | Status: SHIPPED | OUTPATIENT
Start: 2021-11-16 | End: 2023-02-06

## 2021-11-17 DIAGNOSIS — F32.A DEPRESSION, UNSPECIFIED DEPRESSION TYPE: ICD-10-CM

## 2021-11-17 RX ORDER — CITALOPRAM HYDROBROMIDE 40 MG/1
TABLET ORAL
Qty: 30 TABLET | OUTPATIENT
Start: 2021-11-17

## 2021-11-17 ASSESSMENT — ANXIETY QUESTIONNAIRES
2. NOT BEING ABLE TO STOP OR CONTROL WORRYING: NOT AT ALL
7. FEELING AFRAID AS IF SOMETHING AWFUL MIGHT HAPPEN: NOT AT ALL
3. WORRYING TOO MUCH ABOUT DIFFERENT THINGS: NOT AT ALL
6. BECOMING EASILY ANNOYED OR IRRITABLE: NOT AT ALL
GAD7 TOTAL SCORE: 0
1. FEELING NERVOUS, ANXIOUS, OR ON EDGE: NOT AT ALL
5. BEING SO RESTLESS THAT IT IS HARD TO SIT STILL: NOT AT ALL

## 2021-11-17 ASSESSMENT — PATIENT HEALTH QUESTIONNAIRE - PHQ9: 5. POOR APPETITE OR OVEREATING: NOT AT ALL

## 2021-11-17 NOTE — TELEPHONE ENCOUNTER
Citalopram (Celexa) 40 mg    Last Office Visit: 11/5/21  Future [facility] Appointments: None  Medication last refilled: 11/15/21 #90 with 3 refill(s)    Duplicate request.    Francia Lopez, RN, BSN

## 2021-11-18 ASSESSMENT — PATIENT HEALTH QUESTIONNAIRE - PHQ9: SUM OF ALL RESPONSES TO PHQ QUESTIONS 1-9: 1

## 2021-11-18 ASSESSMENT — ANXIETY QUESTIONNAIRES: GAD7 TOTAL SCORE: 0

## 2021-12-06 ENCOUNTER — VIRTUAL VISIT (OUTPATIENT)
Dept: PSYCHOLOGY | Facility: CLINIC | Age: 51
End: 2021-12-06

## 2021-12-06 DIAGNOSIS — Z51.89 AFTERCARE: Primary | ICD-10-CM

## 2021-12-06 PROCEDURE — 99207 PR CSH SUPPORT GROUP 120 MINUTES: CPT | Mod: 95

## 2021-12-06 PROCEDURE — 99207 PR NO CHARGE LOS: CPT

## 2021-12-08 NOTE — PROGRESS NOTES
[facility]  Case Progress Note    21    Client Name: Javi Salmeron  YOB: 1970  MRN:  [id number]  Treating Provider: Connie Baugh PhD  Type of Session: Aftercare  Number of Minutes: 120    Health Maintenance Summary - Mental Health Treatment Plan     This patient has no relevant Health Maintenance data.      Video start time: 4:30  Video end time: 6:30    Telemedicine Visit: The patient's condition can be safely assessed and treated via synchronous audio and visual telemedicine encounter.      Reason for Telemedicine Visit: Services only offered telehealth - COVID    Originating Site (Patient Location): Patient's home    Distant Site (Provider Location): [facility] Clinics: [facility]    Consent:  The patient/guardian has verbally consented to: the potential risks and benefits of telemedicine (video visit) versus in person care; bill my insurance or make self-payment for services provided; and responsibility for payment of non-covered services.     Mode of Communication:  Video Conference via Zoom    As the provider I attest to compliance with applicable laws and regulations related to telemedicine.    Current Symptoms/Status:  Staying within boundaries.  Stable.    Progress Toward Treatment Goals:   Using support system.       Intervention: Modality and Description:  Aftercare support group    Response to Intervention:       Taking vacation with girlfriend.  Feeling good about relationship.  Using maintenance plan    Assignment:  Stay within boundaries.       Diagnosis:  Aftercare    Plan / Need for Future Services:  Continue individual therapy 1X a month.  Monthly aftercare support group.     TREATMENT PLAN  Date of Treatment Plan 10-4-21  Name: Javi Salmeron  : 49  Medical Record Number: [id number]  Treating Provider: Connie Baugh PhD        Current Status:  Depression/Mood:  Somewhat discouraged.  Low self esteem     Anxiety/Panic:  Anxious, worry     Thought:   Reports no  problems or symptoms at this time     Sensorium:  Reports no problems or symptoms at this time     Behavior/Health:  Reports no problems or symptoms at this time     Chemical Use:  Denies both Alcohol and Drug Abuse     Sexual Problems:  History of CSB     Suicide Risk Assessment:  Assessed Level of Immediate Risk:  NOT REVIEWED  Ideation:  NOT APPLICABLE  Plan:  NOT APPLICABLE  Means:  NOT APPLICABLE  Intent:  NOT APPLICABLE     Homicide Risk Assessment:  Assessed Level of Immediate Risk:  NOT REVIEWED  Ideation:  NOT APPLICABLE  Plan:  NOT APPLICABLE  Means:  NOT APPLICABLE  Intent:  NOT APPLICABLE     Impact of Symptoms on Function:  No Current Impact of Symptoms on Function     DSM-5 Diagnoses:   Dysthymia  Aftercare        Screening Questionnaires:  Please indicate whether the following screening questionnaires have been completed:  CAGE: No  PHQ-9: No    RAGINI-7: No  WHODAS: No     Problem(s):  1. History of CSB  2.  Dysthmia        Short-Long Term Goals  Increase Coping     Interventions  Aftercare support group     Expected Outcomes and Prognosis  Maintain current status / Prevent deterioration     Anticipated Treatment Duration:  Unknown     Frequency of Sessions  Monthly     Progress Update (for Plan Update Only)  Compliant, Progressing and Improving - Needs More Sessions     Current Psychoactive Medications:  Not Applicable  Discharge & Aftercare Goals: To Be Determined.  Care Coordination: No     Consent to Treatment:   Patient participated in this treatment planning process and indicated verbal agreement with the above treatment plan.  If patient doesn't sign, indicate why: Telehealth visit due to COVID19 pandemic     Javi Salmeron  10-4-21     Connie Baugh, PhD  10-4-21

## 2021-12-28 ENCOUNTER — TELEPHONE (OUTPATIENT)
Dept: GASTROENTEROLOGY | Facility: CLINIC | Age: 51
End: 2021-12-28
Payer: COMMERCIAL

## 2021-12-28 DIAGNOSIS — Z11.59 ENCOUNTER FOR SCREENING FOR OTHER VIRAL DISEASES: ICD-10-CM

## 2021-12-28 NOTE — TELEPHONE ENCOUNTER
Screening Questions  1. Are you active on mychart? Y    2. What insurance is in the chart? [employer]    2.  Ordering/Referring Provider: CARLOS PEREZ    3. BMI 27, If greater than 40 review exclusion criteria also will need EXTENDED PREP    4.  Respiratory Screening (If yes to any of the following Hospital setting only):     Do you use daily home oxygen? N  Do you have mod to severe Obstructive Sleep Apnea? N   Do you have Pulmonary Hypertension? N   Do you have UNCONTROLLED asthma? N    5. Have you had a heart or lung transplant (If yes, please review exclusion criteria) ? N    6. Are you currently on dialysis or have chronic kidney disease? N    7. Have you had a stroke or Transient ischemic attack (TIA) within 6 months? N    8. In the past 6 months, have you had any heart related issues including cardiomyopathy or heart attack? N                 If yes, did it require cardiac stenting or other implantable device?      9. Do you have any implantable devices in your body (pacemaker, defib, LVAD)? N    10. Do you take nitroglycerin? If yes, how often? N    11. Are you currently taking any blood thinners?N    12. Are you a diabetic? N    13. (Females) Are you currently pregnant?   If yes, how many weeks?      15. Are you taking any prescription pain medications on a routine schedule? N If yes, MAC sedation and patient will need EXTENDED PREP.    16. Do you have any chemical dependencies such as alcohol, street drugs, or methadone? N If yes, MAC sedation.    17. Do you have any history of post-traumatic stress syndrome, severe anxiety or history of psychosis? N    18. Do you transfer independently? Y    19.  Do you have any issues with constipation? N   If yes, pt will need EXTENDED PREP     20. Preferred Pharmacy for Pre Prescription   [id number] - [place], [place] - [address] AT [facility] [address] Phone:  [phone]                Scheduling Details      Which Colonoscopy Prep was  Sent?: MIRALAX  Type of Procedure Scheduled: COLON  Surgeon:   Date of Procedure: 1/11/22  Location: [facility]  Caller (Please ask for phone number if not scheduled by patient): ISAC      Sedation Type: MAC  Conscious Sedation- Needs  for 6 hours after the procedure  MAC/General-Needs  for 24 hours after procedure    Pre-op Required at [facility] [place], [place] and OR for MAC sedation: NA  (if yes advise patient they will need a pre-op prior to procedure)      Is patient on blood thinners? -N (If yes- inform patient to follow up with PCP or provider for follow up instructions)     Informed patient they will need an adult  Y  Cannot take any type of public or medical transportation alone    Pre-Procedure Covid test to be completed at [facility] or Externally: EXTERNALLY    Confirmed Nurse will call to complete assessment Y    Additional comments:

## 2022-01-05 ENCOUNTER — TELEPHONE (OUTPATIENT)
Dept: GASTROENTEROLOGY | Facility: CLINIC | Age: 52
End: 2022-01-05
Payer: COMMERCIAL

## 2022-01-05 NOTE — TELEPHONE ENCOUNTER
Pre assessment questions completed for upcoming colonoscopy procedure scheduled on 1.11.2022    COVID test:  Pt will use a local pharmacy.  Pt is aware that he needs a PCR nasal swab within 4 days of procedure. Pt is aware that [facility] does not accept rapid tests or spit tests.     Reviewed procedural arrival time 0800 and facility location [facility].    Designated  policy reviewed. Instructed to have someone stay 24 hours post procedure.     Anticoagulation/blood thinners? no    Electronic implanted devices? No    Constipation/straining with bowel movements?  No    Indication for procedure: hx of colonic polyps    Referring provider: Mt Becker MD    Pt needs to f/u with PCP re: naltrexone dosing prior to procedure.    Reviewed Miralax/Magnesium citrate/Dulcolax prep instructions with patient. No fiber/iron supplements or foods that contain nuts/seeds prior to procedure.     Patient verbalized understanding and had no questions or concerns at this time.    Darby Berrios RN

## 2022-01-07 ENCOUNTER — LAB (OUTPATIENT)
Dept: LAB | Facility: CLINIC | Age: 52
End: 2022-01-07
Attending: INTERNAL MEDICINE
Payer: COMMERCIAL

## 2022-01-07 DIAGNOSIS — Z11.59 ENCOUNTER FOR SCREENING FOR OTHER VIRAL DISEASES: ICD-10-CM

## 2022-01-07 PROCEDURE — U0003 INFECTIOUS AGENT DETECTION BY NUCLEIC ACID (DNA OR RNA); SEVERE ACUTE RESPIRATORY SYNDROME CORONAVIRUS 2 (SARS-COV-2) (CORONAVIRUS DISEASE [COVID-19]), AMPLIFIED PROBE TECHNIQUE, MAKING USE OF HIGH THROUGHPUT TECHNOLOGIES AS DESCRIBED BY CMS-2020-01-R: HCPCS

## 2022-01-07 PROCEDURE — U0005 INFEC AGEN DETEC AMPLI PROBE: HCPCS

## 2022-01-08 LAB — SARS-COV-2 RNA RESP QL NAA+PROBE: NEGATIVE

## 2022-01-11 ENCOUNTER — TRANSFERRED RECORDS (OUTPATIENT)
Dept: HEALTH INFORMATION MANAGEMENT | Facility: CLINIC | Age: 52
End: 2022-01-11
Payer: COMMERCIAL

## 2022-01-11 ENCOUNTER — DOCUMENTATION ONLY (OUTPATIENT)
Dept: GASTROENTEROLOGY | Facility: OUTPATIENT CENTER | Age: 52
End: 2022-01-11
Payer: COMMERCIAL

## 2022-01-11 DIAGNOSIS — Z86.0100 HISTORY OF COLONIC POLYPS: ICD-10-CM

## 2022-01-11 PROCEDURE — 88305 TISSUE EXAM BY PATHOLOGIST: CPT | Mod: TC,ORL | Performed by: INTERNAL MEDICINE

## 2022-01-11 PROCEDURE — 88305 TISSUE EXAM BY PATHOLOGIST: CPT | Mod: 26 | Performed by: PATHOLOGY

## 2022-01-12 ENCOUNTER — LAB REQUISITION (OUTPATIENT)
Dept: LAB | Facility: CLINIC | Age: 52
End: 2022-01-12
Payer: COMMERCIAL

## 2022-01-13 LAB
PATH REPORT.COMMENTS IMP SPEC: NORMAL
PATH REPORT.COMMENTS IMP SPEC: NORMAL
PATH REPORT.FINAL DX SPEC: NORMAL
PATH REPORT.GROSS SPEC: NORMAL
PATH REPORT.MICROSCOPIC SPEC OTHER STN: NORMAL
PATH REPORT.RELEVANT HX SPEC: NORMAL
PHOTO IMAGE: NORMAL

## 2022-01-20 DIAGNOSIS — I10 ESSENTIAL HYPERTENSION, BENIGN: ICD-10-CM

## 2022-01-20 RX ORDER — HYDROCHLOROTHIAZIDE 25 MG/1
25 TABLET ORAL DAILY
Qty: 90 TABLET | Refills: 3 | Status: SHIPPED | OUTPATIENT
Start: 2022-01-20 | End: 2023-02-06

## 2022-01-20 NOTE — TELEPHONE ENCOUNTER
Last time prescribed: 2/10/21 , 90 tabs x 3 refills  Last office visit: 11/15/21  Next appointment: No future appointments

## 2022-02-07 ENCOUNTER — VIRTUAL VISIT (OUTPATIENT)
Dept: PSYCHOLOGY | Facility: CLINIC | Age: 52
End: 2022-02-07

## 2022-02-07 DIAGNOSIS — Z51.89 AFTERCARE: Primary | ICD-10-CM

## 2022-02-07 PROCEDURE — 99207 PR NO CHARGE LOS: CPT

## 2022-02-07 PROCEDURE — 99207 PR CSH SUPPORT GROUP 120 MINUTES: CPT | Mod: 95

## 2022-02-13 NOTE — PROGRESS NOTES
[facility]  Case Progress Note    22    Client Name: Javi Salmeron  YOB: 1970  MRN:  [id number]  Treating Provider: Connie Baugh PhD  Type of Session: Aftercare  Number of Minutes: 120    Health Maintenance Summary - Mental Health Treatment Plan     This patient has no relevant Health Maintenance data.      Video start time: 4:30  Video end time: 6:30    Telemedicine Visit: The patient's condition can be safely assessed and treated via synchronous audio and visual telemedicine encounter.      Reason for Telemedicine Visit: Services only offered telehealth - COVID    Originating Site (Patient Location): Patient's home    Distant Site (Provider Location): [facility] Clinics: [facility]    Consent:  The patient/guardian has verbally consented to: the potential risks and benefits of telemedicine (video visit) versus in person care; bill my insurance or make self-payment for services provided; and responsibility for payment of non-covered services.     Mode of Communication:  Video Conference via Zoom    As the provider I attest to compliance with applicable laws and regulations related to telemedicine.    Current Symptoms/Status:  Staying within boundaries.  Stable.    Progress Toward Treatment Goals:   Using support system.       Intervention: Modality and Description:  Aftercare support group    Response to Intervention:       Girlfriend broke up with him.  Grieving.  Received quite a bit of support from group.  Using maintenance plan.    Assignment:  Stay within boundaries.       Diagnosis:  Aftercare    Plan / Need for Future Services:  Continue individual therapy 1X a month.  Monthly aftercare support group.     TREATMENT PLAN  Date of Treatment Plan 10-4-21  Name: Javi Salmeron  : 49  Medical Record Number: [id number]  Treating Provider: Connie Baugh PhD        Current Status:  Depression/Mood:  Somewhat discouraged.  Low self esteem     Anxiety/Panic:  Anxious,  worry     Thought:   Reports no problems or symptoms at this time     Sensorium:  Reports no problems or symptoms at this time     Behavior/Health:  Reports no problems or symptoms at this time     Chemical Use:  Denies both Alcohol and Drug Abuse     Sexual Problems:  History of CSB     Suicide Risk Assessment:  Assessed Level of Immediate Risk:  NOT REVIEWED  Ideation:  NOT APPLICABLE  Plan:  NOT APPLICABLE  Means:  NOT APPLICABLE  Intent:  NOT APPLICABLE     Homicide Risk Assessment:  Assessed Level of Immediate Risk:  NOT REVIEWED  Ideation:  NOT APPLICABLE  Plan:  NOT APPLICABLE  Means:  NOT APPLICABLE  Intent:  NOT APPLICABLE     Impact of Symptoms on Function:  No Current Impact of Symptoms on Function     DSM-5 Diagnoses:   Dysthymia  Aftercare        Screening Questionnaires:  Please indicate whether the following screening questionnaires have been completed:  CAGE: No  PHQ-9: No    RAGINI-7: No  WHODAS: No     Problem(s):  1. History of CSB  2.  Dysthmia        Short-Long Term Goals  Increase Coping     Interventions  Aftercare support group     Expected Outcomes and Prognosis  Maintain current status / Prevent deterioration     Anticipated Treatment Duration:  Unknown     Frequency of Sessions  Monthly     Progress Update (for Plan Update Only)  Compliant, Progressing and Improving - Needs More Sessions     Current Psychoactive Medications:  Not Applicable  Discharge & Aftercare Goals: To Be Determined.  Care Coordination: No     Consent to Treatment:   Patient participated in this treatment planning process and indicated verbal agreement with the above treatment plan.  If patient doesn't sign, indicate why: Telehealth visit due to COVID19 pandemic     Javi Salmeron  10-4-21     Connie Baugh, PhD  10-4-21

## 2022-02-14 ENCOUNTER — VIRTUAL VISIT (OUTPATIENT)
Dept: PSYCHOLOGY | Facility: CLINIC | Age: 52
End: 2022-02-14
Payer: COMMERCIAL

## 2022-02-14 DIAGNOSIS — F34.1 DYSTHYMIC DISORDER: Primary | ICD-10-CM

## 2022-02-14 DIAGNOSIS — F91.8 CONDUCT DISORDER, UNDIFFERENTIATED TYPE: ICD-10-CM

## 2022-02-14 PROCEDURE — 90837 PSYTX W PT 60 MINUTES: CPT | Mod: GT | Performed by: PSYCHOLOGIST

## 2022-02-14 PROCEDURE — 99207 PR NO CHARGE LOS: CPT | Performed by: PSYCHOLOGIST

## 2022-02-17 NOTE — RESULT ENCOUNTER NOTE
"Dear Javi,    I am writing to inform you about the pathology results from your colonoscopy and remind you about when your next colonoscopy is due. The pathology result confirmed a \"hyperplastic polyp\" which is a benign polyp and not considered pre-cancerous. In light of the findings from your last colonoscopy in 2018 when we removed a large colon polyp (tubulovillous adenoma), I recommend that you have a follow up surveillance colonoscopy in 5 years.     Jaelyn Cohasset  Gastroenterology"

## 2022-02-22 NOTE — PROGRESS NOTES
[facility]  Case Progress Note    2/14/22    Client Name: Javi Salmeron  YOB: 1970  MRN:  [id number]   Treating Provider: Connie Baugh, PhD  Type of Session: Individual  Number of Minutes: 55    Health Maintenance Summary - Mental Health Treatment Plan     This patient has no relevant Health Maintenance data.      Video start time: 5:00  Video end time: 5:55    Telemedicine Visit: The patient's condition can be safely assessed and treated via synchronous audio and visual telemedicine encounter.      Reason for Telemedicine Visit: Services only offered telehealth - COVID    Originating Site (Patient Location): Patient's home    Distant Site (Provider Location): [facility] Clinics: [facility]    Consent:  The patient/guardian has verbally consented to: the potential risks and benefits of telemedicine (video visit) versus in person care; bill my insurance or make self-payment for services provided; and responsibility for payment of non-covered services.     Mode of Communication:  Video Conference via Doxy    As the provider I attest to compliance with applicable laws and regulations related to telemedicine.      Current Symptoms/Status:  Not having issues with CSB.  Recent break up - and dealing with grief.    Progress Toward Treatment Goals:   Using support system.       Intervention: Modality and Description:  Individual, interpersonal, CBT    Response to Intervention:  Was triggered by girfriend rejecting him.  Very confused why.   Triggered his insecurity.  Old negative fuels.  Using support system - and handling grief.     Assignment:  Stay within boundaries.        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  See in one month.  Monthly aftercare support group.     TREATMENT PLAN  Date of Treatment Plan 4-5-21  Name: Javi NOVOA  Faviola  : 1970  Medical Record Number: [id number]  Treating Provider: Connie Baugh, PhD  Type of Session: Individual  Present in Session: Patient.    Current Status:  Depression/Mood:  Reports no problems or symptoms at this time    Anxiety/Panic:  Reports no problems or symptoms at this time    Thought:   Reports no problems or symptoms at this time    Sensorium:  Reports no problems or symptoms at this time    Behavior/Health:  Reports no problems or symptoms at this time    Chemical Use:  Denies both Alcohol and Drug Abuse    Sexual Problems:  Hx of compulsive sexual behavior.    Suicide Risk Assessment:  Assessed Level of Immediate Risk:  YES  Ideation:  NO  Plan:  NO  Means:  NO  Intent:  NO    Homicide Risk Assessment:  Assessed Level of Immediate Risk:  YES  Ideation:  NO  Plan:  NO  Means:  NO  Intent:  NO    Impact of Symptoms on Function:  No Current Impact of Symptoms on Function    DSM-5 Diagnoses:   Diagnoses       Codes Comments    Conduct disorder, undifferentiated type    -  Primary F91.8     Dysthymic disorder     F34.1           Screening Questionnaires:  Please indicate whether the following screening questionnaires have been completed:  CAGE: No  PHQ-9: No    RAGINI-7: No  Safety Screening: No  WHODAS: No    Problem(s):  1. Dysthymia  2. Hx of compulsive sexual behavior.      Short-Long Term Goals  Increase Coping  Monitor Psych Status  Enhance Relationships    Interventions  [facility]  Cognitive-Behavioral Therapy  Aftercare support group    Expected Outcomes and Prognosis  Expected improvement    Anticipated Treatment Duration:  Unknown    Frequency of Sessions  1X 4-6 months  Monthly aftercare support xiomara    Progress Update (for Plan Update Only)  Compliant, Progressing and Improving - Needs More Sessions    Current Psychoactive Medications:  See Psychiatric Treatment Plan  Discharge & Aftercare Goals: To Be Determined.  Care Coordination: No    Consent to Treatment:    Patient participated in this treatment planning process and indicated verbal agreement with the above treatment plan.  If patient doesn't sign, indicate why: Telehealth visit due to COVID19 pandemic    Patient Signature: Javi Salmeron  Date: 4-5-21    Provider Signature: Connie Baugh, PhD  Date: 4-5-21

## 2022-03-07 ENCOUNTER — VIRTUAL VISIT (OUTPATIENT)
Dept: PSYCHOLOGY | Facility: CLINIC | Age: 52
End: 2022-03-07

## 2022-03-07 ENCOUNTER — VIRTUAL VISIT (OUTPATIENT)
Dept: PSYCHOLOGY | Facility: CLINIC | Age: 52
End: 2022-03-07
Payer: COMMERCIAL

## 2022-03-07 DIAGNOSIS — Z51.89 AFTERCARE: Primary | ICD-10-CM

## 2022-03-07 DIAGNOSIS — F91.8 CONDUCT DISORDER, UNDIFFERENTIATED TYPE: ICD-10-CM

## 2022-03-07 DIAGNOSIS — F34.1 DYSTHYMIC DISORDER: Primary | ICD-10-CM

## 2022-03-07 PROCEDURE — 99207 PR NO CHARGE LOS: CPT | Performed by: PSYCHOLOGIST

## 2022-03-07 PROCEDURE — 99207 PR CSH SUPPORT GROUP 120 MINUTES: CPT | Mod: 95

## 2022-03-07 PROCEDURE — 90837 PSYTX W PT 60 MINUTES: CPT | Mod: GT | Performed by: PSYCHOLOGIST

## 2022-03-07 PROCEDURE — 99207 PR NO CHARGE LOS: CPT

## 2022-03-07 NOTE — PROGRESS NOTES
[facility]  Case Progress Note    3/07/22    Client Name: Javi Salmeron  YOB: 1970  MRN:  [id number]   Treating Provider: Connie Baugh, PhD  Type of Session: Individual  Number of Minutes: 55    Health Maintenance Summary - Mental Health Treatment Plan     This patient has no relevant Health Maintenance data.      Video start time: 2:00  Video end time: 2:55    Telemedicine Visit: The patient's condition can be safely assessed and treated via synchronous audio and visual telemedicine encounter.      Reason for Telemedicine Visit: Services only offered telehealth - COVID    Originating Site (Patient Location): Patient's home    Distant Site (Provider Location): [facility] Clinics: [facility]    Consent:  The patient/guardian has verbally consented to: the potential risks and benefits of telemedicine (video visit) versus in person care; bill my insurance or make self-payment for services provided; and responsibility for payment of non-covered services.     Mode of Communication:  Video Conference via Zoom    As the provider I attest to compliance with applicable laws and regulations related to telemedicine.      Current Symptoms/Status:  Not having issues with CSB.  Recent break up - and dealing with grief.    Progress Toward Treatment Goals:   Using support system.       Intervention: Modality and Description:  Individual, interpersonal, CBT    Response to Intervention:  Girlfriend back talking to him.  Has done a lot of journaling.  Understanding some of his pattens.  Wants to talk to cameron landersfe about that.  Made reocmmendaton to look at attachment styles.      Using support system - and handling grief.     Assignment:  Stay within boundaries.      Read Attached.    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  See in one month.   Monthly aftercare support group.     TREATMENT PLAN  Date of Treatment Plan 21  Name: Javi Salmeron  : 1970  Medical Record Number: [id number]  Treating Provider: Connie Baugh, PhD  Type of Session: Individual  Present in Session: Patient.    Current Status:  Depression/Mood:  Reports no problems or symptoms at this time    Anxiety/Panic:  Reports no problems or symptoms at this time    Thought:   Reports no problems or symptoms at this time    Sensorium:  Reports no problems or symptoms at this time    Behavior/Health:  Reports no problems or symptoms at this time    Chemical Use:  Denies both Alcohol and Drug Abuse    Sexual Problems:  Hx of compulsive sexual behavior.    Suicide Risk Assessment:  Assessed Level of Immediate Risk:  YES  Ideation:  NO  Plan:  NO  Means:  NO  Intent:  NO    Homicide Risk Assessment:  Assessed Level of Immediate Risk:  YES  Ideation:  NO  Plan:  NO  Means:  NO  Intent:  NO    Impact of Symptoms on Function:  No Current Impact of Symptoms on Function    DSM-5 Diagnoses:   Diagnoses       Codes Comments    Conduct disorder, undifferentiated type    -  Primary F91.8     Dysthymic disorder     F34.1           Screening Questionnaires:  Please indicate whether the following screening questionnaires have been completed:  CAGE: No  PHQ-9: No    RAGINI-7: No  Safety Screening: No  WHODAS: No    Problem(s):  1. Dysthymia  2. Hx of compulsive sexual behavior.      Short-Long Term Goals  Increase Coping  Monitor Psych Status  Enhance Relationships    Interventions  [facility]  Cognitive-Behavioral Therapy  Aftercare support group    Expected Outcomes and Prognosis  Expected improvement    Anticipated Treatment Duration:  Unknown    Frequency of Sessions  1X 4-6 months  Monthly aftercare support xiomara    Progress Update (for Plan Update Only)  Compliant, Progressing and Improving - Needs More Sessions    Current Psychoactive Medications:  See Psychiatric Treatment  Plan  Discharge & Aftercare Goals: To Be Determined.  Care Coordination: No    Consent to Treatment:   Patient participated in this treatment planning process and indicated verbal agreement with the above treatment plan.  If patient doesn't sign, indicate why: Telehealth visit due to COVID19 pandemic    Patient Signature: Javi Salmeron  Date: 4-5-21    Provider Signature: Connie Baugh, PhD  Date: 4-5-21

## 2022-03-21 NOTE — PROGRESS NOTES
[facility]  Case Progress Note    3/07/22    Client Name: Javi Salmeron  YOB: 1970  MRN:  [id number]  Treating Provider: Connie Baugh PhD  Type of Session: Aftercare  Number of Minutes: 120    Health Maintenance Summary - Mental Health Treatment Plan     This patient has no relevant Health Maintenance data.      Video start time: 4:30  Video end time: 6:30    Telemedicine Visit: The patient's condition can be safely assessed and treated via synchronous audio and visual telemedicine encounter.      Reason for Telemedicine Visit: Services only offered telehealth - COVID    Originating Site (Patient Location): Patient's home    Distant Site (Provider Location): [facility] Clinics: [facility]    Consent:  The patient/guardian has verbally consented to: the potential risks and benefits of telemedicine (video visit) versus in person care; bill my insurance or make self-payment for services provided; and responsibility for payment of non-covered services.     Mode of Communication:  Video Conference via Zoom    As the provider I attest to compliance with applicable laws and regulations related to telemedicine.    Current Symptoms/Status:  Staying within boundaries.  Stable.    Progress Toward Treatment Goals:   Using support system.       Intervention: Modality and Description:  Aftercare support group    Response to Intervention:       Still processing conflict in relationship.  She is gong back and forth.  He needing to set limites with her.  Received quite a bit of support from group.  Using maintenance plan.    Assignment:  Stay within boundaries.       Diagnosis:  Aftercare    Plan / Need for Future Services:  Continue individual therapy 1X a month.  Monthly aftercare support group.     TREATMENT PLAN  Date of Treatment Plan 10-4-21  Name: Javi Salmeron  : 49  Medical Record Number: [id number]  Treating Provider: Connie Baugh, PhD        Current Status:  Depression/Mood:  Somewhat  discouraged.  Low self esteem     Anxiety/Panic:  Anxious, worry     Thought:   Reports no problems or symptoms at this time     Sensorium:  Reports no problems or symptoms at this time     Behavior/Health:  Reports no problems or symptoms at this time     Chemical Use:  Denies both Alcohol and Drug Abuse     Sexual Problems:  History of CSB     Suicide Risk Assessment:  Assessed Level of Immediate Risk:  NOT REVIEWED  Ideation:  NOT APPLICABLE  Plan:  NOT APPLICABLE  Means:  NOT APPLICABLE  Intent:  NOT APPLICABLE     Homicide Risk Assessment:  Assessed Level of Immediate Risk:  NOT REVIEWED  Ideation:  NOT APPLICABLE  Plan:  NOT APPLICABLE  Means:  NOT APPLICABLE  Intent:  NOT APPLICABLE     Impact of Symptoms on Function:  No Current Impact of Symptoms on Function     DSM-5 Diagnoses:   Dysthymia  Aftercare        Screening Questionnaires:  Please indicate whether the following screening questionnaires have been completed:  CAGE: No  PHQ-9: No    RAGINI-7: No  WHODAS: No     Problem(s):  1. History of CSB  2.  Dysthmia        Short-Long Term Goals  Increase Coping     Interventions  Aftercare support group     Expected Outcomes and Prognosis  Maintain current status / Prevent deterioration     Anticipated Treatment Duration:  Unknown     Frequency of Sessions  Monthly     Progress Update (for Plan Update Only)  Compliant, Progressing and Improving - Needs More Sessions     Current Psychoactive Medications:  Not Applicable  Discharge & Aftercare Goals: To Be Determined.  Care Coordination: No     Consent to Treatment:   Patient participated in this treatment planning process and indicated verbal agreement with the above treatment plan.  If patient doesn't sign, indicate why: Telehealth visit due to COVID19 pandemic     Javi Salmeron  10-4-21     Connie Baugh, PhD  10-4-21

## 2022-03-31 ENCOUNTER — VIRTUAL VISIT (OUTPATIENT)
Dept: SLEEP MEDICINE | Facility: CLINIC | Age: 52
End: 2022-03-31
Attending: FAMILY MEDICINE
Payer: COMMERCIAL

## 2022-03-31 VITALS — BODY MASS INDEX: 26.88 KG/M2 | HEIGHT: 71 IN | WEIGHT: 192 LBS

## 2022-03-31 DIAGNOSIS — G47.8 NON-RESTORATIVE SLEEP: ICD-10-CM

## 2022-03-31 DIAGNOSIS — G47.63 SLEEP RELATED BRUXISM: ICD-10-CM

## 2022-03-31 DIAGNOSIS — R06.83 SNORING: ICD-10-CM

## 2022-03-31 DIAGNOSIS — R06.81 WITNESSED EPISODE OF APNEA: Primary | ICD-10-CM

## 2022-03-31 PROCEDURE — 99204 OFFICE O/P NEW MOD 45 MIN: CPT | Mod: GT | Performed by: INTERNAL MEDICINE

## 2022-03-31 ASSESSMENT — SLEEP AND FATIGUE QUESTIONNAIRES
HOW LIKELY ARE YOU TO NOD OFF OR FALL ASLEEP WHILE SITTING INACTIVE IN A PUBLIC PLACE: WOULD NEVER DOZE
HOW LIKELY ARE YOU TO NOD OFF OR FALL ASLEEP WHILE SITTING QUIETLY AFTER LUNCH WITHOUT ALCOHOL: MODERATE CHANCE OF DOZING
HOW LIKELY ARE YOU TO NOD OFF OR FALL ASLEEP WHEN YOU ARE A PASSENGER IN A CAR FOR AN HOUR WITHOUT A BREAK: WOULD NEVER DOZE
HOW LIKELY ARE YOU TO NOD OFF OR FALL ASLEEP IN A CAR, WHILE STOPPED FOR A FEW MINUTES IN TRAFFIC: WOULD NEVER DOZE
HOW LIKELY ARE YOU TO NOD OFF OR FALL ASLEEP WHILE LYING DOWN TO REST IN THE AFTERNOON WHEN CIRCUMSTANCES PERMIT: HIGH CHANCE OF DOZING
HOW LIKELY ARE YOU TO NOD OFF OR FALL ASLEEP WHILE WATCHING TV: MODERATE CHANCE OF DOZING
HOW LIKELY ARE YOU TO NOD OFF OR FALL ASLEEP WHILE SITTING AND TALKING TO SOMEONE: WOULD NEVER DOZE
HOW LIKELY ARE YOU TO NOD OFF OR FALL ASLEEP WHILE SITTING AND READING: HIGH CHANCE OF DOZING

## 2022-03-31 ASSESSMENT — PAIN SCALES - GENERAL: PAINLEVEL: NO PAIN (0)

## 2022-03-31 NOTE — PROGRESS NOTES
Javi is a 51 year old who is being evaluated via a billable video visit.      How would you like to obtain your AVS? MyChart  If the video visit is dropped, the invitation should be resent by: Text to cell phone: [phone]  Will anyone else be joining your video visit? Elizabeth Brizuela    Video Start Time: 3:02 PM  Video-Visit Details    Type of service:  Video Visit    Video End Time:3:21 PM    Originating Location (pt. Location): Home    Distant Location (provider location):  [facility]     Platform used for Video Visit: [employer]     Additional 15 minutes on the date of service was spent performing the following:    -Preparing to see the patient  -Obtaining and/or reviewing separately obtained history   -Ordering medications, tests, or procedures   -Documenting clinical information in the electronic or other health record     Thank you for the opportunity to participate in the care of  Javi Salmeron.    Assessment and Plan:    In summary Javi Salmeron is a 51 year old year old male here for sleep disturbance.  1. Witness apnea/Non-restorative sleep/Snoring/Sleep related bruxism   Javi Salmeron has high risk for obstructive sleep apnea based on the history of witness apnea, non-restorative sleep , snoring and a crowded airway. I educated the patient on the underlying pathophysiology of obstructive sleep apnea. We reviewed the risks associated with sleep apnea, including increased cardiovascular risk and overall death. We talked about treatments briefly. I recommend getting an baseline nocturnal polysomnography. The patient should return to the clinic to discuss results and treatment option in a patient-centered approach.    Lab reviewed: Discussed with patient.    History of present illness:    He is a 51 year old male who comes to the virtual clinic with a chief complaint of nonrestorative sleep has been going on for more than a year.  His spouse has informed him that he has  "significant pauses in his breathing during sleep followed by loud snoring.  During a recent elective surgical procedure, he was informed that he snores very loudly at postop and to have this evaluated.  The patient also admits to having grinding of the teeth during sleep.     Ideal Sleep-Wake Cycle(devoid of societal pressure):    Patient would try to initiate sleep at around 11-11:30 PM with a sleep latency of 30 minutes. The patient would have 3 awakenings. Final wake up time is around 9:30 AM.      IRENE:  IRENE Total Score: 15  Total score - [place]: 10 (3/31/2022  2:32 PM)    Patient told to return in one week after the sleep study is interpreted.    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 Martinez     Current Outpatient Medications   Medication Sig Dispense Refill     amLODIPine (NORVASC) 10 MG tablet TAKE 1 TABLET(10 MG) BY MOUTH DAILY 60 tablet 0     atorvastatin (LIPITOR) 10 MG tablet Take 1 tablet (10 mg) by mouth daily 90 tablet 3     citalopram (CELEXA) 40 MG tablet Take 1 tablet (40 mg) by mouth every morning 90 tablet 3     hydrochlorothiazide (HYDRODIURIL) 25 MG tablet Take 1 tablet (25 mg) by mouth daily 90 tablet 3     magnesium oxide (MAG-OX) 400 (241.3 Mg) MG tablet " Take 1 tablet (400 mg) by mouth At Bedtime 90 tablet 3     naltrexone (DEPADE/REVIA) 50 MG tablet Take 1 tablet (50 mg) by mouth daily 90 tablet 3     Dust mites, Grass, Ragweeds, and Codeine  Social History     Socioeconomic History     Marital status:      Spouse name: Not on file     Number of children: Not on file     Years of education: Not on file     Highest education level: Not on file   Occupational History     Not on file   Tobacco Use     Smoking status: Never Smoker     Smokeless tobacco: Never Used   Substance and Sexual Activity     Alcohol use: Yes     Comment: one or two a month     Drug use: No     Sexual activity: Yes     Partners: Female     Birth control/protection: Male Surgical   Other Topics Concern     Not on file   Social History Narrative    Lives in [place].    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     since about 2013.         3 sons,: Oldest b 2001, and Twins b 2004.      Social Determinants of Health     Financial Resource Strain: Not on file   Food Insecurity: Not on file   Transportation Needs: Not on file   Physical Activity: Not on file   Stress: Not on file   Social Connections: Not on file   Intimate Partner Violence: Not on file   Housing Stability: Not on file     Family History   Problem Relation Age of Onset     Anxiety Disorder Maternal Grandfather      Mental Illness Father      Depression Father      Obesity Father      Alcoholism Father      Depression Mother      Hyperlipidemia Mother      Diabetes Paternal Grandfather      Cerebrovascular Disease Paternal Grandfather      Macular Degeneration No family hx of      Glaucoma No family hx of         Physical Exam:  GEN: NAD,   Head: Normocephalic.  EYES: EOMI  ENT: Oropharynx is clear, Conner class 4+ airway.   Psych: normal mood, normal affect  Snore test:(+)     Labs/Studies:     No results found for: PH, PHARTERIAL, PO2, [id number], SAT, PCO2, HCO3, BASEEXCESS, JAREN, BEB  Lab Results   Component  Value Date    TSH 0.99 12/31/2020     Lab Results   Component Value Date     (H) 11/15/2021    .0 (H) 12/31/2020     No results found for: HGB  Lab Results   Component Value Date    BUN 23 11/15/2021    BUN 19.0 12/31/2020    CR 1.06 11/15/2021    CR 1.0 12/31/2020     Lab Results   Component Value Date    AST 13 11/15/2021    AST 28.0 12/31/2020    ALT 26 11/15/2021    ALT 24.0 12/31/2020    ALKPHOS 59 11/15/2021    ALKPHOS 72.0 12/31/2020    BILITOTAL 0.5 11/15/2021    BILITOTAL 0.9 12/31/2020     No results found for: UAMP, UBARB, BENZODIAZEUR, UCANN, UCOC, OPIT, UPCP    Recent Labs   Lab Test 11/15/21  1110 12/31/20  1434    144.0   POTASSIUM 3.3* 3.6   CHLORIDE 99 102.0   CO2 30 32.0   ANIONGAP 8  --    * 112.0*   BUN 23 19.0   CR 1.06 1.0   GIOVANNA 9.4 9.2       No results found for: STANISLAV Valdez DO  Board Certified in Internal Medicine and Sleep Medicine    (Note created with Dragon voice recognition and unintended spelling errors and word substitutions may occur)

## 2022-03-31 NOTE — PATIENT INSTRUCTIONS
Patient education: What is a sleep study?     What is a sleep study? -- A sleep study is a test that measures how well you sleep and checks for sleep problems. For some sleep studies, you stay overnight in a sleep lab at a hospital or sleep center.     What happens during a sleep study? -- Before you go to sleep, a technician attaches small, sticky patches called  electrodes  to your head, chest, and legs. He or she will also place a small tube beneath your nose and might wrap 1 or 2 belts around your chest.   Each of these items has wires that connect to monitors. The monitors record your movement, brain activity, breathing, and other body functions while you sleep.  If you have a history of trouble falling asleep, your doctor might prescribe a medicine to help you fall asleep in the lab. If you have never taken the medicine before, your doctor might ask you take it on a night before your sleep study to see how it affects you.   Why might my doctor order a sleep study? -- Your doctor will order a sleep study if he or she thinks you have sleep apnea or a different condition that makes you:   ?Have sudden jerking leg movements while you sleep, called  periodic limb movements.    ?Feel very sleepy during the day and fall asleep all of a sudden, called  narcolepsy.    ?Have trouble falling asleep or staying asleep over a long period of time, called  chronic insomnia.    ?Do odd things while you sleep, such as walking.  How should I prepare for a sleep study? -- On the day of your sleep study, you should:   ?Avoid alcohol   ?Avoid drinking coffee, tea, sodas, and other drinks that have caffeine in the afternoon and evening   ?Take all of your regular medicines     The cost of care estimate line is [phone]. They are able to give the patient an estimate of the charges and also an estimate of their insurance coverage/patient responsibility.   After your sleep study is performed, please call us at [phone] or  [phone]  to schedule for a follow up to review the results of the sleep study.    Please bring one tab of low dose melatonin 3 mg or less to the night of the study.    Melatonin intake is completely voluntary.    You may take own melatonin after arrival to sleep center. Do not drive or operate machinery after intake of melatonin.

## 2022-03-31 NOTE — TELEPHONE ENCOUNTER
Hydrochlorothiazide (Hydrodiuril) 25 mg    Last Office Visit: 11/15/21  [place] [facility] [place]  Medication last refilled: 2/10/21 #90 with 3 refill(s)    Vital Signs 12/21/2020 1/18/2021 11/15/2021   Systolic 140 122 141   Diastolic 88 75 87     Required labs per protocol:    DRUG REF RANGE 12/31/20 11/15/21   Creatinine 0.8-1.25 mg/dL 1.0 1.06   Potassium 3.4-5.3 mmol/L 3.6 3.3 Low    Sodium 137.3-146.3 mmol/L 144 137     Routing refill request to provider for review/approval because:  Labs out of range:  Low Potassium    Francia Lopez, RN, BSN     [FreeTextEntry1] : DOLLY AHN is a 11 yo healthy boy here for initial evaluation of dizziness spells\par \par HPI\par Started happening in the last few weeks. Every time he stands up fast or gets up fast from bed, he feels lightheadedness for a few seconds and then it goes away. Witnesses says he sometimes looks pale when this happens. Denies palpitations, sweat, or visual changes. Has never passed out. Never happens when sitting or lying down. Does not last more than few seconds. Sometimes gets a headache like a pressure on top of the head that stays for few minutes. \par \par Saw cardiologist- EKG showed LV Hyperytrophy not present in Echo. Echo normal. Episodes thoughht to be secondary to dehydration\par \par Mom also reports he is struggling at school due to inattention and would like an evaluation for ADHD. \par \par PMHx otherwise unremarkable. Normal  and development\par Being studied for celiac disease due to green stools and bloating\par No other medical conditions\par \par FHx neg for NRL issues\par

## 2022-04-04 ENCOUNTER — VIRTUAL VISIT (OUTPATIENT)
Dept: PSYCHOLOGY | Facility: CLINIC | Age: 52
End: 2022-04-04
Payer: COMMERCIAL

## 2022-04-04 ENCOUNTER — VIRTUAL VISIT (OUTPATIENT)
Dept: PSYCHOLOGY | Facility: CLINIC | Age: 52
End: 2022-04-04

## 2022-04-04 DIAGNOSIS — Z51.89 AFTERCARE: Primary | ICD-10-CM

## 2022-04-04 DIAGNOSIS — F34.1 DYSTHYMIC DISORDER: Primary | ICD-10-CM

## 2022-04-04 PROCEDURE — 99207 PR NO CHARGE LOS: CPT | Performed by: PSYCHOLOGIST

## 2022-04-04 PROCEDURE — 99207 PR NO CHARGE LOS: CPT

## 2022-04-04 PROCEDURE — 90834 PSYTX W PT 45 MINUTES: CPT | Mod: GT | Performed by: PSYCHOLOGIST

## 2022-04-04 PROCEDURE — 99207 PR CSH SUPPORT GROUP 120 MINUTES: CPT | Mod: 95

## 2022-04-04 NOTE — PROGRESS NOTES
[facility]  Case Progress Note    4/04/22    Client Name: Javi Salmeron  YOB: 1970  MRN:  [id number]   Treating Provider: Connie Baugh, PhD  Type of Session: Individual  Number of Minutes: 45    Health Maintenance Summary - Mental Health Treatment Plan     This patient has no relevant Health Maintenance data.      Video start time: 3:10  Video end time: 3:55    Telemedicine Visit: The patient's condition can be safely assessed and treated via synchronous audio and visual telemedicine encounter.      Reason for Telemedicine Visit: Services only offered telehealth - COVID    Originating Site (Patient Location): Patient's home    Distant Site (Provider Location): [facility] Clinics: [facility]    Consent:  The patient/guardian has verbally consented to: the potential risks and benefits of telemedicine (video visit) versus in person care; bill my insurance or make self-payment for services provided; and responsibility for payment of non-covered services.     Mode of Communication:  Video Conference via Zoom    As the provider I attest to compliance with applicable laws and regulations related to telemedicine.      Current Symptoms/Status:  Not having issues with CSB.  Recent break up - and dealing with grief.    Progress Toward Treatment Goals:   Using support system.       Intervention: Modality and Description:  Individual, interpersonal, CBT    MENTAL STATUS EXAM  Appearance: appropriate  Attitude: cooperative  Behavior: normal  Eyre Contact: normal  Speech: normal  Orientation: oreinted to person , place, time and situation  Mood:  admits sadness and anxiety most of time  Affect: Mood Congruient  Thought Process: clear  Suicidal Ideation: reports thoughts, no intention  Hallucination: no    Response to Intervention:  Processed recent vacation.  Caused some issues with girlfriend  Worled through those things.    Enjoying book on attachment.  Girlfriend is reading it too.    Using  support system - and handling grief.     Assignment:  Stay within boundaries.      Read Attached.    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  See in one month.  Monthly aftercare support group.     TREATMENT PLAN  Date of Treatment Plan 21  Name: Javi Salmeron  : 1970  Medical Record Number: [id number]  Treating Provider: Connie Baugh, PhD  Type of Session: Individual  Present in Session: Patient.    Current Status:  Depression/Mood:  Reports no problems or symptoms at this time    Anxiety/Panic:  Reports no problems or symptoms at this time    Thought:   Reports no problems or symptoms at this time    Sensorium:  Reports no problems or symptoms at this time    Behavior/Health:  Reports no problems or symptoms at this time    Chemical Use:  Denies both Alcohol and Drug Abuse    Sexual Problems:  Hx of compulsive sexual behavior.    Suicide Risk Assessment:  Assessed Level of Immediate Risk:  YES  Ideation:  NO  Plan:  NO  Means:  NO  Intent:  NO    Homicide Risk Assessment:  Assessed Level of Immediate Risk:  YES  Ideation:  NO  Plan:  NO  Means:  NO  Intent:  NO    Impact of Symptoms on Function:  No Current Impact of Symptoms on Function    DSM-5 Diagnoses:   Diagnoses       Codes Comments    Conduct disorder, undifferentiated type    -  Primary F91.8     Dysthymic disorder     F34.1           Screening Questionnaires:  Please indicate whether the following screening questionnaires have been completed:  CAGE: No  PHQ-9: No    RAGINI-7: No  Safety Screening: No  WHODAS: No    Problem(s):  1. Dysthymia  2. Hx of compulsive sexual behavior.      Short-Long Term Goals  Increase Coping  Monitor Psych Status  Enhance Relationships    Interventions  [facility]  Cognitive-Behavioral Therapy  Aftercare support group    Expected Outcomes and  Prognosis  Expected improvement    Anticipated Treatment Duration:  Unknown    Frequency of Sessions  1X 4-6 months  Monthly aftercare support grouip    Progress Update (for Plan Update Only)  Compliant, Progressing and Improving - Needs More Sessions    Current Psychoactive Medications:  See Psychiatric Treatment Plan  Discharge & Aftercare Goals: To Be Determined.  Care Coordination: No    Consent to Treatment:   Patient participated in this treatment planning process and indicated verbal agreement with the above treatment plan.  If patient doesn't sign, indicate why: Telehealth visit due to COVID19 pandemic    Patient Signature: Javi Salmeron  Date: 4-5-21    Provider Signature: Connie Baugh, PhD  Date: 4-5-21

## 2022-04-04 NOTE — NURSING NOTE
has scheduled patient for PSG at [facility] and follow up with Dr. Valdez.  sent the PSG package to patient via [employer]. No further actions needed.

## 2022-04-06 ENCOUNTER — TELEPHONE (OUTPATIENT)
Dept: SLEEP MEDICINE | Facility: CLINIC | Age: 52
End: 2022-04-06
Payer: COMMERCIAL

## 2022-04-06 DIAGNOSIS — G47.9 SLEEP DISTURBANCE: Primary | ICD-10-CM

## 2022-04-06 NOTE — TELEPHONE ENCOUNTER
Received following email study date 22 please review:    Peer to Peer Request    Patient Name:                        Javi Salmeron  :                                      1970  MRN:                                      [id number]        Dr. PATTEN,    The authorization for procedure (description) PSG DIAGNSOTIC on date of service 2022 has been denied. We were unsuccessful in obtaining approval through clinical review. A osum-dw-zrmc review can be done by calling the insurance/third party authorization vendor with the following information:    Insurance:         Auth Vendor:     Phone:             [phone]  Due:                ASAP    Clinicals already Provided  Order:                          na  Visit Notes:                  2022  Results:                       N/A  Other:                          N/A    Patient ID:       [id number]  Case/Ref #:     [id number]    Patient contact:           N/A  Patient response:        NA  Patient Phone #:         NA    Denial Reason: PT LACKS QUALIFYING COMORBID CONDITIONS THAT MAY DEGRADE THE ACCURACY OF AN HST AND NO PREVIOUS HST HAS BEEN ATTEMPTED. PT MEETS REVIEW CRITERIA FOR HST AT THIS TIME      If you feel you have additional clinical information that could get this denial overturned, please complete the Peer to Peer.  If you choose not to do the P2P, please let me know as soon as possible so that we can reach out to the patient and advise them of their denial.      Thank you,    MAINOR NOVOA  [employer] [facility]        Mainor Robertson  Financial Securing Representative.Sleep Medicine.  [facility] [employer]  [email or web address]  Office: [phone]  Fax [phone]  Employed by [facility] services  Business Hours M-F  6:30AM-3:00PM  [address] [place] [address]

## 2022-04-24 NOTE — PROGRESS NOTES
[facility]  Case Progress Note    4/04/22    Client Name: Javi Salmeron  YOB: 1970  MRN:  [id number]   Treating Provider: Connie Baugh, PhD  Type of Session: Aftercare group  Number of Minutes: 120    Health Maintenance Summary - Mental Health Treatment Plan     This patient has no relevant Health Maintenance data.      Video start time: 4:30  Video end time: 6:30    Telemedicine Visit: The patient's condition can be safely assessed and treated via synchronous audio and visual telemedicine encounter.      Reason for Telemedicine Visit: Services only offered telehealth - COVID    Originating Site (Patient Location): Patient's home    Distant Site (Provider Location): [facility] Clinics: [facility]    Consent:  The patient/guardian has verbally consented to: the potential risks and benefits of telemedicine (video visit) versus in person care; bill my insurance or make self-payment for services provided; and responsibility for payment of non-covered services.     Mode of Communication:  Video Conference via Zoom    As the provider I attest to compliance with applicable laws and regulations related to telemedicine.      Current Symptoms/Status:  Not having issues with CSB.       Progress Toward Treatment Goals:   Using support system.       Intervention: Modality and Description:  Support group.    MENTAL STATUS EXAM  Appearance: appropriate  Attitude: cooperative  Behavior: normal  Eyre Contact: normal  Speech: normal  Orientation: oreinted to person , place, time and situation  Mood:  admits sadness and anxiety most of time  Affect: Mood Congruient  Thought Process: clear  Suicidal Ideation: none.  Hallucination: no    Response to Intervention:  Processed relationhsip with girlfirend.  Things have improved.    Continued to read attachment book.    Using support system     Assignment:  Stay within boundaries.      Read Attached.    Diagnosis:  300.4 Dysthymia  312.89 (F91.8) Other  Specified Disruptive, Impulse Control, and Conduct Disorder (Hypersexual Disorder)      Plan / Need for Future Services:  Continue individual therapy.  See in one month.  Monthly aftercare support group.     TREATMENT PLAN  Date of Treatment Plan 21  Name: Javi ANAND: 1970  Medical Record Number: [id number]  Treating Provider: Connie Baugh, PhD  Type of Session: Individual  Present in Session: Patient.    Current Status:  Depression/Mood:  Reports no problems or symptoms at this time    Anxiety/Panic:  Reports no problems or symptoms at this time    Thought:   Reports no problems or symptoms at this time    Sensorium:  Reports no problems or symptoms at this time    Behavior/Health:  Reports no problems or symptoms at this time    Chemical Use:  Denies both Alcohol and Drug Abuse    Sexual Problems:  Hx of compulsive sexual behavior.    Suicide Risk Assessment:  Assessed Level of Immediate Risk:  YES  Ideation:  NO  Plan:  NO  Means:  NO  Intent:  NO    Homicide Risk Assessment:  Assessed Level of Immediate Risk:  YES  Ideation:  NO  Plan:  NO  Means:  NO  Intent:  NO    Impact of Symptoms on Function:  No Current Impact of Symptoms on Function    DSM-5 Diagnoses:   Diagnoses       Codes Comments    Conduct disorder, undifferentiated type    -  Primary F91.8     Dysthymic disorder     F34.1           Screening Questionnaires:  Please indicate whether the following screening questionnaires have been completed:  CAGE: No  PHQ-9: No    RAGINI-7: No  Safety Screening: No  WHODAS: No    Problem(s):  1. Dysthymia  2. Hx of compulsive sexual behavior.      Short-Long Term Goals  Increase Coping  Monitor Psych Status  Enhance Relationships    Interventions  [facility]  Cognitive-Behavioral Therapy  Aftercare support group    Expected Outcomes and Prognosis  Expected improvement    Anticipated Treatment Duration:  Unknown    Frequency of Sessions  1X 4-6 months  Monthly aftercare support  [facility]    Progress Update (for Plan Update Only)  Compliant, Progressing and Improving - Needs More Sessions    Current Psychoactive Medications:  See Psychiatric Treatment Plan  Discharge & Aftercare Goals: To Be Determined.  Care Coordination: No    Consent to Treatment:   Patient participated in this treatment planning process and indicated verbal agreement with the above treatment plan.  If patient doesn't sign, indicate why: Telehealth visit due to COVID19 pandemic    Patient Signature: Javi Salmeron  Date: 4-5-21    Provider Signature: Connie Baugh, PhD  Date: 4-5-21

## 2022-05-02 ENCOUNTER — APPOINTMENT (OUTPATIENT)
Dept: PSYCHOLOGY | Facility: CLINIC | Age: 52
End: 2022-05-02

## 2022-05-06 ENCOUNTER — VIRTUAL VISIT (OUTPATIENT)
Dept: SLEEP MEDICINE | Facility: CLINIC | Age: 52
End: 2022-05-06
Attending: INTERNAL MEDICINE
Payer: COMMERCIAL

## 2022-05-06 DIAGNOSIS — G47.9 SLEEP DISTURBANCE: ICD-10-CM

## 2022-05-06 NOTE — PROGRESS NOTES
Device has been registered and shipped via [employer] on 5/6/2022. Patient was notified that package was mailed out.

## 2022-05-11 ENCOUNTER — DOCUMENTATION ONLY (OUTPATIENT)
Dept: SLEEP MEDICINE | Facility: CLINIC | Age: 52
End: 2022-05-11
Payer: COMMERCIAL

## 2022-05-11 PROCEDURE — 95800 SLP STDY UNATTENDED: CPT | Performed by: INTERNAL MEDICINE

## 2022-05-12 NOTE — PROGRESS NOTES
Watch Pat has been scored using rule 1B, 4%.  Patient to follow up with provider to determine appropriate therapy.    PAT AHI: 20.8    Ordering Provider: Dr. Guzman

## 2022-05-15 NOTE — PROCEDURES
"WatchPAT - HOME SLEEP STUDY INTERPRETATION    Patient: Javi Salmeron  MRN: [id number]  YOB: 1970  Study Date: 5/11/2022  Referring Provider: Mt Becker  Ordering Provider: Mika Valdez DO    Chain of custody patient verification was not enabled.  Chain of custody verification was not present throughout the entire study.     Indications for Home Study: Javi Salmeron is a 51 year old male with symptoms suggestive of obstructive sleep apnea.    Estimated body mass index is 26.78 kg/m  as calculated from the following:    Height as of 3/31/22: 1.803 m (5' 11\").    Weight as of 3/31/22: 87.1 kg (192 lb).  Total score - [place]: 10 (3/31/2022  2:32 PM)    Data: A full night home sleep study was performed recording the standard physiologic parameters including peripheral arterial tonometry (PAT), sound/snoring, body position,  movement, sound, and oxygen saturation by pulse oximetry. Pulse rate was estimated by oximetry recording. Sleep staging (wake, REM, light, and deep sleep) was derived from PAT signal.  This study was considered adequate based on > 4 hours of quality oximetry and respiratory recording. As specified by the AASM Manual for the Scoring of Sleep and Associated events, version 2.3, Rule VIII.D 1B, 4% oxygen desaturation scoring for hypopneas is used as a standard of care on all home sleep apnea testing.    Total Recording Time: 7 hrs, 4 min  Total Sleep Time: 6 hrs, 21 min  % of Sleep Time REM: 12.7%    Respiratory:  Snoring: Snoring was present.  Respiratory events: The PAT respiratory disturbance index [pRDI] was 23.6 events per hour.  The PAT apnea/hypopnea index [pAHI] was 20.8 events per hour.  RAMILA was 20.7 events per hour.  During REM sleep the pAHI was 2.5.  Sleep Associated Hypoxemia: sustained hypoxemia was present. Mean oxygen saturation was 93%.  Minimum was 82%.  Time with saturation less than 88% was 8.1 minutes.    Heart Rate: By pulse oximetry normal rate was noted. "     Position: Percent of time spent: supine -49.5%, prone -0%, on right -36.9%, on left -13.6%.  pAHI was 39.7 per hour supine, n/a  prone, 1.8 per hour on right side, and 2.3 per hour on left side.     Assessment:     Moderate obstructive sleep apnea predominant during supine sleep position.     Sleep associated hypoxemia was present.    Recommendations:    Consider positional therapy during sleep with or without dental appliance.     Suggest optimizing sleep hygiene and avoiding sleep deprivation.    Weight management.    Diagnosis Code(s): Obstructive Sleep Apnea G47.33, Hypoxemia G47.36, Snoring R06.83    Lu Maza MD, May 15, 2022   Diplomate, American Board of Internal Medicine, Sleep Medicine

## 2022-06-06 ENCOUNTER — OFFICE VISIT (OUTPATIENT)
Dept: PSYCHOLOGY | Facility: CLINIC | Age: 52
End: 2022-06-06
Payer: COMMERCIAL

## 2022-06-06 ENCOUNTER — VIRTUAL VISIT (OUTPATIENT)
Dept: SLEEP MEDICINE | Facility: CLINIC | Age: 52
End: 2022-06-06
Payer: COMMERCIAL

## 2022-06-06 VITALS — WEIGHT: 195 LBS | BODY MASS INDEX: 26.41 KG/M2 | HEIGHT: 72 IN

## 2022-06-06 DIAGNOSIS — F91.8 CONDUCT DISORDER, UNDIFFERENTIATED TYPE: ICD-10-CM

## 2022-06-06 DIAGNOSIS — G47.33 OBSTRUCTIVE SLEEP APNEA: Primary | ICD-10-CM

## 2022-06-06 DIAGNOSIS — F34.1 DYSTHYMIC DISORDER: Primary | ICD-10-CM

## 2022-06-06 PROCEDURE — 90853 GROUP PSYCHOTHERAPY: CPT

## 2022-06-06 PROCEDURE — 99207 PR NO CHARGE LOS: CPT

## 2022-06-06 PROCEDURE — 99213 OFFICE O/P EST LOW 20 MIN: CPT | Mod: GT | Performed by: INTERNAL MEDICINE

## 2022-06-06 ASSESSMENT — SLEEP AND FATIGUE QUESTIONNAIRES
HOW LIKELY ARE YOU TO NOD OFF OR FALL ASLEEP WHILE LYING DOWN TO REST IN THE AFTERNOON WHEN CIRCUMSTANCES PERMIT: HIGH CHANCE OF DOZING
HOW LIKELY ARE YOU TO NOD OFF OR FALL ASLEEP WHILE SITTING QUIETLY AFTER LUNCH WITHOUT ALCOHOL: MODERATE CHANCE OF DOZING
HOW LIKELY ARE YOU TO NOD OFF OR FALL ASLEEP WHILE SITTING INACTIVE IN A PUBLIC PLACE: SLIGHT CHANCE OF DOZING
HOW LIKELY ARE YOU TO NOD OFF OR FALL ASLEEP IN A CAR, WHILE STOPPED FOR A FEW MINUTES IN TRAFFIC: WOULD NEVER DOZE
HOW LIKELY ARE YOU TO NOD OFF OR FALL ASLEEP WHILE SITTING AND READING: HIGH CHANCE OF DOZING
HOW LIKELY ARE YOU TO NOD OFF OR FALL ASLEEP WHILE WATCHING TV: WOULD NEVER DOZE
HOW LIKELY ARE YOU TO NOD OFF OR FALL ASLEEP WHEN YOU ARE A PASSENGER IN A CAR FOR AN HOUR WITHOUT A BREAK: SLIGHT CHANCE OF DOZING
HOW LIKELY ARE YOU TO NOD OFF OR FALL ASLEEP WHILE SITTING AND TALKING TO SOMEONE: WOULD NEVER DOZE

## 2022-06-06 ASSESSMENT — PAIN SCALES - GENERAL: PAINLEVEL: NO PAIN (0)

## 2022-06-06 NOTE — PATIENT INSTRUCTIONS
"Equipment Instructions    We will process your PAP order and send it to a Durable Medical Equipment (DME) provider.    The medical equipment company should call you within 7 days.  If you have not heard from the company, please contact them to see if they received your order and are planning to call you.    Please call us at [phone] if you are unable to contact the medical equipment company or if they do not have the order.    If you are starting a new PAP machine, please call us after you use it the first night to let us know how it went. This call also helps us know that you received your equipment and that everything is ready. Please use our central phone number [phone]    Contact information for [email or web address] company:    [employer] Tel: [phone]         What is sleep apnea?     Sleep apnea is a condition that makes you stop breathing for short periods while you are asleep. There are 2 types of sleep apnea. One is called \"obstructive sleep apnea,\" and the other is called \"central sleep apnea.\"  In obstructive sleep apnea, you stop breathing because your throat narrows or closes (figure 1). In central sleep apnea, you stop breathing because your brain does not send the right signals to your muscles to make you breathe. When people talk about sleep apnea, they are usually referring to obstructive sleep apnea, which is what this article is about.  People with sleep apnea do not know that they stop breathing when they are asleep. But they do sometimes wake up startled or gasping for breath. They also often hear from loved ones that they snore.  What are the symptoms of sleep apnea? -- The main symptoms of sleep apnea are loud snoring, tiredness, and daytime sleepiness. Other symptoms can include:  ?Restless sleep  ?Waking up choking or gasping  ?Morning headaches, dry mouth, or sore throat  ?Waking up often to urinate  ?Waking up feeling unrested or groggy  ?Trouble thinking clearly or remembering " "things  Some people with sleep apnea don't have symptoms, or they don't know they have them. They might figure that it's normal to be tired or to snore a lot.  Should I see a doctor or nurse? -- Yes. If you think you might have sleep apnea, see your doctor.  Is there a test for sleep apnea? -- Yes. If your doctor or nurse suspects you have sleep apnea, he or she might send you for a \"sleep study.\" Sleep studies can sometimes be done at home, but they are usually done in a sleep lab. For the study, you spend the night in the lab, and you are hooked up to different machines that monitor your heart rate, breathing, and other body functions. The results of the test will tell your doctor or nurse if you have the disorder.  Is there anything I can do on my own to help my sleep apnea? -- Yes. Here are some things that might help:  ?Stay off your back when sleeping. (This is not always practical, because people cannot control their position while asleep. Plus, it only helps some people.)  ?Lose weight, if you are overweight  ?Avoid alcohol, because it can make sleep apnea worse  How is sleep apnea treated? -- The most effective treatment for sleep apnea is a device that keeps your airway open while you sleep. Treatment with this device is called \"continuous positive airway pressure,\" or CPAP. People getting CPAP wear a face mask at night that keeps them breathing (figure 2).  If your doctor or nurse recommends a CPAP machine, try to be patient about using it. The mask might seem uncomfortable to wear at first, and the machine might seem noisy, but using the machine can really pay off. People with sleep apnea who use a CPAP machine feel more rested and generally feel better.  There is also another device that you wear in your mouth called an \"oral appliance\" or \"mandibular advancement device.\" It also helps keep your airway open while you sleep.  In rare cases, when nothing else helps, doctors recommend surgery to keep the " airway open. Surgery to do this is not always effective, and even when it is, the problem can come back.  Is sleep apnea dangerous? -- It can be. People with sleep apnea do not get good-quality sleep, so they are often tired and not alert. This puts them at risk for car accidents and other types of accidents. Plus, studies show that people with sleep apnea are more likely than others to have high blood pressure, heart attacks, and other serious heart problems. In people with severe sleep apnea, getting treated (for example, with a CPAP machine) can help prevent some of these problems.     GRAPHICS  Airway in a person with sleep apnea    Normally when a person sleeps, the airway remains open, and air can pass from the nose and mouth to the lungs. In a person with sleep apnea, parts of the throat and mouth drop into the airway and block off the flow of air. This can cause loud snoring and interrupt breathing for short periods.  Graphic 74684 Version 5.0      Continuous positive airway pressure (CPAP) for sleep apnea    The CPAP mask gently blows air into your nose while you sleep. It puts just enough pressure on your airway to keep it from closing. The mask in this picture fits over just the nose. Other CPAP devices have masks that fit over the nose and mouth.

## 2022-06-06 NOTE — NURSING NOTE
CPAP compliance instruction letter sent via [employer]. Durable medical equipment order routed to [facility].      KAROLINA Sigala  [facility]

## 2022-06-06 NOTE — PROGRESS NOTES
Javi is a 51 year old who is being evaluated via a billable video visit.      How would you like to obtain your AVS? MyChart  If the video visit is dropped, the invitation should be resent by: Text to cell phone: [phone]  Will anyone else be joining your video visit? Elizabeth    Shelley Mclain    Video Start Time: 10:58 AM  Video-Visit Details    Type of service:  Video Visit    Video End Time:11:06 AM    Originating Location (pt. Location): Home    Distant Location (provider location):  [facility]     Platform used for Video Visit: Voxbone     Additional 10 minutes on the date of service was spent performing the following:    -Preparing to see the patient (eg, review of tests)   -Ordering medications, tests, or procedures   -Documenting clinical information in the electronic or other health record     Thank you for the opportunity to participate in the care of Javi Salmeron.     He is a 51 year old  male patient who comes to the sleep medicine clinic for review of sleep study results. The study was completed on 05/11/22  which showed that the patient had moderate obstructive sleep apnea with an apnea hypopnea index of 20.8 events per hour.    Assessment and Plan:  In summary Javi Salmeron is a 51 year old year old male here for review of sleep study results.  1. Obstructive Sleep Apnea  We had an extensive conversation to review the results of his sleep study and to  him on the importance of treating sleep apnea. We discussed the options of treatment with oral appliance versus CPAP. Patient decided to proceed with CPAP. He will start using the device as soon as he receives it with the intention to use if for the entire night. We discussed some tips to increase PAP tolerance as well as the normal curve of adaptation. CPAP is going to provide improved respiratory function during the night but it can cause some sleep disruption that tends to improve with continuous usage. He should return  "to the clinic in 7 weeks to review compliance and efficacy monitoring. Since the patient does not have any preference, we will use [employer] as the durable medical equipment company.     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)      Major depression in complete remission (H)       Current Outpatient Medications   Medication Sig Dispense Refill     amLODIPine (NORVASC) 10 MG tablet TAKE 1 TABLET(10 MG) BY MOUTH DAILY 60 tablet 0     atorvastatin (LIPITOR) 10 MG tablet Take 1 tablet (10 mg) by mouth daily 90 tablet 3     citalopram (CELEXA) 40 MG tablet Take 1 tablet (40 mg) by mouth every morning 90 tablet 3     hydrochlorothiazide (HYDRODIURIL) 25 MG tablet Take 1 tablet (25 mg) by mouth daily 90 tablet 3     magnesium oxide (MAG-OX) 400 (241.3 Mg) MG tablet Take 1 tablet (400 mg) by mouth At Bedtime 90 tablet 3     naltrexone (DEPADE/REVIA) 50 MG tablet Take 1 tablet (50 mg) by mouth daily 90 tablet 3       Allergies   Allergen Reactions     Dust Mites      Grass      Ragweeds      Codeine Anxiety     \"gets loopy\" - doesn't like how it feels       Physical Exam:  GEN: NAD  Psych: normal mood, normal affect     Labs/Studies:  - We reviewed the results of the overnight study as described on the HPI.       Patient verbalized understanding of these issues, agrees with the plan and all questions were answered today. Patient was given an opportuntity to voice any other symptoms or concerns not listed above. Patient did not have any other symptoms or concerns.      Mika Valdez DO  Board Certified in Internal Medicine and Sleep Medicine      (Note created with Dragon voice recognition and unintended spelling errors " and word substitutions may occur)

## 2022-06-06 NOTE — PROGRESS NOTES
[facility]  Case Progress Note    22    Client Name: Javi Salmeron  YOB: 1970  MRN:  [id number]   Treating Provider: Connie Baugh PhD  Type of Session: Group  Number of Minutes: 120    IN person       Current Symptoms/Status:  Depression reasonably well managed.  Not having issues with CSB.       Progress Toward Treatment Goals:   Using support system.       Intervention: Modality and Description:  Group therapy.  Interpersonal.  Supportive..    MENTAL STATUS EXAM  Appearance: appropriate  Attitude: cooperative  Behavior: normal  Eyre Contact: normal  Speech: normal  Orientation: oreinted to person , place, time and situation  Mood:  admits sadness and anxiety most of time  Affect: Mood Congruient  Thought Process: clear  Suicidal Ideation: none.  Hallucination: no    Response to Intervention:  Processed changes at work.  Creating some anxiety.     Using support system     Assignment:  Stay within boundaries.      Read Attached.    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  See in one month.  Monthly aftercare support group.     TREATMENT PLAN  Date of Treatment Plan 22  Name: Javi Salmeron  : 1970  Medical Record Number: [id number]  Treating Provider: Connie Baugh, PhD  Type of Session: Individual  Present in Session: Patient.    Current Status:  Depression/Mood:  Reports no problems or symptoms at this time    Anxiety/Panic:  Reports no problems or symptoms at this time    Thought:   Reports no problems or symptoms at this time    Sensorium:  Reports no problems or symptoms at this time    Behavior/Health:  Reports no problems or symptoms at this time    Chemical Use:  Denies both Alcohol and Drug Abuse    Sexual Problems:  Hx of compulsive sexual behavior.    Suicide Risk Assessment:  Assessed Level of  Immediate Risk:  YES  Ideation:  NO  Plan:  NO  Means:  NO  Intent:  NO    Homicide Risk Assessment:  Assessed Level of Immediate Risk:  YES  Ideation:  NO  Plan:  NO  Means:  NO  Intent:  NO    Impact of Symptoms on Function:  No Current Impact of Symptoms on Function    DSM-5 Diagnoses:   Diagnoses       Codes Comments    Conduct disorder, undifferentiated type    -  Primary F91.8     Dysthymic disorder     F34.1           Screening Questionnaires:  Please indicate whether the following screening questionnaires have been completed:  CAGE: No  PHQ-9: No    RAGINI-7: No  Safety Screening: No  WHODAS: No    Problem(s):  1. Dysthymia  2. Hx of compulsive sexual behavior.      Short-Long Term Goals  Increase Coping  Monitor Psych Status  Enhance Relationships    Interventions  [place] Psychotherapy  Cognitive-Behavioral Therapy  Aftercare support group    Expected Outcomes and Prognosis  Expected improvement    Anticipated Treatment Duration:  Unknown    Frequency of Sessions  1X 4-6 months  Monthly aftercare support xiomara    Progress Update (for Plan Update Only)  Compliant, Progressing and Improving - Needs More Sessions    Current Psychoactive Medications:  See Psychiatric Treatment Plan  Discharge & Aftercare Goals: To Be Determined.  Care Coordination: No    Consent to Treatment:   Patient participated in this treatment planning process and indicated verbal agreement with the above treatment plan.  If patient doesn't sign, indicate why: Telehealth visit due to COVID19 pandemic    Patient Signature: Javi Salmeron  Date: 6-6-22    Provider Signature: Connie Baugh, PhD  Date: 6-22-22

## 2022-06-10 ENCOUNTER — DOCUMENTATION ONLY (OUTPATIENT)
Dept: SLEEP MEDICINE | Facility: CLINIC | Age: 52
End: 2022-06-10
Payer: COMMERCIAL

## 2022-06-10 DIAGNOSIS — G47.33 OBSTRUCTIVE SLEEP APNEA (ADULT) (PEDIATRIC): Primary | ICD-10-CM

## 2022-06-10 NOTE — PROGRESS NOTES
Patient was offered choice of vendor and chose [facility].  Patient Javi Salmeron was set up at [place]  on Barbara 10, 2022. Patient received a Resmed Airsense 11 Pressures were set at 5-20 cm H2O.   Patient s ramp is 5 cm H2O for Auto and FLEX/EPR is EPR, 2.  Patient received a [employer] Mask name: Vitera  Full Face mask size Medium, heated tubing and heated humidifier.  Patient does not need to meet compliance.   RODERICK JOSE

## 2022-06-17 ENCOUNTER — DOCUMENTATION ONLY (OUTPATIENT)
Dept: SLEEP MEDICINE | Facility: CLINIC | Age: 52
End: 2022-06-17
Payer: COMMERCIAL

## 2022-06-17 ENCOUNTER — OFFICE VISIT (OUTPATIENT)
Dept: FAMILY MEDICINE | Facility: CLINIC | Age: 52
End: 2022-06-17
Payer: COMMERCIAL

## 2022-06-17 VITALS
HEART RATE: 76 BPM | WEIGHT: 200.5 LBS | DIASTOLIC BLOOD PRESSURE: 73 MMHG | TEMPERATURE: 98.2 F | SYSTOLIC BLOOD PRESSURE: 123 MMHG | RESPIRATION RATE: 12 BRPM | OXYGEN SATURATION: 95 % | BODY MASS INDEX: 27.19 KG/M2

## 2022-06-17 DIAGNOSIS — B35.1 ONYCHOMYCOSIS: Primary | ICD-10-CM

## 2022-06-17 PROCEDURE — 87220 TISSUE EXAM FOR FUNGI: CPT | Performed by: FAMILY MEDICINE

## 2022-06-17 RX ORDER — TERBINAFINE HYDROCHLORIDE 250 MG/1
250 TABLET ORAL DAILY
Qty: 90 TABLET | Refills: 0 | Status: SHIPPED | OUTPATIENT
Start: 2022-06-17 | End: 2023-06-19

## 2022-06-17 NOTE — PROGRESS NOTES
ASSESSMENT AND PLAN:     (B35.1) Onychomycosis  (primary encounter diagnosis)  Comment: Most likely diagnosis onychomycosis but did clip nail to send for KOH prep in lab. No real subungual debris to sample for this.  Had normal LFTs in November.  Advised of risks with terbinafine, especially in combination with his chronic naltrexone. Advised of high rates of treatment failure and need for re-treatment for nail fungus.   Update labs in 6 weeks.  Plan for 12 weeks course.   Plan: KOH skin hair or nails, Comprehensive metabolic        panel, terbinafine (LAMISIL) 250 MG tablet, KOH        skin hair or nails          Garfield Joseph MD   [facility]  06/17/2022, 4:30 PM      SUBJECTIVE:   Javi is a 51 year old male who presents to clinic today for a return visit.    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       I have reviewed the patient's relevant past medical history.     OBJECTIVE:   /73 (BP Location: Left arm, Patient Position: Sitting, Cuff Size: Adult Large)   Pulse 76   Temp 98.2  F (36.8  C) (Skin)   Resp 12   Wt 90.9 kg (200 lb 8 oz)   SpO2 95%   BMI  27.19 kg/m      Constitutional: well-appearing, appears stated age  Eyes: conjunctivae without erythema, sclera anicteric.     Toes: distal medial quadrant of both great toe nails darkened. 1st toe nails are both starting to separate from nail bed, lift easily. Nails are not particularly thickened. Minimal subungual debris.

## 2022-06-17 NOTE — NURSING NOTE
51 year old  Chief Complaint   Patient presents with     Nail Problem     Bilateral toenail fungus       Blood pressure 123/73, pulse 76, temperature 98.2  F (36.8  C), temperature source Skin, resp. rate 12, weight 90.9 kg (200 lb 8 oz), SpO2 95 %. Body mass index is 27.19 kg/m .  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)      Major depression in complete remission (H)       Wt Readings from Last 2 Encounters:   06/17/22 90.9 kg (200 lb 8 oz)   06/06/22 88.5 kg (195 lb)     BP Readings from Last 3 Encounters:   06/17/22 123/73   11/15/21 (!) 141/87   01/18/21 122/75         Current Outpatient Medications   Medication     amLODIPine (NORVASC) 10 MG tablet     atorvastatin (LIPITOR) 10 MG tablet     citalopram (CELEXA) 40 MG tablet     hydrochlorothiazide (HYDRODIURIL) 25 MG tablet     magnesium oxide (MAG-OX) 400 (241.3 Mg) MG tablet     naltrexone (DEPADE/REVIA) 50 MG tablet     No current facility-administered medications for this visit.       Social History     Tobacco Use     Smoking status: Never Smoker     Smokeless tobacco: Never Used   Substance Use Topics     Alcohol use: Yes     Comment: one or two a month     Drug use: No       Health Maintenance Due   Topic Date Due     ADVANCE CARE PLANNING  Never done     DEPRESSION ACTION PLAN  Never done     ZOSTER IMMUNIZATION (1 of 2) Never done     COVID-19 Vaccine (4 - Booster for Pfizer series) 06/23/2022       No results found for: PAP      June 17, 2022 4:26 PM

## 2022-06-18 LAB
KOH PREPARATION: NORMAL
KOH PREPARATION: NORMAL

## 2022-06-21 ENCOUNTER — DOCUMENTATION ONLY (OUTPATIENT)
Dept: SLEEP MEDICINE | Facility: CLINIC | Age: 52
End: 2022-06-21
Payer: COMMERCIAL

## 2022-06-21 NOTE — PROGRESS NOTES
3 day Sleep therapy management telephone visit    Diagnostic AHI: 20.8  PSG    Confirmed with patient at time of call- N/A Patient is still interested in STM service       Data only recheck        Objective data     Order Settings for PAP  CPAP min 5    CPAP max 20    CPAP fixed         Device settings from machine CPAP min 5.0     CPAP max 20.0      CPAP fixed      EPR Setting TWO    RESMED soft response  OFF     Assessment: Nighty usage most nights over four hours      Action plan: Patient to have 14 day STM visit. Patient has a follow up visit scheduled:   no    Replacement device: No  STM ordered by provider: Yes     Total time spent on accessing and  interpreting remote patient PAP therapy data  10 minutes    Total time spent counseling, coaching  and reviewing PAP therapy data with patient  0 minutes    [id number] no

## 2022-06-29 ENCOUNTER — DOCUMENTATION ONLY (OUTPATIENT)
Dept: SLEEP MEDICINE | Facility: CLINIC | Age: 52
End: 2022-06-29

## 2022-06-29 NOTE — PROGRESS NOTES
14  DAY STM VISIT    Diagnostic AHI: 20.8  PSG    Subjective measures:   Patient switched from the full face mask to a nasal mask. The nasal is working better but leaving marks on his forehead.     Assessment: Pt meeting objective benchmarks.  Patient meeting subjective benchmarks. mask discomfort,     Action plan: pt to have 30 day STM visit.  Patient will use a cut out T-Shirt to work as a barrier against the skin and CPAP mask.     Device type: Auto-CPAP    PAP settings: CPAP min 5.0 cm  H20       CPAP max 20.0 cm  H20      95th% pressure 13.6 cm  H20        RESMED EPR level Setting: TWO    RESMED Soft response setting:  OFF    Mask type:  Full Face Mask    Objective measures: 14 day rolling measures      Compliance  85 %      Leak  12.08  lpm  last  upload      AHI 11.86   last  upload      Average number of minutes 355      Objective measure goal  Compliance   Goal >70%  Leak   Goal < 24 lpm  AHI  Goal < 5  Usage  Goal >240        Total time spent on accessing and interpreting remote patient PAP therapy data  10 minutes    Total time spent counseling, coaching  and reviewing PAP therapy data with patient  12 minutes    [id number]  [id number]  no (3 day STM)

## 2022-06-30 ENCOUNTER — TRANSFERRED RECORDS (OUTPATIENT)
Dept: HEALTH INFORMATION MANAGEMENT | Facility: CLINIC | Age: 52
End: 2022-06-30

## 2022-07-06 DIAGNOSIS — I10 ESSENTIAL HYPERTENSION, BENIGN: ICD-10-CM

## 2022-07-06 RX ORDER — AMLODIPINE BESYLATE 10 MG/1
TABLET ORAL
Qty: 60 TABLET | Refills: 0 | Status: SHIPPED | OUTPATIENT
Start: 2022-07-06 | End: 2022-07-06

## 2022-07-06 RX ORDER — AMLODIPINE BESYLATE 10 MG/1
TABLET ORAL
Qty: 90 TABLET | Refills: 1 | Status: SHIPPED | OUTPATIENT
Start: 2022-07-06 | End: 2023-03-13

## 2022-07-06 NOTE — TELEPHONE ENCOUNTER
Last visit 6/17/22, no future visit  Resending - pt requests 90 day supply of alexis Valdez RN  [facility]

## 2022-07-06 NOTE — TELEPHONE ENCOUNTER
Medication requested: amLODIPine (NORVASC) 10 MG tablet  Last office visit: 6/17/2022  [facility] appointments: none  Medication last refilled: 10/29/2021; 60 + 0 refills  Last qualifying labs:     BP Readings from Last 3 Encounters:   06/17/22 123/73   11/15/21 (!) 141/87   01/18/21 122/75       KARI Ochoa, RN  07/06/22, 2:02 PM

## 2022-07-12 ENCOUNTER — DOCUMENTATION ONLY (OUTPATIENT)
Dept: SLEEP MEDICINE | Facility: CLINIC | Age: 52
End: 2022-07-12

## 2022-07-12 NOTE — PROGRESS NOTES
30 DAY STM VISIT    Diagnostic AHI: 20.8  PSG    Subjective measures:   Patient reports doing well with CPAP. He had some concerns about his residual AHI which were assured to be within normal limits at this time. He has concerns about using the sleep 8 ozone cleaning machine and we discussed some information from the sleep 8 website and that the device is okay to use so long as it is not connected to his machine. We then discussed different mask types and mask replacement schedules.    Assessment: Pt not meeting objective benchmarks for AHI Patient meeting subjective benchmarks.   Action plan: pt to have 6 month [facility] visit  Patient has scheduled a follow up visit with Dr. Valdez on 8/19/2022.   Device type: Auto-CPAP  PAP settings: CPAP min 5.0 cm  H20     CPAP max 20.0 cm  H20    95th% pressure 11.4 cm  H20      RESMED EPR level Setting: TWO    RESMED Soft response setting:  OFF  Mask type:  Full Face Mask  Objective measures: 14 day rolling measures      Compliance  92 %      Leak  21.36 lpm  last  upload      AHI 13.77   last  upload      Average number of minutes 386      Objective measure goal  Compliance   Goal >70%  Leak   Goal < 24 lpm  AHI  Goal < 5  Usage  Goal >240        Total time spent on accessing and interpreting remote patient PAP therapy data  10 minutes    Total time spent counseling, coaching  and reviewing PAP therapy data with patient  13 minutes     [id number] this call  [id number] no  at 3 or 14 day [facility]

## 2022-07-15 NOTE — PROGRESS NOTES
is coming into the clinic on 07/18/22 @ 3 :00 for help with her questionnaire and consent.   [facility]  [address] [place] [address] [address]  [place] [address]                                                                                Phone: [phone]                                                                                  Fax: [phone]                                                                    [email or web address]    ANNUAL UPDATE: Diagnostic Assessment Interview    Date of Service: 9/06/17  Client Name: Javi Salmeron  YOB: 1970  46 year old  MRN:  [id number]  Gender/Gender Identity: Male/Man  Treating Provider: Connie Baugh, PhD  Program: CSB  Type of Session: Assessment  Present in Session: patient  Number of Minutes:  55     Updated Presenting Problem and Goals:  Patient has long history of compulsive sexual behavior.  Has this mostly under control.  Uses maintenance plan well.  Stressors continue to trigger some minor boundary violations.  Has improved intimacy functioning.  Has a good intimate relationship with woman he is considering marrying.  He is having struggles with his oldest son who has ADHD and this triggers his negative core fuels but he is managing these reasonably well and in therapy with his son.    His present goals are to work on maintaining progress, using maintenance plan, learning better how to handle risk situations without going into his negative cycle and avoid compulsive sexual behavior.    Manage his dysthymia and anxiety.    Updated Mental Health History:   Depression and anxiety reasonably controlled.  See PHQ 9.  Continues on Celexa 40  Mg. and Naltrexone with positive effect.    Updated Substance Use:   Has a beer once a month or so.  Denies any other drug use. Cage is negative.    Updated Medical History:   Has had a recent pinched nerve in his neck that has been very painful.  Has been treating it with muscle relaxers, cortisone and chiropractic therapy.  Has been under supervision by his primary care  physician.  Wishes that he could have an MRI to see if there is something more going on.    Otherwise, he has a history of cerebral palsy but no other medical concerns.      Updated Family History:   Mr Salmeron is  with several children.  He has a good relationship with his wife and they manage parenting reasonably well.  He is struggling with his oldest son who has ADHD and may be acting out some anger related to the divorce.  They are in therapy together and the son is receiving therapy on his own.    He maintains a good relationship with his mother and sister. His father is .    Updated Current Significant Relationship/Partner:  He has a new relationship.  They have been dating for about a year now.  She has kids and is  as well.  They have introduced the families and things have gone well.  She is anxious to get  and Javi feels a need to settle things with his son first.  But he is pretty committed to her and loves her very much.    They have a healthy sexual relationship.    He is not involved with any extra-relationship relationships.    Updated Educational History:  Nothing new.    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  He has been anxious about the security of his position as a result.  However, he feels that things have been going well recently and he feels much less stress.  He is happy in his position and doing well.    Updated Legal Issues:  None.  ________________________________________________________________  CONCLUSIONS    Updated Strengths and Liabilities:   aJvi is bright and very committed to his recovery.  He has a good maintenance plan and support system.  He uses it well.  He has a supportive ex-wife and a good new relationship.    He continues to struggle with self esteem issues and impression management.    Updated Symptoms:  CSB is relatively under control.  See updated PHQ-9, RAGINI-7, CAGE, and Safety  "Screening    Updated Mental Status:      Mental Status:   Appearance:  Distressed, Well groomed and Appears stated age  Behavior/relationship to examiner/demeanor:  Cooperative, Engaged and Pleasant  Orientation: Oriented to person, place, time and situation  Speech Rate:  Normal  Speech Spontaneity:  Normal  Mood:  \"good\", \"fine\", \"okay\", calm, friendly and comfortable  Affect:  Appropriate/mood-congruent  Thought Process (Associations):  Logical, Linear and Goal directed  Thought Content:  no evidence of suicidal or homicidal ideation  Abnormal Perception:  None  Attention/Concentration:  Normal  Language:  Intact  Insight:  Good  Judgment:  Good        Updated DSM-5 Diagnoses:    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 Based on the client's current report of symptoms, client continues to meet criteria for hypersexual disorder and dysthymia. The client's mental health concerns continue to affect client's ability to function interpersonally and have been causing clinically significant distress. The client reports minimal alcohol use (1 beer - 1 time a month).  The patient is also struggling with problems with his son with ADHD.  Client denies safety concerns. Based on the client's reported symptoms and impact on functioning, the plan for the patient is:  1. Continue supportive individual with a provider specialized in compulsive sexual behavior.  Therapy will focus on reinforcing maintenance plan, coping strategies, improving self esteem.    2. Continue care for medication management.   3. Consider ways of increasing client's social support through using support system.  4. Continue to assess the impact of client's family and relational patterns on their current well-being.   5. Consider " participation in aftercare in order to provide further support.  6. Coordinate care as needed with medical providers.          Connie Baugh PhD, Lp

## 2022-08-01 ENCOUNTER — TELEPHONE (OUTPATIENT)
Dept: SLEEP MEDICINE | Facility: CLINIC | Age: 52
End: 2022-08-01

## 2022-08-01 NOTE — TELEPHONE ENCOUNTER
Pt was called to reschedule appt on 8/19 with Dr Valdez. Found appt on 8/16 and took it for the pt. Asked them to call back if that does not work for them

## 2022-08-16 ENCOUNTER — VIRTUAL VISIT (OUTPATIENT)
Dept: SLEEP MEDICINE | Facility: CLINIC | Age: 52
End: 2022-08-16
Payer: COMMERCIAL

## 2022-08-16 VITALS — WEIGHT: 200 LBS | BODY MASS INDEX: 27.09 KG/M2 | HEIGHT: 72 IN

## 2022-08-16 DIAGNOSIS — G47.33 OBSTRUCTIVE SLEEP APNEA: Primary | ICD-10-CM

## 2022-08-16 PROCEDURE — 99213 OFFICE O/P EST LOW 20 MIN: CPT | Mod: GT | Performed by: INTERNAL MEDICINE

## 2022-08-16 ASSESSMENT — SLEEP AND FATIGUE QUESTIONNAIRES
HOW LIKELY ARE YOU TO NOD OFF OR FALL ASLEEP WHILE SITTING AND TALKING TO SOMEONE: WOULD NEVER DOZE
HOW LIKELY ARE YOU TO NOD OFF OR FALL ASLEEP IN A CAR, WHILE STOPPED FOR A FEW MINUTES IN TRAFFIC: WOULD NEVER DOZE
HOW LIKELY ARE YOU TO NOD OFF OR FALL ASLEEP WHILE SITTING QUIETLY AFTER LUNCH WITHOUT ALCOHOL: SLIGHT CHANCE OF DOZING
HOW LIKELY ARE YOU TO NOD OFF OR FALL ASLEEP WHILE LYING DOWN TO REST IN THE AFTERNOON WHEN CIRCUMSTANCES PERMIT: MODERATE CHANCE OF DOZING
HOW LIKELY ARE YOU TO NOD OFF OR FALL ASLEEP WHILE WATCHING TV: WOULD NEVER DOZE
HOW LIKELY ARE YOU TO NOD OFF OR FALL ASLEEP WHILE SITTING AND READING: SLIGHT CHANCE OF DOZING
HOW LIKELY ARE YOU TO NOD OFF OR FALL ASLEEP WHILE SITTING INACTIVE IN A PUBLIC PLACE: SLIGHT CHANCE OF DOZING
HOW LIKELY ARE YOU TO NOD OFF OR FALL ASLEEP WHEN YOU ARE A PASSENGER IN A CAR FOR AN HOUR WITHOUT A BREAK: WOULD NEVER DOZE

## 2022-08-16 ASSESSMENT — PAIN SCALES - GENERAL: PAINLEVEL: NO PAIN (0)

## 2022-08-16 NOTE — PROGRESS NOTES
Javi is a 51 year old who is being evaluated via a billable video visit.      How would you like to obtain your AVS? MyChart  If the video visit is dropped, the invitation should be resent by: Text to cell phone: [phone]  Will anyone else be joining your video visit? No    WINDY Benavides    Video-Visit Details    Video Start Time: 9:26 AM    Type of service:  Video Visit    Video End Time:9:34 AM    Originating Location (pt. Location): Home    Distant Location (provider location):  [facility] [place]     Platform used for Video Visit: PaxVax     Additional 15 minutes on the date of service was spent performing the following:    -Preparing to see the patient  -Ordering medications, tests, or procedures   -Documenting clinical information in the electronic or other health record     Thank you for the opportunity to participate in the care of Javi Salmeron.     He is a 51 year old y/o male patient who comes to the sleep medicine clinic for follow up.  The patient was diagnosed with JACK 05/11/2022(AHI=20.8). The patient states that he has he is getting benefit from CPAP therapy.     Assessment and Plan:  In summary Javi Salmeron is a 51 year old year old male who is here for follow up.    1. Obstructive sleep apnea  I encouraged the patient to try to increase his hours of usage. I will narrow his pressure of 10 cwp.  - COMPREHENSIVE DME     Compliance Download data for 30 Days:  Pressure setting: APAP 5-20 cwp  95% pressure:9.7 cwp  Residual AHI:7.1 events per hour  Leak:Minimal  Compliance:47%.  Mask Tolerance:Good  Skin irritation:None  DME:[facility].    Lab reviewed: Discussed with patient.    Sleep-Wake Cycle:    The patient likes to initiate sleep at around 11-11:30 PM with a sleep latency of 30 minutes. The patient has 2 nocturnal awakenings. Final wake up time is around 7:30 AM.    IRENE:  IRENE Total Score: 9  Total score - Ronald: 5 (8/16/2022  9:11 AM)        Patient Active  "Problem List   Diagnosis     Dysthymic disorder     Aftercare     Other cerebral palsy (H)     Cervical radiculopathy     Family history of diabetes mellitus     Other Specified Disruptive, Impulse Control or Conduct Disorder (Hypersexual Disorder)      Major depression in complete remission (H)       Past Medical History:   Diagnosis Date     Depressive disorder      Esotropia      Hepatitis      Hypertension      Myopia of both eyes     High myopia     Other cerebral palsy (H)      RA (rheumatoid arthritis) (H)        Past Surgical History:   Procedure Laterality Date     EYE SURGERY Bilateral 1/2014    For \"lazy eye\" - primarily left esotropia but both eyes needed surgery     EYE SURGERY Bilateral age 12    for lazy eyes     LASIK BILATERAL Bilateral 4/2014    Dr. Martinez       Current Outpatient Medications   Medication Sig Dispense Refill     amLODIPine (NORVASC) 10 MG tablet TAKE 1 TABLET(10 MG) BY MOUTH DAILY 90 tablet 1     atorvastatin (LIPITOR) 10 MG tablet Take 1 tablet (10 mg) by mouth daily 90 tablet 3     citalopram (CELEXA) 40 MG tablet Take 1 tablet (40 mg) by mouth every morning 90 tablet 3     hydrochlorothiazide (HYDRODIURIL) 25 MG tablet Take 1 tablet (25 mg) by mouth daily 90 tablet 3     magnesium oxide (MAG-OX) 400 (241.3 Mg) MG tablet Take 1 tablet (400 mg) by mouth At Bedtime 90 tablet 3     terbinafine (LAMISIL) 250 MG tablet Take 1 tablet (250 mg) by mouth daily 90 tablet 0     naltrexone (DEPADE/REVIA) 50 MG tablet Take 1 tablet (50 mg) by mouth daily (Patient not taking: Reported on 8/16/2022) 90 tablet 3       Allergies   Allergen Reactions     Dust Mites      Grass      Ragweeds      Codeine Anxiety     \"gets loopy\" - doesn't like how it feels       Physical Exam:  GEN: NAD,  Psych: normal mood, normal affect    I reviewed the efficacy and compliance report from his device. Data summarized on the HPI and the PAP compliance flow sheet.     Patient verbalized understanding of these " issues, agrees with the plan and all questions were answered today. Patient was given an opportuntity to voice any other symptoms or concerns not listed above. Patient did not have any other symptoms or concerns.      Mika Valdez DO  Board Certified in Internal Medicine and Sleep Medicine    (Note created with Dragon voice recognition and unintended spelling errors and word substitutions may occur)     Audio and visual devices were used for this virtual clinic visit with permission from patient.

## 2022-08-23 ENCOUNTER — TELEPHONE (OUTPATIENT)
Dept: SLEEP MEDICINE | Facility: CLINIC | Age: 52
End: 2022-08-23

## 2022-10-10 ENCOUNTER — OFFICE VISIT (OUTPATIENT)
Dept: PSYCHOLOGY | Facility: CLINIC | Age: 52
End: 2022-10-10
Payer: COMMERCIAL

## 2022-10-10 DIAGNOSIS — F91.8 CONDUCT DISORDER, UNDIFFERENTIATED TYPE: ICD-10-CM

## 2022-10-10 DIAGNOSIS — F34.1 DYSTHYMIC DISORDER: Primary | ICD-10-CM

## 2022-10-10 PROCEDURE — 90837 PSYTX W PT 60 MINUTES: CPT | Mod: 59 | Performed by: PSYCHOLOGIST

## 2022-10-10 PROCEDURE — 90853 GROUP PSYCHOTHERAPY: CPT

## 2022-10-10 NOTE — PROGRESS NOTES
[facility]  Case Progress Note     10/10/22       Client Name: Javi Salmeron  YOB: 1970  MRN:  [id number]   Treating Provider: Connie Baugh PhD  Type of Session: Individual  Number of Minutes: 55     IN person        Current Symptoms/Status:  Depression reasonably well managed.  Having some boundary issues.     Progress Toward Treatment Goals:   Using support system.     Taking citalopram 50 mg.; naltrexone 50 mg.  Sees Dr. Becker       Intervention: Modality and Description:  Individual therapy.  Interpersonal.  Supportive..     MENTAL STATUS EXAM  Appearance: appropriate  Attitude: cooperative  Behavior: normal  Eyre Contact: normal  Speech: normal  Orientation: oreinted to person , place, time and situation  Mood:  admits sadness and anxiety    Affect: Mood Congruient  Thought Process: clear  Suicidal Ideation: none.  Hallucination: no     Response to Intervention:  Processed my transition.  Has felt very grateful for the help he has received.     Plans to continue in aftercare.    Talked about dealing with his relationship and encouraged him to ask for what he wanted.  Stand up for himself - recognizing the process she is going through - ezequiel as this is the 2nd anniversary of her divorce and 2 years together.    Felt good about the feedback he received.         Assignment:  Stay within boundaries.      .     Diagnosis:  300.4 Dysthymia  312.89 (F91.8) Other Specified Disruptive, Impulse Control, and Conduct Disorder (Hypersexual Disorder)         Plan / Need for Future Services:  Continue individual therapy.  See in one month.  Monthly aftercare support group.      TREATMENT PLAN  Date of Treatment Plan 22  Name: Javi Salmeron  : 1970  Medical Record Number: [id number]  Treating Provider: Connie Baugh PhD  Type of Session: Individual  Present in Session: Patient.     Current Status:  Depression/Mood:  Reports no problems or symptoms at this  time     Anxiety/Panic:  Reports no problems or symptoms at this time     Thought:   Reports no problems or symptoms at this time     Sensorium:  Reports no problems or symptoms at this time     Behavior/Health:  Reports no problems or symptoms at this time     Chemical Use:  Denies both Alcohol and Drug Abuse     Sexual Problems:  Hx of compulsive sexual behavior.     Suicide Risk Assessment:  Assessed Level of Immediate Risk:  YES  Ideation:  NO  Plan:  NO  Means:  NO  Intent:  NO     Homicide Risk Assessment:  Assessed Level of Immediate Risk:  YES  Ideation:  NO  Plan:  NO  Means:  NO  Intent:  NO     Impact of Symptoms on Function:  No Current Impact of Symptoms on Function     DSM-5 Diagnoses:          Dysthymia    Screening Questionnaires:  Please indicate whether the following screening questionnaires have been completed:  CAGE: No  PHQ-9: No    RAGINI-7: No  Safety Screening: No  WHODAS: No     Problem(s):  1. Dysthymia  2. Hx of compulsive sexual behavior.        Short-Long Term Goals  Increase Coping  Monitor Psych Status  Enhance Relationships     Interventions  [place] [facility]  Cognitive-Behavioral Therapy  Aftercare support group     Expected Outcomes and Prognosis  Expected improvement     Anticipated Treatment Duration:  Unknown     Frequency of Sessions  1X 4-6 months  Monthly aftercare support grouip     Progress Update (for Plan Update Only)  Compliant, Progressing and Improving - Needs More Sessions     Current Psychoactive Medications:  See Psychiatric Treatment Plan  Discharge & Aftercare Goals: To Be Determined.  Care Coordination: No     Consent to Treatment:   Patient participated in this treatment planning process and indicated verbal agreement with the above treatment plan.  If patient doesn't sign, indicate why: Telehealth visit due to COVID19 pandemic     Patient Signature: Javi Salmeron  Date: 6-6-22     Provider Signature: Connie Baugh, PhD  Date: 6-22-22

## 2022-10-30 NOTE — PROGRESS NOTES
[facility]  Case Progress Note    10/10/22    Client Name: Javi Salmeron  YOB: 1970  MRN:  [id number]   Treating Provider: Connie Baugh, PhD  Type of Session: Group  Number of Minutes: 120    In person     Current Symptoms/Status:  Depression reasonably well managed.  Not having issues with CSB.       Progress Toward Treatment Goals:   Using support system.       Intervention: Modality and Description:  Group therapy.  Interpersonal.  Supportive..    MENTAL STATUS EXAM  Appearance: appropriate  Attitude: cooperative  Behavior: normal  Eyre Contact: normal  Speech: normal  Orientation: oreinted to person , place, time and situation  Mood:  admits sadness and anxiety most of time  Affect: Mood Congruient  Thought Process: clear  Suicidal Ideation: none.  Hallucination: no    Response to Intervention:  This was a process session addressing my last day in group.  Reflected on the positive impact this program has had on him.  Very grateful.  Grieving loss.  Motivated to continue working with group.  Wants to follow me in private practice.  Received positive feedback from group members.     Assignment:  Stay within boundaries.      Read Attached.    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  See in one month.  Monthly aftercare support group.     TREATMENT PLAN  Date of Treatment Plan 22  Name: Javi Salmeron  : 1970  Medical Record Number: [id number]  Treating Provider: Connie Baugh, PhD  Type of Session: Individual  Present in Session: Patient.    Current Status:  Depression/Mood:  Reports no problems or symptoms at this time    Anxiety/Panic:  Reports no problems or symptoms at this time    Thought:   Reports no problems or symptoms at this time    Sensorium:  Reports no problems or symptoms at this  time    Behavior/Health:  Reports no problems or symptoms at this time    Chemical Use:  Denies both Alcohol and Drug Abuse    Sexual Problems:  Hx of compulsive sexual behavior.    Suicide Risk Assessment:  Assessed Level of Immediate Risk:  YES  Ideation:  NO  Plan:  NO  Means:  NO  Intent:  NO    Homicide Risk Assessment:  Assessed Level of Immediate Risk:  YES  Ideation:  NO  Plan:  NO  Means:  NO  Intent:  NO    Impact of Symptoms on Function:  No Current Impact of Symptoms on Function    DSM-5 Diagnoses:   Diagnoses       Codes Comments    Conduct disorder, undifferentiated type    -  Primary F91.8     Dysthymic disorder     F34.1           Screening Questionnaires:  Please indicate whether the following screening questionnaires have been completed:  CAGE: No  PHQ-9: No    RAGINI-7: No  Safety Screening: No  WHODAS: No    Problem(s):  1. Dysthymia  2. Hx of compulsive sexual behavior.      Short-Long Term Goals  Increase Coping  Monitor Psych Status  Enhance Relationships    Interventions  [place] [facility]  Cognitive-Behavioral Therapy  Aftercare support group    Expected Outcomes and Prognosis  Expected improvement    Anticipated Treatment Duration:  Unknown    Frequency of Sessions  1X 4-6 months  Monthly aftercare support grouip    Progress Update (for Plan Update Only)  Compliant, Progressing and Improving - Needs More Sessions    Current Psychoactive Medications:  See Psychiatric Treatment Plan  Discharge & Aftercare Goals: To Be Determined.  Care Coordination: No    Consent to Treatment:   Patient participated in this treatment planning process and indicated verbal agreement with the above treatment plan.  If patient doesn't sign, indicate why: Telehealth visit due to COVID19 pandemic    Patient Signature: Javi Salmeron  Date: 6-6-22    Provider Signature: Connie Baugh, PhD  Date: 6-22-22

## 2022-11-03 ENCOUNTER — OFFICE VISIT (OUTPATIENT)
Dept: PSYCHOLOGY | Facility: CLINIC | Age: 52
End: 2022-11-03
Payer: COMMERCIAL

## 2022-11-03 DIAGNOSIS — F91.8 CONDUCT DISORDER, UNDIFFERENTIATED TYPE: ICD-10-CM

## 2022-11-03 DIAGNOSIS — F34.1 DYSTHYMIC DISORDER: Primary | ICD-10-CM

## 2022-11-03 PROCEDURE — 90853 GROUP PSYCHOTHERAPY: CPT

## 2022-11-03 NOTE — PROGRESS NOTES
[facility]  [facility]  [address], [address]  [place]  [address]    Group Progress Note  [facility] - Progress Note    Date of Service: 22   Name: Javi Salmeron  : 1970  Medical Record Number: [id number]  Therapist(s) in Group: Doe Avendano, LMFT, MEET   Type of Session: Group  :  Number of Attendees:  7  Session Start and Stop Time: 430pm-630pm  Number of Minutes:  /120    SERVICE MODALITY:  In-person    DSM-5 Diagnoses:  300.4 Dysthymia  312.89 (F91.8) Other Specified Disruptive, Impulse Control, and Conduct Disorder (Hypersexual Disorder)       Current Reported Symptoms and Status update:  Changes since last session- had a birthday, csb managed, maintaining boundaries     Progress Toward Treatment Goals:   Stable/Maintenance of progress     Therapeutic Interventions/Treatment Strategies:    Area(s) of treatment focus addressed in this session included [place] Maintenance/Relapse Prevention    Patient checked in about their mental health symptoms, healthy coping skills used over the week, negative coping skills used over the week, what sexual behaviors they engaged in-fantasy, masturbation, sex, their comfort level with their behaviors, if there were triggers, urges to violate boundaries, and violations of boundaries.  They took time to process about their birthday and relationship with children they provided support and feedback to others in the group.    Psychotherapist offered support, feedback and validation and reinforced use of skills Treatment modalities used include Group Dynamic Therapy  Relationship Skills: Encouraged development and maintenance  of healthy boundaries  Support, Feedback and Structured Activity    Patient Response:   Patient responded to session by accepting feedback, giving feedback and accepting support  Possible barriers to participation / learning include: N/A    Current Mental Status Exam:    Appearance:  Appropriate   Eye Contact:  Good   Attitude / Demeanor: Cooperative   Speech      Rate / Production: Normal/ Responsive      Volume:  Normal  volume  Orientation:  All  Mood:   Euthymic  Affect:   Appropriate   Thought Content: Clear   Insight:   Good       Plan/Need for Future Services:  Return for therapy in 4 weeks to treat diagnosed problems.    Patient has a current master individualized treatment plan.  See Epic treatment plan for more information.    Referral / Collaboration:  Referral to another professional/service is not indicated at this time..    No    Assignment:  Return in one month    Interactive Complexity:  There are four specific communication difficulties that complicate the work of the primary psychiatric procedure.  Interactive complexity (+[id number]) may be reported when at least one of these difficulties is present.    Communication difficulties present during current the psychiatric procedure include:  1. None.      Signature/Title:    LARS Zuleta, [facility]

## 2022-11-26 DIAGNOSIS — F42.9 OBSESSIVE-COMPULSIVE DISORDER, UNSPECIFIED TYPE: ICD-10-CM

## 2022-11-29 RX ORDER — NALTREXONE HYDROCHLORIDE 50 MG/1
TABLET, FILM COATED ORAL
Qty: 90 TABLET | Refills: 0 | Status: SHIPPED | OUTPATIENT
Start: 2022-11-29 | End: 2023-02-06

## 2022-11-29 NOTE — CONFIDENTIAL NOTE
Naltrexone (Depade/Revia) 50 mg    Last Office Visit: 6/17/22  Future [facility] Appointments: None  Medication last refilled: 11/15/21 #90 with 3 refill(s)    Prescription approved per [facility] Refill Protocol.    OFELIA LopezN, RN, CCM

## 2023-01-05 ENCOUNTER — OFFICE VISIT (OUTPATIENT)
Dept: PSYCHOLOGY | Facility: CLINIC | Age: 53
End: 2023-01-05
Payer: COMMERCIAL

## 2023-01-05 DIAGNOSIS — F91.8 CONDUCT DISORDER, UNDIFFERENTIATED TYPE: ICD-10-CM

## 2023-01-05 DIAGNOSIS — F34.1 DYSTHYMIC DISORDER: Primary | ICD-10-CM

## 2023-01-05 PROCEDURE — 90853 GROUP PSYCHOTHERAPY: CPT

## 2023-01-05 NOTE — GROUP NOTE
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.8) Other Specified Disruptive, Impulse Control, and Conduct Disorder (Hypersexual Disorder)       Current Reported Symptoms and Status update:  Changes since last session -increase in anxiety and depressive symptoms    Progress Toward Treatment Goals:   Stable/Maintenance of progress     Therapeutic Interventions/Treatment Strategies:    Area(s) of treatment focus addressed in this session included Symptom Management and [place] Maintenance/Relapse Prevention    Patient checked in about their mental health symptoms, healthy coping skills used over the week, negative coping skills used over the week, what sexual behaviors they engaged in-fantasy, masturbation, sex, their comfort level with their behaviors, if there were triggers, urges to violate boundaries, and violations of boundaries.  They took time to process about their past month and decreased mood they provided support and feedback to others in the group.    Psychotherapist offered support, feedback and validation and reinforced use of skills Treatment modalities used include [facility] THERAPY INTERVENTIONS: Group Dynamic Therapy  Coping Skills: Facilitated understanding of  what factors may contribute to symptom relapse and skills plan to manage symptom relapse   Support, Feedback and Structured Activity    Patient Response:   Patient responded to session by accepting feedback, giving feedback and listening  Possible barriers to  participation / learning include: N/A    Current Mental Status Exam:   Appearance:  Appropriate   Eye Contact:  Good   Attitude / Demeanor: Cooperative   Speech      Rate / Production: Normal/ Responsive      Volume:  Normal  volume  Orientation:  All  Mood:   Anxious   Affect:   Appropriate   Thought Content: Clear   Insight:   Good       Plan/Need for Future Services:  Return for therapy in 4 weeks to treat diagnosed problems.    Patient has a current master individualized treatment plan.  See Epic treatment plan for more information.    Referral / Collaboration:  Referral to another professional/service is not indicated at this time..  Emergency Services Needed?  No    Assignment:  Attend individual therapy  Return in 4 weeks    Interactive Complexity:  There are four specific communication difficulties that complicate the work of the primary psychiatric procedure.  Interactive complexity (+[id number]) may be reported when at least one of these difficulties is present.    Communication difficulties present during current the psychiatric procedure include:  1. None.      Signature/Title:    Doe Avendano, LMFT, [facility]

## 2023-01-08 ENCOUNTER — HEALTH MAINTENANCE LETTER (OUTPATIENT)
Age: 53
End: 2023-01-08

## 2023-01-11 ENCOUNTER — VIRTUAL VISIT (OUTPATIENT)
Dept: PSYCHOLOGY | Facility: CLINIC | Age: 53
End: 2023-01-11
Payer: COMMERCIAL

## 2023-01-11 DIAGNOSIS — F91.8 CONDUCT DISORDER, UNDIFFERENTIATED TYPE: ICD-10-CM

## 2023-01-11 DIAGNOSIS — F34.1 DYSTHYMIC DISORDER: Primary | ICD-10-CM

## 2023-01-11 PROCEDURE — 90837 PSYTX W PT 60 MINUTES: CPT | Mod: GT | Performed by: MARRIAGE & FAMILY THERAPIST

## 2023-01-11 NOTE — PROGRESS NOTES
"[facility] - Progress Note    Date of Service: 23   Name: Javi Salmeron  : 1970  Medical Record Number: [id number]  Treating Provider: LARS Goncalves LADC  Type of Session: Individual  Present in Session: pt  Session Start and Stop Time: 8356-2944  Number of Minutes:  58    SERVICE MODALITY:  Video Visit:      Provider verified identity through the following two step process.  Patient provided:  Patient is known previously to provider    Telemedicine Visit: The patient's condition can be safely assessed and treated via synchronous audio and visual telemedicine encounter.      Reason for Telemedicine Visit: Services only offered telehealth    Originating Site (Patient Location): Patient's home    Distant Site (Provider Location): [facility]    Consent:  The patient/guardian has verbally consented to: the potential risks and benefits of telemedicine (video visit) versus in person care; bill my insurance or make self-payment for services provided; and responsibility for payment of non-covered services.     Patient would like the video invitation sent by:  My Chart    Mode of Communication:  Video Conference via [facility]    Distant Location (Provider):  Off-site    As the provider I attest to compliance with applicable laws and regulations related to telemedicine.    DSM-5 Diagnoses:  300.4 Dysthymia  312.89 (F91.8) Other Specified Disruptive, Impulse Control, and Conduct Disorder (Hypersexual Disorder)       Current Reported Symptoms and Status update:  Changes since last session  Depression reasonably well managed. Not having issues with CSB.       Progress Toward Treatment Goals:   Stable/Maintenance of progress     Therapeutic Interventions/Treatment Strategies:    Area(s) of treatment focus addressed in this session included Symptom Management and Interpersonal Relationship Skills    Triggers \"I am deffective\" \"I dont belong\" \"I am " "not worthy of love\"   Dad who raised him completed suicide  Bio-dad falling out after January 6th      Psychotherapist offered support, feedback and validation and reinforced use of skills Treatment modalities used include Motivational Interviewing Interpersonal Coping Skills: Discussed use of self-soothe skills to decrease distress in the body and Facilitated understanding of  what factors may contribute to symptom relapse and skills plan to manage symptom relapse   Support, Feedback and Clarification    Patient Response:   Patient responded to session by accepting feedback, listening and accepting support  Possible barriers to participation / learning include: N/A    Current Mental Status Exam:   Appearance:  Appropriate   Eye Contact:  Good   Attitude / Demeanor: Cooperative   Speech      Rate / Production: Normal/ Responsive      Volume:  Normal  volume  Orientation:  All  Mood:   Anxious   Affect:   Appropriate   Thought Content: Clear   Insight:   Good       Plan/Need for Future Services:  Return for therapy in 3 weeks to treat diagnosed problems.    Patient has a current master individualized treatment plan.  See Epic treatment plan for more information.    Referral / Collaboration:  Referral to another professional/service is not indicated at this time..  Emergency Services Needed?  No    Assignment:  Stretching     Interactive Complexity:  There are four specific communication difficulties that complicate the work of the primary psychiatric procedure.  Interactive complexity (+[id number]) may be reported when at least one of these difficulties is present.    Communication difficulties present during current the psychiatric procedure include:  1. None.      Signature/Title:    Rosalinda Avendano, LARS, [facility]         "

## 2023-01-19 DIAGNOSIS — I10 ESSENTIAL HYPERTENSION, BENIGN: ICD-10-CM

## 2023-01-20 RX ORDER — MAGNESIUM OXIDE TAB 400 MG (241.3 MG ELEMENTAL MG) 400 (241.3 MG) MG
TAB ORAL
Qty: 90 TABLET | Refills: 0 | Status: SHIPPED | OUTPATIENT
Start: 2023-01-20 | End: 2023-06-19

## 2023-01-20 NOTE — TELEPHONE ENCOUNTER
Medication requested: magnesium oxide (MAG-OX) 400 (241.3 Mg) MG tablet  Last office visit: 6/17/22  [facility] appointments: none  Medication last refilled: 11/15/21; 90 + 3 refills  Last qualifying labs:   BP Readings from Last 3 Encounters:   06/17/22 123/73   11/15/21 (!) 141/87   01/18/21 122/75     Prescription approved per [facility] Refill Protocol.    Lauro PIERCE, RN  01/20/23 9:53 AM

## 2023-02-02 ENCOUNTER — OFFICE VISIT (OUTPATIENT)
Dept: PSYCHOLOGY | Facility: CLINIC | Age: 53
End: 2023-02-02
Payer: COMMERCIAL

## 2023-02-02 DIAGNOSIS — F34.1 DYSTHYMIC DISORDER: Primary | ICD-10-CM

## 2023-02-02 DIAGNOSIS — F91.8 CONDUCT DISORDER, UNDIFFERENTIATED TYPE: ICD-10-CM

## 2023-02-02 PROCEDURE — 90853 GROUP PSYCHOTHERAPY: CPT

## 2023-02-02 NOTE — GROUP NOTE
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 Not having issues with CSB.       Progress Toward Treatment Goals:   Stable/Maintenance of progress     Therapeutic Interventions/Treatment Strategies:    Area(s) of treatment focus addressed in this session included Symptom Management, Kemper Maintenance/Relapse Prevention and Maintenance of Progress    Patient checked in about their mental health symptoms, healthy coping skills used over the week, negative coping skills used over the week, what sexual behaviors they engaged in-fantasy, masturbation, sex, their comfort level with their behaviors, if there were triggers, urges to violate boundaries, and violations of boundaries.  They took time to process about \"their downward spiral\" they provided support and feedback to others in the group.    Psychotherapist offered support, feedback and validation and reinforced use of skills Treatment modalities used include [facility] THERAPY INTERVENTIONS: Motivational Interviewing Group Dynamic Therapy  Interpersonal discussed application of self compassion and explored challenging unrealistic expectations of self/others  Support, Feedback and Structured Activity    Patient Response:   Patient " responded to session by accepting feedback, giving feedback and listening  Possible barriers to participation / learning include: N/A    Current Mental Status Exam:   Appearance:  Appropriate   Eye Contact:  Good   Attitude / Demeanor: Cooperative   Speech      Rate / Production: Normal/ Responsive Emotional      Volume:  Normal  volume  Orientation:  All  Mood:   Anxious  Depressed   Affect:   Appropriate   Thought Content: Clear   Insight:   Good       Plan/Need for Future Services:  Return for therapy in 4 weeks to treat diagnosed problems.    Patient has a current master individualized treatment plan.  See Epic treatment plan for more information.    Referral / Collaboration:  Referral to another professional/service is not indicated at this time..  Emergency Services Needed?  No    Assignment:  Return in 4 weeks    Interactive Complexity:  There are four specific communication difficulties that complicate the work of the primary psychiatric procedure.  Interactive complexity (+27847) may be reported when at least one of these difficulties is present.    Communication difficulties present during current the psychiatric procedure include:  1. None.      Signature/Title:    Doe Avendano, LMFT, [facility]

## 2023-02-05 DIAGNOSIS — F42.9 OBSESSIVE-COMPULSIVE DISORDER, UNSPECIFIED TYPE: ICD-10-CM

## 2023-02-05 DIAGNOSIS — E78.5 HYPERLIPIDEMIA LDL GOAL <100: ICD-10-CM

## 2023-02-05 DIAGNOSIS — I10 ESSENTIAL HYPERTENSION, BENIGN: ICD-10-CM

## 2023-02-05 DIAGNOSIS — F32.A DEPRESSION, UNSPECIFIED DEPRESSION TYPE: ICD-10-CM

## 2023-02-06 DIAGNOSIS — E78.5 HYPERLIPIDEMIA LDL GOAL <100: ICD-10-CM

## 2023-02-06 RX ORDER — CITALOPRAM HYDROBROMIDE 40 MG/1
TABLET ORAL
Qty: 30 TABLET | Refills: 0 | Status: SHIPPED | OUTPATIENT
Start: 2023-02-06 | End: 2023-03-07

## 2023-02-06 RX ORDER — ATORVASTATIN CALCIUM 10 MG/1
TABLET, FILM COATED ORAL
Qty: 30 TABLET | Refills: 0 | Status: SHIPPED | OUTPATIENT
Start: 2023-02-06 | End: 2023-03-07

## 2023-02-06 RX ORDER — NALTREXONE HYDROCHLORIDE 50 MG/1
TABLET, FILM COATED ORAL
Qty: 30 TABLET | Refills: 0 | Status: SHIPPED | OUTPATIENT
Start: 2023-02-06 | End: 2023-03-07

## 2023-02-06 RX ORDER — HYDROCHLOROTHIAZIDE 25 MG/1
TABLET ORAL
Qty: 30 TABLET | Refills: 0 | Status: SHIPPED | OUTPATIENT
Start: 2023-02-06 | End: 2023-03-13

## 2023-02-06 RX ORDER — ATORVASTATIN CALCIUM 10 MG/1
TABLET, FILM COATED ORAL
Qty: 90 TABLET | OUTPATIENT
Start: 2023-02-06

## 2023-02-06 NOTE — TELEPHONE ENCOUNTER
Medication requested: naltrexone (DEPADE/REVIA) 50 MG tablet  Last office visit: 6/17/22  [facility] appointments: none  Medication last refilled: 11/29/22; 90 + 0 refills  Last qualifying labs: N/A    Routing refill request to provider for review/approval because:  Drug not on the G refill protocol     Medication requested: hydrochlorothiazide (HYDRODIURIL) 25 MG tablet  Medication last refilled: 1/20/22; 90 + 3 refills  Last qualifying labs:   Component      Latest Ref Rng & Units 11/15/2021   Sodium      133 - 144 mmol/L 137   Potassium      3.4 - 5.3 mmol/L 3.3 (L)     Component      Latest Ref Rng & Units 11/15/2021   Creatinine      0.66 - 1.25 mg/dL 1.06     Medication is being filled for 1 time refill only due to:  Future labs ordered CMP.    Medication requested: atorvastatin (LIPITOR) 10 MG tablet  Medication last refilled: 11/16/21; 90 + 3 refills  Last qualifying labs:   Component      Latest Ref Rng & Units 11/15/2021   Cholesterol      <200 mg/dL 239 (H)   Triglycerides      <150 mg/dL 158 (H)   HDL Cholesterol      >=40 mg/dL 42   LDL Cholesterol Calculated      <=100 mg/dL 165 (H)   Non HDL Cholesterol      <130 mg/dL 197 (H)   Patient Fasting > 8hrs?       No     Medication is being filled for 1 time refill only due to:  Patient needs labs lipid panel.    Medication requested: citalopram (CELEXA) 40 MG tablet  Medication last refilled: 11/15/21; 90 + 3 refills  Last qualifying labs:   PHQ-9 / RAGINI-7 Scores 8/2015 to present 11/17/2021   RAGINI-7 Score DocFlow 0   PHQ-9 Score DocFlow 1     Medication is being filled for 1 time refill only due to:  Patient needs to be seen because due for mental health screening PHQ9/GAD7.    Message sent to pt to schedule appointment with Dr. Becker.    Lauro Soto - KARI, RN  02/06/23 11:23 AM

## 2023-02-08 ENCOUNTER — VIRTUAL VISIT (OUTPATIENT)
Dept: PSYCHOLOGY | Facility: CLINIC | Age: 53
End: 2023-02-08
Payer: COMMERCIAL

## 2023-02-08 DIAGNOSIS — F91.8 CONDUCT DISORDER, UNDIFFERENTIATED TYPE: ICD-10-CM

## 2023-02-08 DIAGNOSIS — F34.1 DYSTHYMIC DISORDER: Primary | ICD-10-CM

## 2023-02-08 PROCEDURE — 90791 PSYCH DIAGNOSTIC EVALUATION: CPT | Mod: VID | Performed by: MARRIAGE & FAMILY THERAPIST

## 2023-02-08 ASSESSMENT — PATIENT HEALTH QUESTIONNAIRE - PHQ9
SUM OF ALL RESPONSES TO PHQ QUESTIONS 1-9: 3
10. IF YOU CHECKED OFF ANY PROBLEMS, HOW DIFFICULT HAVE THESE PROBLEMS MADE IT FOR YOU TO DO YOUR WORK, TAKE CARE OF THINGS AT HOME, OR GET ALONG WITH OTHER PEOPLE: NOT DIFFICULT AT ALL
SUM OF ALL RESPONSES TO PHQ QUESTIONS 1-9: 3

## 2023-02-08 NOTE — PROGRESS NOTES
[facility]            [address] [facility] [address]  [place] [address]                                                                                Phone: [phone]                                                                                  Fax: [phone]                                                                    [email or web address]    ANNUAL UPDATE: Diagnostic Assessment Interview (updated 4/3/2017)    Date of Service: 2/08/23  Client Name: Javi Monteiro of Birth:  1970  52 year old  MRN:  [id number]  Gender/Gender Identity: male  Treating Provider: LARS Garvey [facility]  Program:  LEONCIO  Type of Session: Assessment  Present in Session: pt  Number of Minutes:  55     Telemedicine Visit: The patient's condition can be safely assessed and treated via synchronous audio and visual telemedicine encounter.      Reason for Telemedicine Visit: Services only offered telehealth    Originating Site (Patient Location): Patient's home        Distant Location (provider location):  Off-site    Consent:  The patient/guardian has verbally consented to: the potential risks and benefits of telemedicine (video visit) versus in person care; bill my insurance or make self-payment for services provided; and responsibility for payment of non-covered services.     Mode of Communication:  Video Conference via [employer]    As the provider I attest to compliance with applicable laws and regulations related to telemedicine.    Updated Presenting Problem and Goals:  Maintenance of progress-still struggling at times with compulsive behavior, still struggling with relationship intimacy     Updated Mental Health History:   Low mood at times, depression well managed     Updated Substance Use:   No  use    Updated Medical History:   Not exercising- some over eating    Updated Contraception Use:  NA    Updated Family History:   Andrew living at home with pt    Updated  "Current Significant Relationship/Partner:  Dating Bonny, conflict-she is distant and unable to say I love you, currently not sure if will stay together    Concurrent/extramarital relationships:  Engaged in negative cycle last month, oral sex with partner found on internet    Updated Educational History:  NA    Updated Occupational History:  Was promoted at work, feeling stressed and overwhelmed    Updated Legal Issues:  na  ________________________________________________________________  CONCLUSIONS    Updated Strengths and Liabilities:   Insight and understanding of positive and negative cycle, continues to struggle with negative core fuels    Updated Symptoms:  Depressive symptoms that are sometimes managed with rx, increase depression symptoms increase when in a negative cycle  See updated PHQ-9, RAGINI-7, CAGE, and Safety Screening    Updated Mental Status:      Mental Status:   Appearance:  Casually dressed  Behavior/relationship to examiner/demeanor:  Cooperative and Engaged  Orientation: Oriented to person, place, time and situation  Speech Rate:  Normal  Speech Spontaneity:  Normal  Mood:  \"fine\" and calm  Affect:  Appropriate/mood-congruent  Thought Process (Associations):  Logical, Linear and Goal directed  Thought Content:  no evidence of suicidal or homicidal ideation and denies suicidal ideation, intent or thoughts  Abnormal Perception:  None  Attention/Concentration:  Normal  Language:  Intact  Insight:  Good  Judgment:  Good          Interactive Complexity:  Communication difficulties present during the current psychiatric procedure included:  1. The need to manage maladaptive communication (e.g. related to high anxiety, high reactivity, repeated questions, or disagreement, etc.) among participants that complicated delivery of care.   2. Caregiver emotion/behavior that interfered with implementation of the treatment plan.  3. Evidence/disclosure of a sentinel event and mandated report to a third party " (e.g. abuse or neglect with report to state agency, etc.) with initiation of discussion of the sentinel event and/or report with patient and other visit participants.   4. Use of play equipment, physical devices,  or  to overcome significant communication barriers.   5. N/A    Updated DSM-5 Diagnoses:  1. Dysthymic disorder    2. Other Specified Disruptive, Impulse Control or Conduct Disorder (Hypersexual Disorder)         Conclusions/Recommendations/Initial Treatment Goals:   The client is a 52 year old Not  or  person assigned male at birth who identifies as male. Based on the client's current report of symptoms, client continues to meet criteria for dysthymic disorder and compulsive sexual behavior. The client's mental health concerns continue to affect client's ability to function in relationships and have been causing clinically significant distress. The client reports no drug/alcohol use frequency and duration. The patient is also struggling with emotional intimacy and negative core beliefs. Client denies [safety concerns]. Based on the client's reported symptoms and impact on functioning, the plan for the patient is:  1. Continue supportive individual/family therapy with a provider specialized in sex therapy. Therapy should focus on interuppting negative cycle and maintaining positive cycle.  2. Continue care/Establish care with a psychiatrist for medication management.   3. Consider ways of increasing client's social support through socializing.  4. Continue to assess the impact of client's family and relational patterns on their current well-being.   5. Consider participation in after care group therapy in order to provide further support.  6. Coordinate care as needed with medical providers.   7. Psychological testing to further assess dx, psychological functioning, and treatment direction.      Signature/Title    LARS Zuleta, [facility] on 2/8/2023 at 8:15  PM        Answers for HPI/ROS submitted by the patient on 2/8/2023  If you checked off any problems, how difficult have these problems made it for you to do your work, take care of things at home, or get along with other people?: Not difficult at all  PHQ9 TOTAL SCORE: 3

## 2023-02-23 ENCOUNTER — VIRTUAL VISIT (OUTPATIENT)
Dept: PSYCHOLOGY | Facility: CLINIC | Age: 53
End: 2023-02-23
Payer: COMMERCIAL

## 2023-02-23 DIAGNOSIS — F91.8 CONDUCT DISORDER, UNDIFFERENTIATED TYPE: ICD-10-CM

## 2023-02-23 DIAGNOSIS — F34.1 DYSTHYMIC DISORDER: Primary | ICD-10-CM

## 2023-02-23 PROCEDURE — 90837 PSYTX W PT 60 MINUTES: CPT | Mod: VID | Performed by: MARRIAGE & FAMILY THERAPIST

## 2023-02-23 NOTE — PROGRESS NOTES
[place] [facility] - Progress Note    Date of Service: 23   Name: Javi Salmeron  : 1970  Medical Record Number: [id number]  Treating Provider: LARS Goncalves LADC  Type of Session: Individual  Present in Session: pt  Session Start and Stop Time: 400pm-458  Number of Minutes:  58    SERVICE MODALITY:  Video Visit:      Provider verified identity through the following two step process.  Patient provided:  Patient is known previously to provider    Telemedicine Visit: The patient's condition can be safely assessed and treated via synchronous audio and visual telemedicine encounter.      Reason for Telemedicine Visit: Services only offered telehealth    Originating Site (Patient Location): Patient's home    Distant Site (Provider Location): [facility]    Consent:  The patient/guardian has verbally consented to: the potential risks and benefits of telemedicine (video visit) versus in person care; bill my insurance or make self-payment for services provided; and responsibility for payment of non-covered services.     Patient would like the video invitation sent by:  My Chart    Mode of Communication:  Video Conference via [facility]    Distant Location (Provider):  Off-site    As the provider I attest to compliance with applicable laws and regulations related to telemedicine.    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 Not having issues with CSB.       Progress Toward Treatment Goals:   Stable/Maintenance of progress     Therapeutic Interventions/Treatment Strategies:    Area(s) of treatment focus addressed in this session included Symptom Management, Interpersonal Relationship Skills and Maintenance of  Progress    Processed about breaking up with Bonny-working and problem solving dating self    Psychotherapist offered support, feedback and validation and reinforced use of skills Treatment modalities used include Attachment based intervention  Interpersonal Relationship Skills: Discussed strategies to promote healthier understanding of interpersonal relationships and discussed application of self compassion and explored challenging unrealistic expectations of self/others  Support, Feedback and Clarification    Patient Response:   Patient responded to session by accepting feedback, listening and accepting support  Possible barriers to participation / learning include: N/A    Current Mental Status Exam:   Appearance:  Appropriate   Eye Contact:  Good   Attitude / Demeanor: Cooperative   Speech      Rate / Production: Normal/ Responsive      Volume:  Normal  volume  Orientation:  All  Mood:   Anxious  Normal  Affect:   Appropriate   Thought Content: Clear   Insight:   Good       Plan/Need for Future Services:  Return for therapy in 3 weeks to treat diagnosed problems.    Patient has a current master individualized treatment plan.  See Epic treatment plan for more information.    Referral / Collaboration:  Referral to another professional/service is not indicated at this time..  Emergency Services Needed?  No    Assignment:  Date self for 90 days one unique thing for self weekly    Interactive Complexity:  There are four specific communication difficulties that complicate the work of the primary psychiatric procedure.  Interactive complexity (+67539) may be reported when at least one of these difficulties is present.    Communication difficulties present during current the psychiatric procedure include:  1. None.      Signature/Title:    Rosalinda Avendano, LARS, [facility]

## 2023-02-23 NOTE — NURSING NOTE
Is the patient currently in the state of [place]? YES    Visit mode:VIDEO    If the visit is dropped, the patient can be reconnected by: VIDEO VISIT: Send to e-mail at: [email or web address]    Will anyone else be joining the visit? NO      How would you like to obtain your AVS? MyChart    Are changes needed to the allergy or medication list? n/a    Reason for visit: n/a

## 2023-03-02 ENCOUNTER — OFFICE VISIT (OUTPATIENT)
Dept: PSYCHOLOGY | Facility: CLINIC | Age: 53
End: 2023-03-02
Payer: COMMERCIAL

## 2023-03-02 DIAGNOSIS — F34.1 DYSTHYMIC DISORDER: Primary | ICD-10-CM

## 2023-03-02 DIAGNOSIS — F91.8 CONDUCT DISORDER, UNDIFFERENTIATED TYPE: ICD-10-CM

## 2023-03-02 PROCEDURE — 90853 GROUP PSYCHOTHERAPY: CPT

## 2023-03-02 NOTE — GROUP NOTE
[facility]  [address], [address]  [place]  [address]    Group Progress Note  [facility] - Progress Note    Date of Service: 3/02/23   Name: Javi Salmeron  : 1970  Medical Record Number: [id number]  START TIME:  4:30 PM  END TIME:  6:30 PM  FACILITATOR(S): Rosalinda Avendano, LMFT, [facility]  TOPIC: [facility] Group Therapy  Type of Session: Group  :  Number of Attendees:  7  Number of Minutes: 120    SERVICE MODALITY:  In-person      DSM-5 Diagnoses:  300.4 Dysthymia  312.89 (F91.8) Other Specified Disruptive, Impulse Control, and Conduct Disorder (Hypersexual Disorder)       Current Reported Symptoms and Status update:  Changes since last session-is single and broke up w gf, is dating self  Depression reasonably well managed. Not having issues with CSB.       Progress Toward Treatment Goals:   Stable/Maintenance of progress     Therapeutic Interventions/Treatment Strategies:    Area(s) of treatment focus addressed in this session included Maintenance of Progress    Patient checked in about their mental health symptoms, healthy coping skills used over the week, negative coping skills used over the week, what sexual behaviors they engaged in-fantasy, masturbation, sex, their comfort level with their behaviors, if there were triggers, urges to violate boundaries, and violations of boundaries.  They took time to process about how they have been working on being ok with themselves they provided support and feedback to others in the group.    Psychotherapist offered support, feedback and validation and reinforced use of skills Treatment modalities used include [facility] THERAPY INTERVENTIONS: Group Dynamic Therapy  Interpersonal Coping Skills: Assisted patient in understanding the purpose of planning / creating / participating / sharing in positive experiences  Support, Feedback and Structured Activity    Patient Response:   Patient responded to session by accepting  feedback, giving feedback and listening  Possible barriers to participation / learning include: N/A    Current Mental Status Exam:   Appearance:  Appropriate   Eye Contact:  Good   Attitude / Demeanor: Cooperative   Speech      Rate / Production: Normal/ Responsive      Volume:  Normal  volume  Orientation:  All  Mood:   Anxious  Normal  Affect:   Appropriate   Thought Content: Clear   Insight:   Good       Plan/Need for Future Services:  Return for therapy in 3 weeks to treat diagnosed problems.    Patient has a current master individualized treatment plan.  See Epic treatment plan for more information.    Referral / Collaboration:  Referral to another professional/service is not indicated at this time..  Emergency Services Needed?  No    Assignment:  Return in 4 weeks     Interactive Complexity:  There are four specific communication difficulties that complicate the work of the primary psychiatric procedure.  Interactive complexity (+34098) may be reported when at least one of these difficulties is present.    Communication difficulties present during current the psychiatric procedure include:  1. None.      Signature/Title:    Doe Avendano, LMFT, [facility]

## 2023-03-07 DIAGNOSIS — E78.5 HYPERLIPIDEMIA LDL GOAL <100: ICD-10-CM

## 2023-03-07 DIAGNOSIS — F32.A DEPRESSION, UNSPECIFIED DEPRESSION TYPE: ICD-10-CM

## 2023-03-07 DIAGNOSIS — F42.9 OBSESSIVE-COMPULSIVE DISORDER, UNSPECIFIED TYPE: ICD-10-CM

## 2023-03-07 RX ORDER — NALTREXONE HYDROCHLORIDE 50 MG/1
50 TABLET, FILM COATED ORAL DAILY
Qty: 30 TABLET | Refills: 0 | Status: SHIPPED | OUTPATIENT
Start: 2023-03-07 | End: 2023-03-13

## 2023-03-07 RX ORDER — CITALOPRAM HYDROBROMIDE 40 MG/1
40 TABLET ORAL EVERY MORNING
Qty: 30 TABLET | Refills: 0 | Status: SHIPPED | OUTPATIENT
Start: 2023-03-07 | End: 2023-03-13

## 2023-03-07 RX ORDER — ATORVASTATIN CALCIUM 10 MG/1
TABLET, FILM COATED ORAL
Qty: 30 TABLET | Refills: 0 | Status: SHIPPED | OUTPATIENT
Start: 2023-03-07 | End: 2023-03-13

## 2023-03-07 NOTE — TELEPHONE ENCOUNTER
Medication requested: atorvastatin (LIPITOR) 10 MG tablet  Last office visit: 6/17/2022  [facility] appointments: none  Medication last refilled: 2/6/2023; 30 + 0 refills    Medication requested: citalopram (CELEXA) 40 MG tablet  Medication last refilled: 2/6/2023; 2/6/2023; 30 + 0 refills    Medication requested: naltrexone (DEPADE/REVIA) 50 MG tablet  Medication last refilled: 2/6/2023; 30 + 0 refills  Last qualifying labs:       PHQ-9 and GAD7 Scores 2/8/2023   PHQ9 TOTAL SCORE 3 (Minimal depression)   PHQ-9 Total Score 3   RAGINI-7 Total Score      Component      Latest Ref Rng & Units 11/15/2021   LDL Cholesterol Calculated      <=100 mg/dL 165 (H)     Medication is being filled for 1 time refill only due to:  Patient needs to be seen because due for mental health f/u visit.  My chart reminder message sent to pt.    KARI Ochoa, RN  03/07/23, 1:50 PM

## 2023-03-08 RX ORDER — ATORVASTATIN CALCIUM 10 MG/1
TABLET, FILM COATED ORAL
Qty: 90 TABLET | OUTPATIENT
Start: 2023-03-08

## 2023-03-09 ENCOUNTER — OFFICE VISIT (OUTPATIENT)
Dept: PSYCHOLOGY | Facility: CLINIC | Age: 53
End: 2023-03-09
Payer: COMMERCIAL

## 2023-03-09 DIAGNOSIS — F34.1 DYSTHYMIC DISORDER: Primary | ICD-10-CM

## 2023-03-09 DIAGNOSIS — F91.8 CONDUCT DISORDER, UNDIFFERENTIATED TYPE: ICD-10-CM

## 2023-03-09 PROCEDURE — 90837 PSYTX W PT 60 MINUTES: CPT | Performed by: MARRIAGE & FAMILY THERAPIST

## 2023-03-09 NOTE — PROGRESS NOTES
[place] [facility] - Progress Note    Date of Service: 3/09/23   Name: Javi Salmeron  : 1970  Medical Record Number: [id number]  Treating Provider: LARS Zuleta LADC  Type of Session: Individual  Present in Session: pt  Session Start and Stop Time: 300pm-405pm  Number of Minutes:  65    SERVICE MODALITY:  In-person    DSM-5 Diagnoses:  300.4 Dysthymia  312.89 (F91.8) Other Specified Disruptive, Impulse Control, and Conduct Disorder (Hypersexual Disorder)       Current Reported Symptoms and Status update:  Changes since last session-has been dating himself  Depression reasonably well managed. Not having issues with CSB.       Progress Toward Treatment Goals:   Stable/Maintenance of progress     Therapeutic Interventions/Treatment Strategies:    Area(s) of treatment focus addressed in this session included Symptom Management and Maintenance of Progress    Processed and explored dating self, increased insecurities, feeling rejected and trying to find his way     Psychotherapist offered support, feedback and validation and reinforced use of skills Treatment modalities used include Interpersonal discussed application of self compassion and explored challenging unrealistic expectations of self/others  Support, Problem Solving and Clarification    Patient Response:   Patient responded to session by accepting feedback, listening and accepting support  Possible barriers to participation / learning include: N/A    Current Mental Status Exam:   Appearance:  Appropriate   Eye Contact:  Good   Attitude / Demeanor: Cooperative   Speech      Rate / Production: Normal/ Responsive      Volume:  Normal  volume  Orientation:  All  Mood:   Anxious  Normal  Affect:   Appropriate   Thought Content: Clear   Insight:   Good       Plan/Need for Future Services:  Return for therapy in 3 weeks to treat diagnosed problems.    Patient has a current master individualized treatment plan.  See Epic treatment  plan for more information.    Referral / Collaboration:  Referral to another professional/service is not indicated at this time..  Emergency Services Needed?  No    Assignment:  Self compassion exercises    Interactive Complexity:  There are four specific communication difficulties that complicate the work of the primary psychiatric procedure.  Interactive complexity (+16508) may be reported when at least one of these difficulties is present.    Communication difficulties present during current the psychiatric procedure include:  1. None.      Signature/Title:    Doe Avendano, LARS, [facility]

## 2023-03-13 ENCOUNTER — OFFICE VISIT (OUTPATIENT)
Dept: FAMILY MEDICINE | Facility: CLINIC | Age: 53
End: 2023-03-13
Payer: COMMERCIAL

## 2023-03-13 VITALS
TEMPERATURE: 97.2 F | HEART RATE: 72 BPM | SYSTOLIC BLOOD PRESSURE: 130 MMHG | WEIGHT: 199.5 LBS | BODY MASS INDEX: 27.06 KG/M2 | RESPIRATION RATE: 17 BRPM | DIASTOLIC BLOOD PRESSURE: 81 MMHG | OXYGEN SATURATION: 97 %

## 2023-03-13 DIAGNOSIS — Z91.89 AT RISK FOR IMPAIRED FUNCTION OF LIVER: ICD-10-CM

## 2023-03-13 DIAGNOSIS — Z12.5 SCREENING FOR PROSTATE CANCER: ICD-10-CM

## 2023-03-13 DIAGNOSIS — R73.09 ELEVATED GLUCOSE: ICD-10-CM

## 2023-03-13 DIAGNOSIS — E78.5 HYPERLIPIDEMIA LDL GOAL <100: ICD-10-CM

## 2023-03-13 DIAGNOSIS — I10 ESSENTIAL HYPERTENSION, BENIGN: Primary | ICD-10-CM

## 2023-03-13 DIAGNOSIS — F42.9 OBSESSIVE-COMPULSIVE DISORDER, UNSPECIFIED TYPE: ICD-10-CM

## 2023-03-13 DIAGNOSIS — F32.A DEPRESSION, UNSPECIFIED DEPRESSION TYPE: ICD-10-CM

## 2023-03-13 LAB
ALBUMIN SERPL BCG-MCNC: 4.3 G/DL (ref 3.5–5.2)
ALP SERPL-CCNC: 54 U/L (ref 40–129)
ALT SERPL W P-5'-P-CCNC: 16 U/L (ref 10–50)
ANION GAP SERPL CALCULATED.3IONS-SCNC: 10 MMOL/L (ref 7–15)
AST SERPL W P-5'-P-CCNC: 16 U/L (ref 10–50)
BILIRUB SERPL-MCNC: 0.5 MG/DL
BUN SERPL-MCNC: 22.5 MG/DL (ref 6–20)
CALCIUM SERPL-MCNC: 9 MG/DL (ref 8.6–10)
CHLORIDE SERPL-SCNC: 104 MMOL/L (ref 98–107)
CHOLEST SERPL-MCNC: 156 MG/DL
CREAT SERPL-MCNC: 1.05 MG/DL (ref 0.67–1.17)
DEPRECATED HCO3 PLAS-SCNC: 28 MMOL/L (ref 22–29)
GFR SERPL CREATININE-BSD FRML MDRD: 84 ML/MIN/1.73M2
GLUCOSE SERPL-MCNC: 117 MG/DL (ref 70–99)
HBA1C MFR BLD: 5.7 % (ref 0–5.6)
HDLC SERPL-MCNC: 41 MG/DL
LDLC SERPL CALC-MCNC: 98 MG/DL
NONHDLC SERPL-MCNC: 115 MG/DL
POTASSIUM SERPL-SCNC: 3.7 MMOL/L (ref 3.4–5.3)
PROT SERPL-MCNC: 6.7 G/DL (ref 6.4–8.3)
PSA SERPL DL<=0.01 NG/ML-MCNC: 0.85 NG/ML (ref 0–3.5)
SODIUM SERPL-SCNC: 142 MMOL/L (ref 136–145)
TRIGL SERPL-MCNC: 87 MG/DL

## 2023-03-13 PROCEDURE — 80053 COMPREHEN METABOLIC PANEL: CPT | Performed by: FAMILY MEDICINE

## 2023-03-13 PROCEDURE — 80061 LIPID PANEL: CPT | Performed by: FAMILY MEDICINE

## 2023-03-13 PROCEDURE — G0103 PSA SCREENING: HCPCS | Performed by: FAMILY MEDICINE

## 2023-03-13 RX ORDER — ATORVASTATIN CALCIUM 10 MG/1
TABLET, FILM COATED ORAL
Qty: 90 TABLET | Refills: 3 | Status: SHIPPED | OUTPATIENT
Start: 2023-03-13 | End: 2024-03-27

## 2023-03-13 RX ORDER — NALTREXONE HYDROCHLORIDE 50 MG/1
50 TABLET, FILM COATED ORAL DAILY
Qty: 90 TABLET | Refills: 3 | Status: SHIPPED | OUTPATIENT
Start: 2023-03-13 | End: 2024-04-05

## 2023-03-13 RX ORDER — AMLODIPINE BESYLATE 10 MG/1
10 TABLET ORAL DAILY
Qty: 90 TABLET | Refills: 3 | Status: SHIPPED | OUTPATIENT
Start: 2023-03-13 | End: 2023-12-21

## 2023-03-13 RX ORDER — HYDROCHLOROTHIAZIDE 25 MG/1
25 TABLET ORAL DAILY
Qty: 90 TABLET | Refills: 3 | Status: SHIPPED | OUTPATIENT
Start: 2023-03-13 | End: 2024-04-05

## 2023-03-13 RX ORDER — CITALOPRAM HYDROBROMIDE 40 MG/1
40 TABLET ORAL EVERY MORNING
Qty: 90 TABLET | Refills: 3 | Status: SHIPPED | OUTPATIENT
Start: 2023-03-13 | End: 2024-03-27

## 2023-03-13 ASSESSMENT — ANXIETY QUESTIONNAIRES
2. NOT BEING ABLE TO STOP OR CONTROL WORRYING: NOT AT ALL
5. BEING SO RESTLESS THAT IT IS HARD TO SIT STILL: NOT AT ALL
IF YOU CHECKED OFF ANY PROBLEMS ON THIS QUESTIONNAIRE, HOW DIFFICULT HAVE THESE PROBLEMS MADE IT FOR YOU TO DO YOUR WORK, TAKE CARE OF THINGS AT HOME, OR GET ALONG WITH OTHER PEOPLE: NOT DIFFICULT AT ALL
GAD7 TOTAL SCORE: 0
1. FEELING NERVOUS, ANXIOUS, OR ON EDGE: NOT AT ALL
GAD7 TOTAL SCORE: 0
3. WORRYING TOO MUCH ABOUT DIFFERENT THINGS: NOT AT ALL
7. FEELING AFRAID AS IF SOMETHING AWFUL MIGHT HAPPEN: NOT AT ALL
6. BECOMING EASILY ANNOYED OR IRRITABLE: NOT AT ALL

## 2023-03-13 ASSESSMENT — PATIENT HEALTH QUESTIONNAIRE - PHQ9
5. POOR APPETITE OR OVEREATING: NOT AT ALL
SUM OF ALL RESPONSES TO PHQ QUESTIONS 1-9: 2

## 2023-03-13 NOTE — NURSING NOTE
52 year old  Chief Complaint   Patient presents with     Recheck Medication     Go over all/renewals       Blood pressure 130/81, pulse 72, temperature 97.2  F (36.2  C), temperature source Temporal, resp. rate 17, weight 90.5 kg (199 lb 8 oz), SpO2 97 %. Body mass index is 27.06 kg/m .  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)      Major depression in complete remission (H)       Wt Readings from Last 2 Encounters:   03/13/23 90.5 kg (199 lb 8 oz)   08/16/22 90.7 kg (200 lb)     BP Readings from Last 3 Encounters:   03/13/23 130/81   06/17/22 123/73   11/15/21 (!) 141/87         Current Outpatient Medications   Medication     amLODIPine (NORVASC) 10 MG tablet     atorvastatin (LIPITOR) 10 MG tablet     citalopram (CELEXA) 40 MG tablet     hydrochlorothiazide (HYDRODIURIL) 25 MG tablet     naltrexone (DEPADE/REVIA) 50 MG tablet     MAGNESIUM-OXIDE 400 (240 Mg) MG tablet     terbinafine (LAMISIL) 250 MG tablet     No current facility-administered medications for this visit.       Social History     Tobacco Use     Smoking status: Never     Smokeless tobacco: Never   Substance Use Topics     Alcohol use: Yes     Comment: one or two a month     Drug use: No       Health Maintenance Due   Topic Date Due     ADVANCE CARE PLANNING  Never done     DEPRESSION ACTION PLAN  Never done     HEPATITIS B IMMUNIZATION (1 of 3 - 3-dose series) Never done     COVID-19 Vaccine (4 - Booster for Pfizer series) 04/20/2022     MENTAL HEALTH TX PLAN  09/04/2022     YEARLY PREVENTIVE VISIT  11/15/2022     RAGINI ASSESSMENT  11/15/2022       No results found for: PAP      March 13, 2023 7:53 AM

## 2023-03-13 NOTE — PATIENT INSTRUCTIONS
ASSESSMENT/PLAN:    Javi is a 51 yo who is on multiple medications and not been seen for 1.5 years.   Will refill medications .  Also, check labs in preparation for annual exam.     Diagnosis and plan as follows:    1. Essential hypertension, benign  - amLODIPine (NORVASC) 10 MG tablet; Take 1 tablet (10 mg) by mouth daily  Dispense: 90 tablet; Refill: 3  - hydrochlorothiazide (HYDRODIURIL) 25 MG tablet; Take 1 tablet (25 mg) by mouth daily  Dispense: 90 tablet; Refill: 3    2. Hyperlipidemia LDL goal <100  - atorvastatin (LIPITOR) 10 MG tablet; 1 by mouth daily  Dispense: 90 tablet; Refill: 3  - Lipid panel; Future    3. Depression, unspecified depression type  - citalopram (CELEXA) 40 MG tablet; Take 1 tablet (40 mg) by mouth every morning  Dispense: 90 tablet; Refill: 3    4. Obsessive-compulsive disorder, unspecified type  - naltrexone (DEPADE/REVIA) 50 MG tablet; Take 1 tablet (50 mg) by mouth daily  Dispense: 90 tablet; Refill: 3    5. Elevated glucose    - Hemoglobin A1c; Future    6. At risk for impaired function of liver  - Comprehensive metabolic panel; Future    7. Screening for prostate cancer    - Prostate spec antigen screen; Future    8. For Neck pain, will consider return to [facility] Rodriguez MD    Follow-up For annual exam within next 4 weeks.     --Mt Becker MD

## 2023-03-13 NOTE — PROGRESS NOTES
Medical assistant intake:  Javi Salmeron is a 52 year old male who presents to [facility] today for Recheck Medication (Go over all/renewals)        ASSESSMENT/PLAN:    Javi is a 53 yo who is on multiple medications and not been seen for 1.5 years.   Will refill medications .  Also, check labs in preparation for annual exam.     Diagnosis and plan as follows:    1. Essential hypertension, benign  - amLODIPine (NORVASC) 10 MG tablet; Take 1 tablet (10 mg) by mouth daily  Dispense: 90 tablet; Refill: 3  - hydrochlorothiazide (HYDRODIURIL) 25 MG tablet; Take 1 tablet (25 mg) by mouth daily  Dispense: 90 tablet; Refill: 3    2. Hyperlipidemia LDL goal <100  - atorvastatin (LIPITOR) 10 MG tablet; 1 by mouth daily  Dispense: 90 tablet; Refill: 3  - Lipid panel; Future    3. Depression, unspecified depression type  - citalopram (CELEXA) 40 MG tablet; Take 1 tablet (40 mg) by mouth every morning  Dispense: 90 tablet; Refill: 3    4. Obsessive-compulsive disorder, unspecified type  - naltrexone (DEPADE/REVIA) 50 MG tablet; Take 1 tablet (50 mg) by mouth daily  Dispense: 90 tablet; Refill: 3    5. Elevated glucose    - Hemoglobin A1c; Future    6. At risk for impaired function of liver  - Comprehensive metabolic panel; Future    7. Screening for prostate cancer    - Prostate spec antigen screen; Future    8. For Neck pain, will consider return to [facility] Rodriguez MD    Follow-up For annual exam within next 4 weeks.     --Mt Becker MD      SUBJECTIVE:   Javi is a 53 yo who I have seen on several occasions in the past, but none for past 1.5 years. He presents mainly for refills on medications.    He's feeling well.   Work is a bit stressful, but that's because he was just promoted to a higher position.     Wt Readings from Last 5 Encounters:   03/13/23 90.5 kg (199 lb 8 oz)   08/16/22 90.7 kg (200 lb)   06/17/22 90.9 kg (200 lb 8 oz)   06/06/22 88.5 kg (195 lb)   03/31/22 87.1 kg (192 lb)     Was 194 lbs in Nov.  "2021      Review Of Systems:    See subjective.   Has otherwise been in usual state of health, e.g.   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)      Major depression in complete remission (H)       Current Outpatient Medications   Medication Sig Dispense Refill     amLODIPine (NORVASC) 10 MG tablet TAKE 1 TABLET(10 MG) BY MOUTH DAILY 90 tablet 1     atorvastatin (LIPITOR) 10 MG tablet TAKE 1 TABLET(10 MG) BY MOUTH DAILY** DUE TO BE SEEN BY DOCTOR** 30 tablet 0     citalopram (CELEXA) 40 MG tablet Take 1 tablet (40 mg) by mouth every morning 30 tablet 0     hydrochlorothiazide (HYDRODIURIL) 25 MG tablet TAKE 1 TABLET(25 MG) BY MOUTH DAILY 30 tablet 0     naltrexone (DEPADE/REVIA) 50 MG tablet Take 1 tablet (50 mg) by mouth daily NEEDS APPT FOR FURTHER REFILLS. 30 tablet 0     MAGNESIUM-OXIDE 400 (240 Mg) MG tablet TAKE 1 TABLET(400 MG) BY MOUTH AT BEDTIME (Patient not taking: Reported on 3/13/2023) 90 tablet 0     terbinafine (LAMISIL) 250 MG tablet Take 1 tablet (250 mg) by mouth daily (Patient not taking: Reported on 3/13/2023) 90 tablet 0       Allergies   Allergen Reactions     Dust Mites      Grass      Ragweeds      Codeine Anxiety     \"gets loopy\" - doesn't like how it feels        Social:   Works at [employer].       OBJECTIVE    Vitals: /81 (BP Location: Right arm, Patient Position: Sitting, Cuff Size: Adult Large)   Pulse 72   Temp 97.2  F (36.2  C) (Temporal)   Resp 17   Wt 90.5 kg (199 lb 8 oz)   SpO2 97%   BMI 27.06 kg/m    BMI= Body mass index is 27.06 kg/m .  Appears well.  Normal gait. " No edema.       SEE TOP OF NOTE FOR ASSESSMENT AND PLAN    --Mt Becker MD  [facility], Department of Family Medicine and Community Health

## 2023-03-24 ENCOUNTER — VIRTUAL VISIT (OUTPATIENT)
Dept: PSYCHOLOGY | Facility: CLINIC | Age: 53
End: 2023-03-24
Payer: COMMERCIAL

## 2023-03-24 DIAGNOSIS — F34.1 DYSTHYMIC DISORDER: Primary | ICD-10-CM

## 2023-03-24 DIAGNOSIS — F91.8 CONDUCT DISORDER, UNDIFFERENTIATED TYPE: ICD-10-CM

## 2023-03-24 PROCEDURE — 90834 PSYTX W PT 45 MINUTES: CPT | Mod: VID | Performed by: MARRIAGE & FAMILY THERAPIST

## 2023-03-24 NOTE — PROGRESS NOTES
[facility] - Progress Note    Date of Service: 3/24/23   Name: Javi Salmeron  : 1970  Medical Record Number: [id number]  Treating Provider: LARS Goncalves LADC  Type of Session: Individual  Present in Session: pt  Session Start and Stop Time: 0306-4867  Number of Minutes:  42    SERVICE MODALITY:  Video Visit:      Provider verified identity through the following two step process.  Patient provided:  Patient is known previously to provider    Telemedicine Visit: The patient's condition can be safely assessed and treated via synchronous audio and visual telemedicine encounter.      Reason for Telemedicine Visit: Services only offered telehealth    Originating Site (Patient Location): Patient's home    Distant Site (Provider Location): [facility]    Consent:  The patient/guardian has verbally consented to: the potential risks and benefits of telemedicine (video visit) versus in person care; bill my insurance or make self-payment for services provided; and responsibility for payment of non-covered services.     Patient would like the video invitation sent by:  My Chart    Mode of Communication:  Video Conference via [facility]    Distant Location (Provider):  Off-site    As the provider I attest to compliance with applicable laws and regulations related to telemedicine.    DSM-5 Diagnoses:  300.4 Dysthymia  312.89 (F91.8) Other Specified Disruptive, Impulse Control, and Conduct Disorder (Hypersexual Disorder)       Current Reported Symptoms and Status update:  Changes since last session-did not do homework-forgot to print it out-did read through it for self compassion-is helping son Rodger move to [place]-went to dr for phsyical   Depression reasonably well managed. Not having issues with CSB.       Progress Toward Treatment Goals:   Stable/Maintenance of progress     Therapeutic Interventions/Treatment Strategies:    Area(s) of treatment focus  addressed in this session included Symptom Management and Maintenance of Progress    Has been thinking about past friend had dated, looked her up, she is engaged to be , has been dating self, pedicure, played bingo, hasnt taken a bath yet-    Psychotherapist offered support, feedback and validation and reinforced use of skills Treatment modalities used include Dialectical Behavioral Therapy Interpersonal Coping Skills: Assisted patient in understanding the purpose of planning / creating / participating / sharing in positive experiences and discussed application of self compassion and explored challenging unrealistic expectations of self/others  Support and Feedback    Patient Response:   Patient responded to session by listening, focusing on goals and accepting support  Possible barriers to participation / learning include: N/A    Current Mental Status Exam:   Appearance:  Appropriate   Eye Contact:  Good   Attitude / Demeanor: Cooperative   Speech      Rate / Production: Normal/ Responsive      Volume:  Normal  volume  Orientation:  All  Mood:   Normal Euthymic  Affect:   Appropriate  Bright   Thought Content: Clear   Insight:   Good       Plan/Need for Future Services:  Return for therapy in 2 weeks to treat diagnosed problems.    Patient has a current master individualized treatment plan.  See Epic treatment plan for more information.    Referral / Collaboration:  Referral to another professional/service is not indicated at this time..  Emergency Services Needed?  No    Assignment:  Bath  Self compassion    Interactive Complexity:  There are four specific communication difficulties that complicate the work of the primary psychiatric procedure.  Interactive complexity (+[id number]) may be reported when at least one of these difficulties is present.    Communication difficulties present during current the psychiatric procedure include:  1. None.      Signature/Title:    Rosalinda Avendano, J[place]IAN[place]FT, [facility]

## 2023-03-24 NOTE — NURSING NOTE
Is the patient currently in the state of [place]? YES - at home.    Visit mode:VIDEO    If the visit is dropped, the patient can be reconnected by: VIDEO VISIT: Text to cell phone:   Telephone Information:   Mobile [phone]       Will anyone else be joining the visit? No  (If patient encounters technical issues they should call [phone])    How would you like to obtain your AVS? MyChart    Are changes needed to the allergy or medication list? N/A    Rooming Documentation: N/A      Reason for visit: Follow Up    APOORVA Barnes

## 2023-05-04 ENCOUNTER — OFFICE VISIT (OUTPATIENT)
Dept: PSYCHOLOGY | Facility: CLINIC | Age: 53
End: 2023-05-04
Payer: COMMERCIAL

## 2023-05-04 DIAGNOSIS — F34.1 DYSTHYMIC DISORDER: Primary | ICD-10-CM

## 2023-05-04 DIAGNOSIS — F91.8 CONDUCT DISORDER, UNDIFFERENTIATED TYPE: ICD-10-CM

## 2023-05-04 PROCEDURE — 90853 GROUP PSYCHOTHERAPY: CPT | Performed by: MARRIAGE & FAMILY THERAPIST

## 2023-05-04 NOTE — GROUP NOTE
[facility]  [address], [address]  [place]  [address]    Group Progress Note  [facility] - Progress Note    Date of Service: 23   Name: Javi Salmeron  : 1970  Medical Record Number: [id number]  START TIME:  4:30 PM  END TIME:  6:30 PM  FACILITATOR(S): Rosalinda Avendano, LMFT, [facility]  TOPIC: [facility] Group Therapy  Type of Session: Group  :  Number of Attendees:  7  Number of Minutes:  /120    SERVICE MODALITY:  In-person      DSM-5 Diagnoses:  300.4 Dysthymia  312.89 (F91.8) Other Specified Disruptive, Impulse Control, and Conduct Disorder (Hypersexual Disorder)       Current Reported Symptoms and Status update:  Changes since last session-struggling with dating  Depression reasonably well managed. Not having issues with CSB.       Progress Toward Treatment Goals:   Stable/Maintenance of progress     Therapeutic Interventions/Treatment Strategies:    Area(s) of treatment focus addressed in this session included Symptom Management and Maintenance of Progress    Patient checked in about their mental health symptoms, healthy coping skills used over the week, negative coping skills used over the week, what sexual behaviors they engaged in-fantasy, masturbation, sex, their comfort level with their behaviors, if there were triggers, urges to violate boundaries, and violations of boundaries.  They took time to process about the past month and stress they provided support and feedback to others in the group.    Psychotherapist offered support, feedback and validation and reinforced use of skills Treatment modalities used include [facility] THERAPY INTERVENTIONS: Motivational Interviewing Group Dynamic Therapy  Interpersonal explored challenging unrealistic expectations of self/others  Support, Feedback and Structured Activity    Patient Response:   Patient responded to session by accepting feedback, giving feedback and listening  Possible barriers to participation /  learning include: N/A    Current Mental Status Exam:   Appearance:  Appropriate   Eye Contact:  Good   Attitude / Demeanor: Cooperative   Speech      Rate / Production: Normal/ Responsive      Volume:  Normal  volume  Orientation:  All  Mood:   Normal  Affect:   Appropriate   Thought Content: Clear   Insight:   Good       Plan/Need for Future Services:  Return for therapy in 4 weeks to treat diagnosed problems.    Patient has a current master individualized treatment plan.  See Epic treatment plan for more information.    Referral / Collaboration:  Referral to another professional/service is not indicated at this time..  Emergency Services Needed?  No    Assignment:  Return in 4 weeks    Interactive Complexity:  There are four specific communication difficulties that complicate the work of the primary psychiatric procedure.  Interactive complexity (+65264) may be reported when at least one of these difficulties is present.    Communication difficulties present during current the psychiatric procedure include:  1. None.      Signature/Title:    LARS Zuleta, [facility]

## 2023-05-31 ENCOUNTER — VIRTUAL VISIT (OUTPATIENT)
Dept: PSYCHOLOGY | Facility: CLINIC | Age: 53
End: 2023-05-31
Payer: COMMERCIAL

## 2023-05-31 DIAGNOSIS — F91.8 CONDUCT DISORDER, UNDIFFERENTIATED TYPE: ICD-10-CM

## 2023-05-31 DIAGNOSIS — F34.1 DYSTHYMIC DISORDER: Primary | ICD-10-CM

## 2023-05-31 PROCEDURE — 90837 PSYTX W PT 60 MINUTES: CPT | Mod: VID | Performed by: MARRIAGE & FAMILY THERAPIST

## 2023-05-31 NOTE — NURSING NOTE
Is the patient currently in the state of [place]? YES    Visit mode:VIDEO    If the visit is dropped, the patient can be reconnected by: VIDEO VISIT: Text to cell phone:   Telephone Information:   Mobile [phone]       Will anyone else be joining the visit? No  (If patient encounters technical issues they should call [phone])    How would you like to obtain your AVS? MyChart    Are changes needed to the allergy or medication list? N/A    Rooming Documentation: Questionnaire(s) not done per department protocol.    Reason for visit: APOORVA Salazar

## 2023-05-31 NOTE — PROGRESS NOTES
[facility] - Progress Note    Date of Service: 23   Name: Javi Salmeron  : 1970  Medical Record Number: [id number]  Treating Provider: LARS Goncalves LADC  Type of Session: Individual  Present in Session: pt  Session Start and Stop Time: 300pm-353pm  Number of Minutes:  53    SERVICE MODALITY:  Video Visit:      Provider verified identity through the following two step process.  Patient provided:  Patient is known previously to provider    Telemedicine Visit: The patient's condition can be safely assessed and treated via synchronous audio and visual telemedicine encounter.      Reason for Telemedicine Visit: Services only offered telehealth    Originating Site (Patient Location): Patient's home    Distant Site (Provider Location): [facility]    Consent:  The patient/guardian has verbally consented to: the potential risks and benefits of telemedicine (video visit) versus in person care; bill my insurance or make self-payment for services provided; and responsibility for payment of non-covered services.     Patient would like the video invitation sent by:  My Chart    Mode of Communication:  Video Conference via [facility]    Distant Location (Provider):  Off-site    As the provider I attest to compliance with applicable laws and regulations related to telemedicine.    DSM-5 Diagnoses:  300.4 Dysthymia  312.89 (F91.8) Other Specified Disruptive, Impulse Control, and Conduct Disorder (Hypersexual Disorder)       Current Reported Symptoms and Status update:  Changes since last session-increased depression-bad sexual experience recently-feeling bad about self   Depression reasonably well managed. Not having issues with CSB.       Progress Toward Treatment Goals:   Stable/Maintenance of progress     Therapeutic Interventions/Treatment Strategies:    Area(s) of treatment focus addressed in this session included Symptom Management,  Interpersonal Relationship Skills and Maintenance of Progress      Psychotherapist offered support, feedback and validation and reinforced use of skills Treatment modalities used include Motivational Interviewing Solution Focused Therapy Interpersonal Coping Skills: Discussed use of self-soothe skills to decrease distress in the body, Facilitated understanding of  what factors may contribute to symptom relapse and skills plan to manage symptom relapse  and Addressed barriers to utilizing coping skills when in distress, Relationship Skills: Assisted patients in implementing more effective communication skills in their relationships and Discussed strategies to promote healthier understanding of interpersonal relationships and discussed application of self compassion and explored challenging unrealistic expectations of self/others  Support, Feedback, Problem Solving and Clarification    Patient Response:   Patient responded to session by accepting feedback, listening, focusing on goals and accepting support  Possible barriers to participation / learning include: N/A    Current Mental Status Exam:   Appearance:  Appropriate   Eye Contact:  Good   Attitude / Demeanor: Cooperative   Speech      Rate / Production: Normal/ Responsive Talkative      Volume:  Normal  volume  Orientation:  All  Mood:   Depressed   Affect:   Subdued   Thought Content: Clear   Insight:   Good       Plan/Need for Future Services:  Return for therapy in 4 weeks to treat diagnosed problems.    Patient has a current master individualized treatment plan.  See Epic treatment plan for more information.    Referral / Collaboration:  Referral to another professional/service is not indicated at this time..  Emergency Services Needed?  No    Assignment:  Self care     Interactive Complexity:  There are four specific communication difficulties that complicate the work of the primary psychiatric procedure.  Interactive complexity (+91607) may be reported  when at least one of these difficulties is present.    Communication difficulties present during current the psychiatric procedure include:  1. None.      Signature/Title:    LARS Goncalves, [facility]    Virtual Visit Details    Type of service:  Video Visit     Originating Location (pt. Location): Home    Distant Location (provider location):  Off-site  Platform used for Video Visit: Myles

## 2023-06-12 DIAGNOSIS — F42.9 OBSESSIVE-COMPULSIVE DISORDER, UNSPECIFIED TYPE: ICD-10-CM

## 2023-06-13 RX ORDER — NALTREXONE HYDROCHLORIDE 50 MG/1
TABLET, FILM COATED ORAL
Qty: 30 TABLET | OUTPATIENT
Start: 2023-06-13

## 2023-06-15 ENCOUNTER — VIRTUAL VISIT (OUTPATIENT)
Dept: PSYCHOLOGY | Facility: CLINIC | Age: 53
End: 2023-06-15
Payer: COMMERCIAL

## 2023-06-15 DIAGNOSIS — F34.1 DYSTHYMIC DISORDER: Primary | ICD-10-CM

## 2023-06-15 DIAGNOSIS — F91.8 CONDUCT DISORDER, UNDIFFERENTIATED TYPE: ICD-10-CM

## 2023-06-15 PROCEDURE — 90837 PSYTX W PT 60 MINUTES: CPT | Mod: VID | Performed by: MARRIAGE & FAMILY THERAPIST

## 2023-06-15 ASSESSMENT — PATIENT HEALTH QUESTIONNAIRE - PHQ9
SUM OF ALL RESPONSES TO PHQ QUESTIONS 1-9: 3
SUM OF ALL RESPONSES TO PHQ QUESTIONS 1-9: 3
10. IF YOU CHECKED OFF ANY PROBLEMS, HOW DIFFICULT HAVE THESE PROBLEMS MADE IT FOR YOU TO DO YOUR WORK, TAKE CARE OF THINGS AT HOME, OR GET ALONG WITH OTHER PEOPLE: NOT DIFFICULT AT ALL

## 2023-06-15 NOTE — PROGRESS NOTES
Virtual Visit Details    Type of service:  Video Visit     Originating Location (pt. Location): Home    Distant Location (provider location):  On-site  Platform used for Video Visit: [facility] - Progress Note    Date of Service: 6/15/23   Name: Javi Salmeron  : 1970  Medical Record Number: [id number]  Treating Provider:LARS Goncalves, NIA  Type of Session: Individual  Present in Session: pt  Session Start and Stop Time: 6325-4101  Number of Minutes:  53    SERVICE MODALITY:  Video Visit:      Provider verified identity through the following two step process.  Patient provided:  Patient is known previously to provider    Telemedicine Visit: The patient's condition can be safely assessed and treated via synchronous audio and visual telemedicine encounter.      Reason for Telemedicine Visit: Services only offered telehealth    Originating Site (Patient Location): Patient's home    Distant Site (Provider Location): [facility]    Consent:  The patient/guardian has verbally consented to: the potential risks and benefits of telemedicine (video visit) versus in person care; bill my insurance or make self-payment for services provided; and responsibility for payment of non-covered services.     Patient would like the video invitation sent by:  My Chart    Mode of Communication:  Video Conference via [facility]    Distant Location (Provider):  On-site    As the provider I attest to compliance with applicable laws and regulations related to telemedicine.    DSM-5 Diagnoses:  300.4 Dysthymia  312.89 (F91.8) Other Specified Disruptive, Impulse Control, and Conduct Disorder (Hypersexual Disorder)       Current Reported Symptoms and Status update:  Changes since last session-doing lots of self care!  Depression reasonably well managed. Not having issues with CSB.       Progress Toward Treatment Goals:   Stable/Maintenance of progress  "    Therapeutic Interventions/Treatment Strategies:    Area(s) of treatment focus addressed in this session included Symptom Management, Interpersonal Relationship Skills and Maintenance of Progress    What used to do was to go on game sites to talk to women-  Had feeling of emptiness since its not \"real\"  Reached out to chelly again-said he deserves a better way of breaking up than 2 paragraphs-she said \"adriana I do love you\" but cant say it during the relationship, she called him best friend-it made him feel good for about 30 seconds reached out 2 more x  Got a job interview-get paid more even though the job sucks    Psychotherapist offered support, feedback and validation and reinforced use of skills Treatment modalities used include Motivational Interviewing Solution Focused Therapy Mindfulness based intervention Interpersonal Cognitive Restructuring:  Assisted patient in identifying new neutral/positive core beliefs, Relationship Skills: Discussed strategies to promote healthier understanding of interpersonal relationships and discussed application of self compassion and explored challenging unrealistic expectations of self/others  Support, Feedback, Problem Solving and Clarification    Patient Response:   Patient responded to session by accepting feedback, listening, focusing on goals and accepting support  Possible barriers to participation / learning include: N/A    Current Mental Status Exam:   Appearance:  Appropriate   Eye Contact:  Good   Attitude / Demeanor: Cooperative   Speech      Rate / Production: Normal/ Responsive Talkative      Volume:  Normal  volume  Orientation:  All  Mood:   Depressed   Affect:   Appropriate   Thought Content: Clear   Insight:   Good       Plan/Need for Future Services:  Return for therapy in 4 weeks to treat diagnosed problems.    Patient has a current master individualized treatment plan.  See Epic treatment plan for more information.    Referral / Collaboration:  Referral to " another professional/service is not indicated at this time..  Emergency Services Needed?  No    Assignment:  Continued maintenance and self care     Interactive Complexity:  There are four specific communication difficulties that complicate the work of the primary psychiatric procedure.  Interactive complexity (+29885) may be reported when at least one of these difficulties is present.    Communication difficulties present during current the psychiatric procedure include:  1. None.      Signature/Title:    Rosalinda Avendano, LMFT, [facility]      Answers for HPI/ROS submitted by the patient on 6/15/2023  If you checked off any problems, how difficult have these problems made it for you to do your work, take care of things at home, or get along with other people?: Not difficult at all  PHQ9 TOTAL SCORE: 3

## 2023-06-15 NOTE — NURSING NOTE
Is the patient currently in the state of [place]? YES    Visit mode:VIDEO    If the visit is dropped, the patient can be reconnected by: VIDEO VISIT: Text to cell phone:   Telephone Information:   Mobile [phone]       Will anyone else be joining the visit? No  (If patient encounters technical issues they should call [phone])    How would you like to obtain your AVS? MyChart    Are changes needed to the allergy or medication list? N/A    Rooming Documentation: Questionnaire(s) not done per department protocol.    Reason for visit: RECHAPOORVA Boogie

## 2023-06-19 ENCOUNTER — OFFICE VISIT (OUTPATIENT)
Dept: FAMILY MEDICINE | Facility: CLINIC | Age: 53
End: 2023-06-19
Payer: COMMERCIAL

## 2023-06-19 VITALS
HEART RATE: 81 BPM | SYSTOLIC BLOOD PRESSURE: 126 MMHG | TEMPERATURE: 98.1 F | WEIGHT: 193.5 LBS | DIASTOLIC BLOOD PRESSURE: 85 MMHG | BODY MASS INDEX: 26.24 KG/M2

## 2023-06-19 DIAGNOSIS — K59.00 CONSTIPATION, UNSPECIFIED CONSTIPATION TYPE: ICD-10-CM

## 2023-06-19 DIAGNOSIS — R73.09 ELEVATED GLUCOSE: ICD-10-CM

## 2023-06-19 DIAGNOSIS — Z11.3 SCREENING FOR STDS (SEXUALLY TRANSMITTED DISEASES): Primary | ICD-10-CM

## 2023-06-19 PROCEDURE — 86780 TREPONEMA PALLIDUM: CPT | Performed by: FAMILY MEDICINE

## 2023-06-19 PROCEDURE — 87591 N.GONORRHOEAE DNA AMP PROB: CPT | Performed by: FAMILY MEDICINE

## 2023-06-19 PROCEDURE — 87491 CHLMYD TRACH DNA AMP PROBE: CPT | Performed by: FAMILY MEDICINE

## 2023-06-19 PROCEDURE — 87389 HIV-1 AG W/HIV-1&-2 AB AG IA: CPT | Performed by: FAMILY MEDICINE

## 2023-06-19 ASSESSMENT — ANXIETY QUESTIONNAIRES
6. BECOMING EASILY ANNOYED OR IRRITABLE: NOT AT ALL
IF YOU CHECKED OFF ANY PROBLEMS ON THIS QUESTIONNAIRE, HOW DIFFICULT HAVE THESE PROBLEMS MADE IT FOR YOU TO DO YOUR WORK, TAKE CARE OF THINGS AT HOME, OR GET ALONG WITH OTHER PEOPLE: NOT DIFFICULT AT ALL
GAD7 TOTAL SCORE: 1
3. WORRYING TOO MUCH ABOUT DIFFERENT THINGS: NOT AT ALL
7. FEELING AFRAID AS IF SOMETHING AWFUL MIGHT HAPPEN: NOT AT ALL
2. NOT BEING ABLE TO STOP OR CONTROL WORRYING: NOT AT ALL
5. BEING SO RESTLESS THAT IT IS HARD TO SIT STILL: NOT AT ALL
GAD7 TOTAL SCORE: 1
1. FEELING NERVOUS, ANXIOUS, OR ON EDGE: SEVERAL DAYS

## 2023-06-19 ASSESSMENT — PATIENT HEALTH QUESTIONNAIRE - PHQ9
5. POOR APPETITE OR OVEREATING: NOT AT ALL
SUM OF ALL RESPONSES TO PHQ QUESTIONS 1-9: 3

## 2023-06-19 NOTE — NURSING NOTE
52 year old  Chief Complaint   Patient presents with     Follow Up     Going over test results. STI testing.     Gastrointestinal Problem     Blood stools -- constipation? Ongoing for about 3 weeks.       Blood pressure 126/85, pulse 81, temperature 98.1  F (36.7  C), temperature source Temporal, weight 87.8 kg (193 lb 8 oz). Body mass index is 26.24 kg/m .  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)      Major depression in complete remission (H)       Wt Readings from Last 2 Encounters:   06/19/23 87.8 kg (193 lb 8 oz)   03/13/23 90.5 kg (199 lb 8 oz)     BP Readings from Last 3 Encounters:   06/19/23 126/85   03/13/23 130/81   06/17/22 123/73         Current Outpatient Medications   Medication     amLODIPine (NORVASC) 10 MG tablet     atorvastatin (LIPITOR) 10 MG tablet     citalopram (CELEXA) 40 MG tablet     hydrochlorothiazide (HYDRODIURIL) 25 MG tablet     naltrexone (DEPADE/REVIA) 50 MG tablet     MAGNESIUM-OXIDE 400 (240 Mg) MG tablet     terbinafine (LAMISIL) 250 MG tablet     No current facility-administered medications for this visit.       Social History     Tobacco Use     Smoking status: Never     Smokeless tobacco: Never   Substance Use Topics     Alcohol use: Yes     Comment: one or two a month     Drug use: No       Health Maintenance Due   Topic Date Due     ADVANCE CARE PLANNING  Never done     DEPRESSION ACTION PLAN  Never done     HEPATITIS B IMMUNIZATION (1 of 3 - 3-dose series) Never done     COVID-19 Vaccine (4 - Pfizer series) 04/20/2022     MENTAL HEALTH TX PLAN  09/04/2022     YEARLY PREVENTIVE VISIT  11/15/2022     ZOSTER IMMUNIZATION (2 of 2) 04/20/2023       No results found for: PAP      June 19, 2023 4:52 PM

## 2023-06-19 NOTE — PROGRESS NOTES
Medical assistant intake:  Javi Salmeron is a 52 year old male who presents to [facility] today for Follow Up (Going over test results. STI testing.) and Gastrointestinal Problem (Blood stools -- constipation? Ongoing for about 3 weeks.)         -  ASSESSMENT and PLAN:    - Reviewed labs including A1C at 5.7%    Will recheck A1C      - Need for STI evaluation    Check GC/Chlam    Check HIV and RPR      - Constipation. Declined rectal exam today    Suggested taking Miralax at night with liquids    Maintain high liquid and high fiber diet     Also, use Senna (Docusate) in the morning if no bowel movement      Follow-up in 6 months, sooner if needed    Mt Becker MD  Family Medicine and Sports Medicine  [facility]        SUBJECTIVE:   Javi is here to review his labs.     Also, wants to have STI labs.     Also, having constipation with some blood in stools over the past 3 weeks.   States that it may be stress related as his relationship just ended and father's day is also hard for him.      Review Of Systems:    See subjective.   Has otherwise been in usual state of health, e.g.   Cardiovascular: negative  Respiratory: No shortness of breath, dyspnea on exertion, cough, or hemoptysis  Genitourinary: negative    Problem list per EMR:  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)      Major depression in complete remission (H)       Current Outpatient Medications   Medication Sig Dispense Refill     amLODIPine (NORVASC) 10 MG tablet Take 1 tablet (10 mg) by mouth daily 90 tablet 3     atorvastatin (LIPITOR) 10 MG tablet 1 by mouth daily 90 tablet 3     citalopram (CELEXA) 40 MG tablet Take 1 tablet (40 mg) by mouth every morning 90 tablet 3     hydrochlorothiazide (HYDRODIURIL) 25 MG tablet  "Take 1 tablet (25 mg) by mouth daily 90 tablet 3     naltrexone (DEPADE/REVIA) 50 MG tablet Take 1 tablet (50 mg) by mouth daily 90 tablet 3       Allergies   Allergen Reactions     Dust Mites      Grass      Ragweeds      Codeine Anxiety     \"gets loopy\" - doesn't like how it feels          OBJECTIVE    Vitals: /85 (BP Location: Left arm, Patient Position: Sitting, Cuff Size: Adult Large)   Pulse 81   Temp 98.1  F (36.7  C) (Temporal)   Wt 87.8 kg (193 lb 8 oz)   BMI 26.24 kg/m    BMI= Body mass index is 26.24 kg/m .  Javi appears in his usual state of health.  He has a slightly antalgic gait due to his cerebral palsy.    His neck is supple without lymphadenopathy    His lungs are clear to auscultation throughout    His abdomen is soft and nontender.  No hepatosplenomegaly.    Evaluation of the anus and rectum was deferred by the patient.    SEE TOP OF NOTE FOR ASSESSMENT AND PLAN    --Mt Becker MD  [facility], Department of Family Medicine and Community Health  "

## 2023-06-19 NOTE — PATIENT INSTRUCTIONS
-  ASSESSMENT and PLAN:    - Reviewed labs including A1C at 5.7%  Will recheck A1C      - Need for STI evaluation  Check GC/Chlam  Check HIV and RPR      - Constipation. Declined rectal exam today  Suggested taking Miralax at night with liquids  Maintain high liquid and high fiber diet   Also, use Senna (Docusate) in the morning if no bowel movement      Follow-up in 6 months, sooner if needed    Mt Becker MD  Family Medicine and Sports Medicine  [facility]

## 2023-06-20 LAB
C TRACH DNA SPEC QL NAA+PROBE: NEGATIVE
HIV 1+2 AB+HIV1 P24 AG SERPL QL IA: NONREACTIVE
N GONORRHOEA DNA SPEC QL NAA+PROBE: NEGATIVE
T PALLIDUM AB SER QL: NONREACTIVE

## 2023-07-06 ENCOUNTER — OFFICE VISIT (OUTPATIENT)
Dept: PSYCHOLOGY | Facility: CLINIC | Age: 53
End: 2023-07-06
Payer: COMMERCIAL

## 2023-07-06 DIAGNOSIS — F34.1 DYSTHYMIC DISORDER: Primary | ICD-10-CM

## 2023-07-06 DIAGNOSIS — F91.8 CONDUCT DISORDER, UNDIFFERENTIATED TYPE: ICD-10-CM

## 2023-07-06 PROCEDURE — 90853 GROUP PSYCHOTHERAPY: CPT | Performed by: MARRIAGE & FAMILY THERAPIST

## 2023-07-06 NOTE — GROUP NOTE
[facility]  [address], [address]  [place]  [address]    Group Progress Note  [facility] - Progress Note    Date of Service: 23   Name: Javi Salmeron  : 1970  Medical Record Number: [id number]  START TIME:  4:30 PM  END TIME:  6:30 PM  FACILITATOR(S): Rosalinda Avendano LMFT, [facility]  TOPIC: [facility] Group Therapy  Type of Session: Group  :  Number of Attendees:  7  Number of Minutes:  120    SERVICE MODALITY:  In-person      DSM-5 Diagnoses:  300.4 Dysthymia  312.89 (F91.8) Other Specified Disruptive, Impulse Control, and Conduct Disorder (Hypersexual Disorder)       Current Reported Symptoms and Status update:  Changes since last session-struggling with depression symptoms this month  Depression reasonably well managed. Not having issues with CSB.       Progress Toward Treatment Goals:   Stable/Maintenance of progress     Therapeutic Interventions/Treatment Strategies:    Area(s) of treatment focus addressed in this session included Symptom Management and Maintenance of Progress    Patient checked in about their mental health symptoms, healthy coping skills used over the week, negative coping skills used over the week, what sexual behaviors they engaged in-fantasy, masturbation, sex, their comfort level with their behaviors, if there were triggers, urges to violate boundaries, and violations of boundaries.  They took time to process about the past month they provided support and feedback to others in the group.    Psychotherapist offered support, feedback and validation and reinforced use of skills Treatment modalities used include [facility] THERAPY INTERVENTIONS: Motivational Interviewing Group Dynamic Therapy  Interpersonal Cognitive Restructuring:  Facilitated recognition of the connection between negative thoughts and negative core beliefs and Coping Skills: Facilitated discussion on learning and applying radical acceptance skill and Discussed how the  "use of intentional \"in the moment\" actions can help reduce distress  Support, Feedback and Structured Activity    Patient Response:   Patient responded to session by accepting feedback, giving feedback and listening  Possible barriers to participation / learning include: N/A    Current Mental Status Exam:   Appearance:  Appropriate   Eye Contact:  Good   Attitude / Demeanor: Cooperative   Speech      Rate / Production: Normal/ Responsive Talkative      Volume:  Normal  volume  Orientation:  All  Mood:   Anxious  Depressed   Affect:   Appropriate  Subdued   Thought Content: Clear   Insight:   Good       Plan/Need for Future Services:  Return for therapy in 4 weeks to treat diagnosed problems.    Patient has a current master individualized treatment plan.  See Epic treatment plan for more information.    Referral / Collaboration:  Referral to another professional/service is not indicated at this time..  Emergency Services Needed?  No    Assignment:  Return in 4 weeks    Interactive Complexity:  There are four specific communication difficulties that complicate the work of the primary psychiatric procedure.  Interactive complexity (+34180) may be reported when at least one of these difficulties is present.    Communication difficulties present during current the psychiatric procedure include:  1. None.      Signature/Title:    LARS Zuleta, [facility]      "

## 2023-07-20 ENCOUNTER — VIRTUAL VISIT (OUTPATIENT)
Dept: PSYCHOLOGY | Facility: CLINIC | Age: 53
End: 2023-07-20
Payer: COMMERCIAL

## 2023-07-20 DIAGNOSIS — F34.1 DYSTHYMIC DISORDER: Primary | ICD-10-CM

## 2023-07-20 DIAGNOSIS — F91.8 CONDUCT DISORDER, UNDIFFERENTIATED TYPE: ICD-10-CM

## 2023-07-20 PROCEDURE — 90834 PSYTX W PT 45 MINUTES: CPT | Mod: VID | Performed by: MARRIAGE & FAMILY THERAPIST

## 2023-07-20 NOTE — NURSING NOTE
Is the patient currently in the state of [place]? YES    Visit mode:VIDEO    If the visit is dropped, the patient can be reconnected by: VIDEO VISIT: Text to cell phone:   Telephone Information:   Mobile [phone]       Will anyone else be joining the visit? No  (If patient encounters technical issues they should call [phone])    How would you like to obtain your AVS? MyChart    Are changes needed to the allergy or medication list? N/A    Rooming Documentation: Questionnaire(s) not done per department protocol.    Reason for visit: RECHAPOORVA Boogie

## 2023-07-20 NOTE — PROGRESS NOTES
Virtual Visit Details    Type of service:  Video Visit     Originating Location (pt. Location): Home    Distant Location (provider location):  Off-site  Platform used for Video Visit: [facility] - Progress Note    Date of Service: 23   Name: Javi Salmeron  : 1970  Medical Record Number: [id number]  Treating Provider: LARS Goncalves, NIA  Type of Session: Individual  Present in Session: pt  Session Start and Stop Time: 8802-5404  Number of Minutes:  44    SERVICE MODALITY:  Video Visit:      Provider verified identity through the following two step process.  Patient provided:  Patient is known previously to provider    Telemedicine Visit: The patient's condition can be safely assessed and treated via synchronous audio and visual telemedicine encounter.      Reason for Telemedicine Visit: Services only offered telehealth    Originating Site (Patient Location): Patient's home    Distant Site (Provider Location): [facility]    Consent:  The patient/guardian has verbally consented to: the potential risks and benefits of telemedicine (video visit) versus in person care; bill my insurance or make self-payment for services provided; and responsibility for payment of non-covered services.     Patient would like the video invitation sent by:  My Chart    Mode of Communication:  Video Conference via [facility]    Distant Location (Provider):  Off-site    As the provider I attest to compliance with applicable laws and regulations related to telemedicine.    DSM-5 Diagnoses:  300.4 Dysthymia  312.89 (F91.8) Other Specified Disruptive, Impulse Control, and Conduct Disorder (Hypersexual Disorder)       Current Reported Symptoms and Status update:  Changes since last session-finally blocked chelly on fb  Depression reasonably well managed. Not having issues with CSB.       Progress Toward Treatment Goals:   Stable/Maintenance of progress      Therapeutic Interventions/Treatment Strategies:    Area(s) of treatment focus addressed in this session included Symptom Management, Interpersonal Relationship Skills and Maintenance of Progress      Psychotherapist offered support, feedback and validation and reinforced use of skills Treatment modalities used include Motivational Interviewing Solution Focused Therapy Interpersonal Relationship Skills: Discussed strategies to promote healthier understanding of interpersonal relationships and explored challenging unrealistic expectations of self/others  Support, Feedback, Structured Activity and Problem Solving    Patient Response:   Patient responded to session by accepting feedback, listening and accepting support  Possible barriers to participation / learning include: N/A    Current Mental Status Exam:   Appearance:  Appropriate   Eye Contact:  Good   Attitude / Demeanor: Cooperative   Speech      Rate / Production: Normal/ Responsive      Volume:  Normal  volume  Orientation:  All  Mood:   Depressed   Affect:   Appropriate   Thought Content: Clear   Insight:   Good       Plan/Need for Future Services:  Return for therapy in 4 weeks to treat diagnosed problems.    Patient has a current master individualized treatment plan.  See Epic treatment plan for more information.    Referral / Collaboration:  Referral to another professional/service is not indicated at this time..  Emergency Services Needed?  No    Assignment:  Meet up [facility]    Interactive Complexity:  There are four specific communication difficulties that complicate the work of the primary psychiatric procedure.  Interactive complexity (+88644) may be reported when at least one of these difficulties is present.    Communication difficulties present during current the psychiatric procedure include:  1. None.      Signature/Title:    Rosalinda Avendano, LMFT, [facility]

## 2023-08-03 ENCOUNTER — OFFICE VISIT (OUTPATIENT)
Dept: PSYCHOLOGY | Facility: CLINIC | Age: 53
End: 2023-08-03
Payer: COMMERCIAL

## 2023-08-03 DIAGNOSIS — F34.1 DYSTHYMIC DISORDER: Primary | ICD-10-CM

## 2023-08-03 DIAGNOSIS — F91.8 CONDUCT DISORDER, UNDIFFERENTIATED TYPE: ICD-10-CM

## 2023-08-03 PROCEDURE — 90853 GROUP PSYCHOTHERAPY: CPT | Performed by: MARRIAGE & FAMILY THERAPIST

## 2023-08-03 NOTE — GROUP NOTE
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 Not having issues with CSB.     Progress Toward Treatment Goals:   Stable/Maintenance of progress     Therapeutic Interventions/Treatment Strategies:    Area(s) of treatment focus addressed in this session included Symptom Management, Interpersonal Relationship Skills, and Maintenance of Progress    Patient checked in about their mental health symptoms, healthy coping skills used over the week, negative coping skills used over the week, what sexual behaviors they engaged in-fantasy, masturbation, sex, their comfort level with their behaviors, if there were triggers, urges to violate boundaries, and violations of boundaries.  They took time to process about the past month and working on self care they provided support and feedback to others in the group.   Psychotherapist offered support, feedback and validation Coping Skills: Assisted patient in understanding the purpose of planning / creating / participating / sharing in positive experiences  Support, Feedback, and Safety Assessments    Patient Response:   Patient responded to session by accepting feedback and giving feedback  Possible barriers to participation / learning include: N/A    Current  Mental Status Exam:   Appearance:  Appropriate   Eye Contact:  Good   Attitude / Demeanor: Cooperative   Speech      Rate / Production: Normal/ Responsive      Volume:  Normal  volume  Orientation:  All  Mood:   Anxious   Affect:   Appropriate   Thought Content: Clear   Insight:   Good       Plan/Need for Future Services:  Return for therapy in 8 weeks to treat diagnosed problems.    Patient has a current master individualized treatment plan.  See Epic treatment plan for more information.    Referral / Collaboration:  Referral to another professional/service is not indicated at this time..  Emergency Services Needed?  No    Assignment:  Return to group and individual     Interactive Complexity:  There are four specific communication difficulties that complicate the work of the primary psychiatric procedure.  Interactive complexity (+85593) may be reported when at least one of these difficulties is present.    Communication difficulties present during current the psychiatric procedure include:  None.      Signature/Title:    LARS Zuleta, [facility]

## 2023-08-04 ENCOUNTER — VIRTUAL VISIT (OUTPATIENT)
Dept: PSYCHOLOGY | Facility: CLINIC | Age: 53
End: 2023-08-04
Payer: COMMERCIAL

## 2023-08-04 DIAGNOSIS — F91.8 CONDUCT DISORDER, UNDIFFERENTIATED TYPE: ICD-10-CM

## 2023-08-04 DIAGNOSIS — F34.1 DYSTHYMIC DISORDER: Primary | ICD-10-CM

## 2023-08-04 PROCEDURE — 90832 PSYTX W PT 30 MINUTES: CPT | Mod: VID | Performed by: MARRIAGE & FAMILY THERAPIST

## 2023-08-04 NOTE — PROGRESS NOTES
[facility] - Progress Note    Date of Service: 23   Name: Javi Salmeron  : 1970  Medical Record Number: [id number]  Treating Provider: LARS Goncalves LADC   Type of Session: Individual  Present in Session: pt  Session Start and Stop Time: 6692-7659  Number of Minutes:  31    SERVICE MODALITY:  Video Visit:      Provider verified identity through the following two step process.  Patient provided:  Patient is known previously to provider    Telemedicine Visit: The patient's condition can be safely assessed and treated via synchronous audio and visual telemedicine encounter.      Reason for Telemedicine Visit: Services only offered telehealth    Originating Site (Patient Location): Patient's home    Distant Site (Provider Location): [facility]    Consent:  The patient/guardian has verbally consented to: the potential risks and benefits of telemedicine (video visit) versus in person care; bill my insurance or make self-payment for services provided; and responsibility for payment of non-covered services.     Patient would like the video invitation sent by:  My Chart    Mode of Communication:  Video Conference via [facility]    Distant Location (Provider):  On-site    As the provider I attest to compliance with applicable laws and regulations related to telemedicine.    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 Not having issues with CSB.     Progress Toward Treatment Goals:   Stable/Maintenance of progress     Therapeutic Interventions/Treatment Strategies:    Area(s) of treatment focus addressed in this session included Symptom Management and Maintenance of Progress    Psychotherapist offered support,  Returning call. Patient is working and cannot always answer the phone. Please leave a detailed message if she cannot answer.    feedback and validation and reinforced use of skills Treatment modalities used include Motivational Interviewing Cognitive Behavioral Therapy Interpersonal Behavioral Activation: Explored how behaviors effect mood and interact with thoughts and feelings, Coping Skills: Assisted patient in understanding the purpose of planning / creating / participating / sharing in positive experiences, and discussed application of self compassion  Support, Feedback, and Problem Solving    Patient Response:   Patient responded to session by accepting feedback, listening, focusing on goals, and accepting support  Possible barriers to participation / learning include: N/A    Current Mental Status Exam:   Appearance:  Appropriate   Eye Contact:  Good   Attitude / Demeanor: Cooperative   Speech      Rate / Production: Normal/ Responsive      Volume:  Normal  volume  Orientation:  All  Mood:   Depressed  Normal  Affect:   Appropriate   Thought Content: Clear   Insight:   Good       Plan/Need for Future Services:  Return for therapy in 4 weeks to treat diagnosed problems.    Patient has a current master individualized treatment plan.  See Epic treatment plan for more information.    Referral / Collaboration:  Referral to another professional/service is not indicated at this time..  Emergency Services Needed?  No    Assignment:  Retrurn in 4 weeks  [place] classes    Interactive Complexity:  There are four specific communication difficulties that complicate the work of the primary psychiatric procedure.  Interactive complexity (+55601) may be reported when at least one of these difficulties is present.    Communication difficulties present during current the psychiatric procedure include:  None.      Signature/Title:    LARS Goncalves, [facility]     Virtual Visit Details    Type of service:  Video Visit     Originating Location (pt. Location): Home    Distant Location (provider location):  On-site  Platform used for Video Visit:  AmWell

## 2023-08-04 NOTE — NURSING NOTE
Is the patient currently in the state of [place]? YES    Visit mode:VIDEO    If the visit is dropped, the patient can be reconnected by: VIDEO VISIT: Text to cell phone:   Telephone Information:   Mobile [phone]       Will anyone else be joining the visit? No  (If patient encounters technical issues they should call [phone])    How would you like to obtain your AVS? MyChart    Are changes needed to the allergy or medication list? N/A    Rooming Documentation: Questionnaire(s) not done per department protocol.    Reason for visit: APOORVA Salazar

## 2023-10-05 ENCOUNTER — OFFICE VISIT (OUTPATIENT)
Dept: PSYCHOLOGY | Facility: CLINIC | Age: 53
End: 2023-10-05
Payer: COMMERCIAL

## 2023-10-05 DIAGNOSIS — F91.8 CONDUCT DISORDER, UNDIFFERENTIATED TYPE: ICD-10-CM

## 2023-10-05 DIAGNOSIS — F34.1 DYSTHYMIC DISORDER: Primary | ICD-10-CM

## 2023-10-05 PROCEDURE — 90853 GROUP PSYCHOTHERAPY: CPT | Performed by: MARRIAGE & FAMILY THERAPIST

## 2023-10-05 NOTE — GROUP NOTE
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 Not having issues with CSB.     Progress Toward Treatment Goals:   Stable/Maintenance of progress     Therapeutic Interventions/Treatment Strategies:    Area(s) of treatment focus addressed in this session included Symptom Management and Maintenance of Progress    Patient checked in about their mental health symptoms, healthy coping skills used over the week, negative coping skills used over the week, what sexual behaviors they engaged in-fantasy, masturbation, sex, their comfort level with their behaviors, if there were triggers, urges to violate boundaries, and violations of boundaries.  They took time to process about updates and positive events improved mood they provided support and feedback to others in the group.    Psychotherapist offered support, feedback and validation and reinforced use of skills Treatment modalities used include [facility] THERAPY INTERVENTIONS: Group Dynamic Therapy  Interpersonal Coping Skills: Assisted patient in understanding the purpose of planning / creating / participating / sharing in positive experiences  Support, Feedback, and Structured Activity    Patient Response:   Patient responded to session by accepting  feedback, giving feedback, and listening  Possible barriers to participation / learning include: N/A    Current Mental Status Exam:   Appearance:  Appropriate   Eye Contact:  Good   Attitude / Demeanor: Cooperative  Interested  Speech      Rate / Production: Normal/ Responsive      Volume:  Normal  volume  Orientation:  All  Mood:   Normal Euthymic  Affect:   Appropriate   Thought Content: Clear   Insight:   Good       Plan/Need for Future Services:  Return for therapy in 4 weeks to treat diagnosed problems.    Patient has a current master individualized treatment plan.  See Epic treatment plan for more information.    Referral / Collaboration:  Referral to another professional/service is not indicated at this time..  Emergency Services Needed?  No    Assignment:  Return in 4 weeks     Interactive Complexity:  There are four specific communication difficulties that complicate the work of the primary psychiatric procedure.  Interactive complexity (+47818) may be reported when at least one of these difficulties is present.    Communication difficulties present during current the psychiatric procedure include:  None.      Signature/Title:    Doe Avendano, LMFT, [facility]

## 2023-10-16 ENCOUNTER — VIRTUAL VISIT (OUTPATIENT)
Dept: PSYCHOLOGY | Facility: CLINIC | Age: 53
End: 2023-10-16
Payer: COMMERCIAL

## 2023-10-16 DIAGNOSIS — F34.1 DYSTHYMIC DISORDER: Primary | ICD-10-CM

## 2023-10-16 DIAGNOSIS — F91.8 CONDUCT DISORDER, UNDIFFERENTIATED TYPE: ICD-10-CM

## 2023-10-16 PROCEDURE — 90834 PSYTX W PT 45 MINUTES: CPT | Mod: VID | Performed by: MARRIAGE & FAMILY THERAPIST

## 2023-10-16 NOTE — PROGRESS NOTES
Virtual Visit Details    Type of service:  Video Visit     Originating Location (pt. Location): Home    Distant Location (provider location):  Off-site  Platform used for Video Visit: [facility] - Progress Note    Date of Service: 10/16/23   Name: Javi Salmeron  : 1970  Medical Record Number: [id number]  Treating Provider: LARS Goncalves, NIA   Type of Session: Individual  Present in Session: pt  Session Start and Stop Time: 0262-4251  Number of Minutes:  50    SERVICE MODALITY:  Video Visit:      Provider verified identity through the following two step process.  Patient provided:  Patient is known previously to provider    Telemedicine Visit: The patient's condition can be safely assessed and treated via synchronous audio and visual telemedicine encounter.      Reason for Telemedicine Visit: Services only offered telehealth    Originating Site (Patient Location): Patient's home    Distant Site (Provider Location): [facility]    Consent:  The patient/guardian has verbally consented to: the potential risks and benefits of telemedicine (video visit) versus in person care; bill my insurance or make self-payment for services provided; and responsibility for payment of non-covered services.     Patient would like the video invitation sent by:  My Chart    Mode of Communication:  Video Conference via [facility]    Distant Location (Provider):  Off-site    As the provider I attest to compliance with applicable laws and regulations related to telemedicine.    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 Not having issues with CSB.     Progress Toward Treatment Goals:   Satisfactory progress    Stable/Maintenance of progress     Therapeutic Interventions/Treatment Strategies:    Area(s) of treatment focus addressed in this session included Symptom Management, Interpersonal Relationship Skills, and Maintenance of Progress    Psychotherapist offered support, feedback and validation and reinforced use of skills Treatment modalities used include Motivational Interviewing Solution Focused Therapy Interpersonal Relationship Skills: Discussed strategies to promote healthier understanding of interpersonal relationships and discussed application of self compassion, explored challenging unrealistic expectations of self/others, and explored comfort with sexual communication  Support, Feedback, and Structured Activity    Patient Response:   Patient responded to session by accepting feedback, listening, and accepting support  Possible barriers to participation / learning include: N/A    Current Mental Status Exam:   Appearance:  Appropriate   Eye Contact:  Good   Attitude / Demeanor: Cooperative   Speech      Rate / Production: Normal/ Responsive      Volume:  Normal  volume  Orientation:  All  Mood:   Anxious  Normal  Affect:   Appropriate   Thought Content: Clear   Insight:   Good       Plan/Need for Future Services:  Return for therapy in 4 weeks to treat diagnosed problems.    Patient has a current master individualized treatment plan.  See Epic treatment plan for more information.    Referral / Collaboration:  Referral to another professional/service is not indicated at this time..  Emergency Services Needed?  No    Assignment:  Return in one month    Interactive Complexity:  There are four specific communication difficulties that complicate the work of the primary psychiatric procedure.  Interactive complexity (+04403) may be reported when at least one of these difficulties is present.    Communication difficulties present during current the psychiatric procedure include:  none      Signature/Title:    Rosalinda  JULIO Avendano, LARS, [facility]

## 2023-10-16 NOTE — NURSING NOTE
Is the patient currently in the state of [place]? YES    Visit mode:VIDEO    If the visit is dropped, the patient can be reconnected by: VIDEO VISIT: Text to cell phone:   Telephone Information:   Mobile [phone]       Will anyone else be joining the visit? No  (If patient encounters technical issues they should call [phone])    How would you like to obtain your AVS? MyChart    Are changes needed to the allergy or medication list? N/A    Rooming Documentation: Questionnaire(s) not done per department protocol.    Reason for visit: RECHAPOORVA Boogie

## 2023-11-08 ENCOUNTER — OFFICE VISIT (OUTPATIENT)
Dept: PSYCHOLOGY | Facility: CLINIC | Age: 53
End: 2023-11-08
Payer: COMMERCIAL

## 2023-11-08 DIAGNOSIS — F91.8 CONDUCT DISORDER, UNDIFFERENTIATED TYPE: ICD-10-CM

## 2023-11-08 DIAGNOSIS — F34.1 DYSTHYMIC DISORDER: Primary | ICD-10-CM

## 2023-11-08 PROCEDURE — 90853 GROUP PSYCHOTHERAPY: CPT | Performed by: MARRIAGE & FAMILY THERAPIST

## 2023-11-08 NOTE — GROUP NOTE
[facility]  [address], [address]  [place]  [address]    Group Progress Note  [facility] - Progress Note    Date of Service: 23   Name: Javi Salmeron  : 1970  Medical Record Number: [id number]  START TIME:  4:30 PM  END TIME:  6:30 PM  FACILITATOR(S): Rosalinda Avendano LMFT, NIA  TOPIC: [facility] Group Therapy  Type of Session: Group  :  Number of Attendees:  6  Number of Minutes:  /120    SERVICE MODALITY:  In-person      DSM-5 Diagnoses:  Depression reasonably well managed. Not having issues with CSB.     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 Not having issues with CSB.     Progress Toward Treatment Goals:   Stable/Maintenance of progress     Therapeutic Interventions/Treatment Strategies:    Area(s) of treatment focus addressed in this session included Symptom Management and Maintenance of Progress    Patient checked in about their mental health symptoms, healthy coping skills used over the week, negative coping skills used over the week, what sexual behaviors they engaged in-fantasy, masturbation, sex, their comfort level with their behaviors, if there were triggers, urges to violate boundaries, and violations of boundaries.  They took time to process about past month and relationship dating they provided support and feedback to others in the group.   Psychotherapist offered support, feedback and validation and reinforced use of skills Treatment modalities used include [facility] THERAPY INTERVENTIONS: Motivational Interviewing Group Dynamic Therapy  Interpersonal Relationship Skills: Discussed strategies to promote healthier understanding of interpersonal relationships  Support, Feedback, and Structured Activity    Patient Response:   Patient responded to  session by accepting feedback, giving feedback, and listening  Possible barriers to participation / learning include: N/A    Current Mental Status Exam:   Appearance:  Appropriate   Eye Contact:  Good   Attitude / Demeanor: Cooperative   Speech      Rate / Production: Normal/ Responsive      Volume:  Normal  volume  Orientation:  All  Mood:   Euthymic  Affect:   Appropriate   Thought Content: Clear   Insight:   Good       Plan/Need for Future Services:  Return for therapy in 4 weeks to treat diagnosed problems.    Patient has a current master individualized treatment plan.  See Epic treatment plan for more information.    Referral / Collaboration:  Referral to another professional/service is not indicated at this time..  Emergency Services Needed?  No    Assignment:  Return in 4 weeks     Interactive Complexity:  There are four specific communication difficulties that complicate the work of the primary psychiatric procedure.  Interactive complexity (+24107) may be reported when at least one of these difficulties is present.    Communication difficulties present during current the psychiatric procedure include:  None.      Signature/Title:    Doe Avendano, LMFT, [facility]

## 2023-12-14 NOTE — PROGRESS NOTES
[facility]  Case Progress Note    5/13/19    Client Name: Javi Salmeron  YOB: 1970  MRN:  [id number]  Treating Provider: Connie Baugh, PhD  Type of Session: Aftercare  Number of Minutes: 120    Current Symptoms/Status:  Feeling good about his progress.    Progress Toward Treatment Goals:   Using support system.       Intervention: Modality and Description:  Aftercare support group    Response to Intervention:  Had some neg feelings about his ex-wife - over the issues with his kids - and wants to address this with her.  Had some stress at work.  Decided to talk to manager about the issue rather than sitting on it.  He has not been able to do this yet but plans to do that.    Assignment:  Stay within boundaries.       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  Monthly aftercare support group.        No

## 2023-12-21 DIAGNOSIS — I10 ESSENTIAL HYPERTENSION, BENIGN: ICD-10-CM

## 2023-12-21 RX ORDER — AMLODIPINE BESYLATE 10 MG/1
10 TABLET ORAL DAILY
Qty: 90 TABLET | Refills: 1 | Status: SHIPPED | OUTPATIENT
Start: 2023-12-21 | End: 2024-05-16

## 2023-12-21 NOTE — TELEPHONE ENCOUNTER
Medication requested: amLODIPine (NORVASC) 10 MG tablet   Last office visit: 6/19/23  [facility] appointments: none  Medication last refilled: 3/13/23; 90 + 3 refills  Last qualifying labs:   BP Readings from Last 3 Encounters:   06/19/23 126/85   03/13/23 130/81   06/17/22 123/73     Component      Latest Ref Rng 3/13/2023  8:30 AM   Creatinine      0.67 - 1.17 mg/dL 1.05      Prescription approved per [facility] Refill Protocol.    Lauro PIERCE, RN  12/21/23 3:11 PM

## 2023-12-28 ENCOUNTER — TELEPHONE (OUTPATIENT)
Dept: FAMILY MEDICINE | Facility: CLINIC | Age: 53
End: 2023-12-28

## 2024-01-03 ENCOUNTER — OFFICE VISIT (OUTPATIENT)
Dept: FAMILY MEDICINE | Facility: CLINIC | Age: 54
End: 2024-01-03
Payer: COMMERCIAL

## 2024-01-03 VITALS
OXYGEN SATURATION: 96 % | RESPIRATION RATE: 17 BRPM | SYSTOLIC BLOOD PRESSURE: 145 MMHG | HEART RATE: 98 BPM | BODY MASS INDEX: 26.31 KG/M2 | TEMPERATURE: 97.6 F | DIASTOLIC BLOOD PRESSURE: 91 MMHG | WEIGHT: 194 LBS

## 2024-01-03 DIAGNOSIS — R05.9 COUGH, UNSPECIFIED TYPE: Primary | ICD-10-CM

## 2024-01-03 DIAGNOSIS — J18.9 PNEUMONIA OF BOTH LUNGS DUE TO INFECTIOUS ORGANISM, UNSPECIFIED PART OF LUNG: ICD-10-CM

## 2024-01-03 LAB
FLUAV AG SPEC QL IA: NEGATIVE
FLUAV RNA SPEC QL NAA+PROBE: NEGATIVE
FLUBV AG SPEC QL IA: NEGATIVE
FLUBV RNA RESP QL NAA+PROBE: NEGATIVE
RSV RNA SPEC NAA+PROBE: NEGATIVE
SARS-COV-2 RNA RESP QL NAA+PROBE: NEGATIVE

## 2024-01-03 PROCEDURE — 87637 SARSCOV2&INF A&B&RSV AMP PRB: CPT | Performed by: FAMILY MEDICINE

## 2024-01-03 RX ORDER — AZITHROMYCIN 250 MG/1
TABLET, FILM COATED ORAL
Qty: 6 TABLET | Refills: 0 | Status: SHIPPED | OUTPATIENT
Start: 2024-01-03 | End: 2024-01-08

## 2024-01-03 NOTE — PATIENT INSTRUCTIONS
-  ASSESSMENT and PLAN:     Javi is a 54 yo with 2 weeks of a cough that is not improving. Difficulty sleeping due to it.   Will treat as for mild community acquired pneumnia   Azithromax - Z- pack  If no improvement, consider adding Amoxicillin  Await viral tests  Lots of fluids and symptomatic cares. Avoiding eating before bedtime        Follow-up as needed    Mt Becker MD  Family Medicine and Sports Medicine  [facility]

## 2024-01-03 NOTE — PROGRESS NOTES
-  ASSESSMENT and PLAN:     Javi is a 54 yo with 2 weeks of a cough that is not improving. Difficulty sleeping due to it.   Will treat as for mild community acquired pneumnia   Azithromax - Z- pack  If no improvement, consider adding Amoxicillin  Await viral tests  Lots of fluids and symptomatic cares. Avoiding eating before bedtime        Follow-up as needed    Mt Becker MD  Family Medicine and Sports Medicine  [facility]      Medical assistant intake:  Javi Salmeron is a 53 year old male who presents to [facility] today for URI (cough x 2 weeks. Keeping him up at night. Phlegm -- brownish, sometimes blood. Diarrhea. Having a hard time with food. No fevers measured. Negative at home COVID testing.)      SUBJECTIVE:   Cough for 2 weeks, brownish phlegm.   Then, diarrhea started 4 days. The diarrhea is starting to improve. Had a somewhat formed stool this morning.   Home covid tests have been negative. No fevers.     No known close contacts who are ill.       Review Of Systems:    See subjective.   Has otherwise been in usual state of health, e.g.   Cardiovascular: negative  Genitourinary: negative    Problem list per EMR:  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)     Major depression in complete remission (H)       Current Outpatient Medications   Medication Sig Dispense Refill    amLODIPine (NORVASC) 10 MG tablet TAKE 1 TABLET(10 MG) BY MOUTH DAILY 90 tablet 1    atorvastatin (LIPITOR) 10 MG tablet 1 by mouth daily 90 tablet 3    citalopram (CELEXA) 40 MG tablet Take 1 tablet (40 mg) by mouth every morning 90 tablet 3    hydrochlorothiazide (HYDRODIURIL) 25 MG tablet Take 1 tablet (25 mg) by mouth daily 90 tablet 3    naltrexone (DEPADE/REVIA) 50 MG tablet Take 1 tablet (50 mg) by  "mouth daily 90 tablet 3       Allergies   Allergen Reactions    Dust Mites     Grass     Ragweeds     Codeine Anxiety     \"gets loopy\" - doesn't like how it feels        Social:   Unchanged    OBJECTIVE    Vitals: BP (!) 145/91 (BP Location: Left arm, Patient Position: Sitting, Cuff Size: Adult Large)   Pulse 98   Temp 97.6  F (36.4  C) (Temporal)   Resp 17   Wt 88 kg (194 lb)   SpO2 96%   BMI 26.31 kg/m    BMI= Body mass index is 26.31 kg/m .    Javi appears in his usual state of health.  He has a somewhat barky cough throughout the examination.  His lungs sound a bit wheezy when he is coughing but then they clear in between coughs.  He is not short of breath.  His respiration rate is approximately 16 and unlabored.  Neck is supple and without lymphadenopathy.  Cardiovascular-regular rate and rhythm without murmur.  Again, the lungs are clear except with some wheezing right around his coughs.    SEE TOP OF NOTE FOR ASSESSMENT AND PLAN    --Mt Becker MD  [facility], Department of Family Medicine and Community Health  "

## 2024-01-05 ENCOUNTER — TELEPHONE (OUTPATIENT)
Dept: FAMILY MEDICINE | Facility: CLINIC | Age: 54
End: 2024-01-05

## 2024-01-05 DIAGNOSIS — J18.9 PNEUMONIA OF BOTH LUNGS DUE TO INFECTIOUS ORGANISM, UNSPECIFIED PART OF LUNG: Primary | ICD-10-CM

## 2024-01-05 RX ORDER — AMOXICILLIN 500 MG/1
1000 CAPSULE ORAL 3 TIMES DAILY
Qty: 42 CAPSULE | Refills: 0 | Status: SHIPPED | OUTPATIENT
Start: 2024-01-05 | End: 2024-01-12

## 2024-01-05 RX ORDER — BENZONATATE 200 MG/1
200 CAPSULE ORAL 3 TIMES DAILY PRN
Qty: 30 CAPSULE | Refills: 0 | Status: SHIPPED | OUTPATIENT
Start: 2024-01-05 | End: 2024-04-05

## 2024-01-05 NOTE — TELEPHONE ENCOUNTER
Who is calling? Patient  Reason for Call:States his PCP will prescribe a stronger medication to help with his consistent cough.

## 2024-01-05 NOTE — TELEPHONE ENCOUNTER
Per Dr. Becker, will send order for Tessalon perles and amoxicillin 1 g TID x 7 days for ongoing treatment of community acquired pneumonia.  Reviewed possible s/e, encouraged increased consumption of dietary probiotics to potentially reduce s/e burden. Pt confirmed understanding and will call clinic next week if not feeling better.    Lauro PIERCE, RN  01/05/24 2:27 PM

## 2024-01-16 ENCOUNTER — VIRTUAL VISIT (OUTPATIENT)
Dept: PSYCHOLOGY | Facility: CLINIC | Age: 54
End: 2024-01-16
Payer: COMMERCIAL

## 2024-01-16 DIAGNOSIS — F91.8 CONDUCT DISORDER, UNDIFFERENTIATED TYPE: ICD-10-CM

## 2024-01-16 DIAGNOSIS — F34.1 DYSTHYMIC DISORDER: Primary | ICD-10-CM

## 2024-01-16 PROCEDURE — 90834 PSYTX W PT 45 MINUTES: CPT | Mod: 95 | Performed by: MARRIAGE & FAMILY THERAPIST

## 2024-01-16 NOTE — NURSING NOTE
Is the patient currently in the state of [place]? YES    Visit mode:VIDEO    If the visit is dropped, the patient can be reconnected by: VIDEO VISIT: Text to cell phone:   Telephone Information:   Mobile [phone]       Will anyone else be joining the visit? NO  (If patient encounters technical issues they should call [phone]    How would you like to obtain your AVS? MyChart    Are changes needed to the allergy or medication list? No    Reason for visit: RECHECK    Jono GUERIN

## 2024-01-16 NOTE — PROGRESS NOTES
[facility] - Progress Note    Date of Service: 24   Name: Javi Salmeron  : 1970  Medical Record Number: [id number]  Treating Provider: LARS Goncalves LADC  Type of Session: {[facility] Session Type:50799527}  Present in Session: ***  Session Start and Stop Time: ***-***  Number of Minutes:  ***    SERVICE MODALITY:  {Service Modality:067127}    DSM-5 Diagnoses:  ***    Current Reported Symptoms and Status update:  Changes since last session***    Progress Toward Treatment Goals:   {Progress Described:028769}    Therapeutic Interventions/Treatment Strategies:    Area(s) of treatment focus addressed in this session included { AREA OF TREATMENT FOCUS:114751}    ***    {[facility] progress note modalities:526792}  {OP BEH ADULT  THERAPEUTIC INTERVENTIONS & TX STRAT:316948}    Patient Response:   Patient responded to session by {PATIENT RESPONSE:276386}  Possible barriers to participation / learning include: {POSSIBLE BARRIERS:387285}    Current Mental Status Exam:   Appearance:  {Appearance:228111}  Eye Contact:  {Eye Contact:168582}  Attitude / Demeanor: {Attitude:680422}  Speech      Rate / Production: {Rate/Production:330114}      Volume:  {Volume:459520} volume  Orientation:  {Orientation:990083}  Mood:   {Mood:471957}  Affect:   {Affect:917435}  Thought Content: {Thought Content:144313}  Insight:   {Insight:829053}      Plan/Need for Future Services:  Return for therapy in *** weeks to treat diagnosed problems.    Patient has {OP MH PROGRAMMATIC TX PLAN STATUS:919725}    Referral / Collaboration:  {Referral to Professional:019965}.  Emergency Services Needed?  {52364 (16-60 min) / 20718 (30 min add-on; stackable if needed):361125}    Assignment:  ***    Interactive Complexity:  There are four specific communication difficulties that complicate the work of the primary psychiatric procedure.  Interactive complexity (+91551) may be reported when at least one of these difficulties is  "present.    Communication difficulties present during current the psychiatric procedure include:  {[facility] INTERACTIVE COMPLEXITY:51208443}      Signature/Title:    Rosalinda Avendano, LMFT, [facility]         Virtual Visit Details    Type of service:  Video Visit     Originating Location (pt. Location): {video visit patient location:612354::\"Home\"}  {PROVIDER LOCATION On-site should be selected for visits conducted from your clinic location or adjoining [facility] hospital, academic office, or other nearby [facility] building. Off-site should be selected for all other provider locations, including home:631220}  Distant Location (provider location):  {virtual location provider:734361}  Platform used for Video Visit: {Virtual Visit Platforms:851237::\"[employer]\"}  "
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
## 2024-02-01 ENCOUNTER — OFFICE VISIT (OUTPATIENT)
Dept: PSYCHOLOGY | Facility: CLINIC | Age: 54
End: 2024-02-01
Payer: COMMERCIAL

## 2024-02-01 DIAGNOSIS — F91.8 CONDUCT DISORDER, UNDIFFERENTIATED TYPE: ICD-10-CM

## 2024-02-01 DIAGNOSIS — F34.1 DYSTHYMIC DISORDER: Primary | ICD-10-CM

## 2024-02-01 PROCEDURE — 90853 GROUP PSYCHOTHERAPY: CPT | Performed by: MARRIAGE & FAMILY THERAPIST

## 2024-02-01 NOTE — GROUP NOTE
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 Not having issues with CSB.     Progress Toward Treatment Goals:   Stable/Maintenance of progress     Therapeutic Interventions/Treatment Strategies:    Area(s) of treatment focus addressed in this session included Symptom Management and Maintenance of Progress    Patient checked in about their mental health symptoms, healthy coping skills used over the week, negative coping skills used over the week, what sexual behaviors they engaged in-fantasy, masturbation, sex, their comfort level with their behaviors, if there were triggers, urges to violate boundaries, and violations of boundaries.  They took time to process about the past month and managing stres they provided support and feedback to others in the group.    Psychotherapist offered support, feedback and validation and reinforced use of skills Treatment modalities used include [facility] THERAPY INTERVENTIONS: Group Dynamic Therapy  Interpersonal Coping Skills: Facilitated understanding of  what factors may contribute to symptom relapse and skills plan to manage symptom relapse  and Addressed barriers to utilizing coping skills when in distress and Relationship Skills: Discussed strategies to promote healthier  understanding of interpersonal relationships  Support, Feedback, and Structured Activity    Patient Response:   Patient responded to session by accepting feedback and listening  Possible barriers to participation / learning include: N/A    Current Mental Status Exam:   Appearance:  Appropriate   Eye Contact:  Good   Attitude / Demeanor: Cooperative   Speech      Rate / Production: Normal/ Responsive      Volume:  Normal  volume  Orientation:  All  Mood:   Anxious  Normal  Affect:   Appropriate   Thought Content: Clear   Insight:   Good       Plan/Need for Future Services:  Return for therapy in 4 weeks to treat diagnosed problems.    Patient has a current master individualized treatment plan.  See Epic treatment plan for more information.    Referral / Collaboration:  Referral to another professional/service is not indicated at this time..  Emergency Services Needed?  No    Assignment:  Return in one month    Interactive Complexity:  There are four specific communication difficulties that complicate the work of the primary psychiatric procedure.  Interactive complexity (+57779) may be reported when at least one of these difficulties is present.    Communication difficulties present during current the psychiatric procedure include:  None.      Signature/Title:    Doe Avendano, LMFT, [facility]

## 2024-02-11 ENCOUNTER — HEALTH MAINTENANCE LETTER (OUTPATIENT)
Age: 54
End: 2024-02-11

## 2024-02-21 ENCOUNTER — VIRTUAL VISIT (OUTPATIENT)
Dept: PSYCHOLOGY | Facility: CLINIC | Age: 54
End: 2024-02-21
Payer: COMMERCIAL

## 2024-02-21 DIAGNOSIS — F34.1 DYSTHYMIC DISORDER: Primary | ICD-10-CM

## 2024-02-21 DIAGNOSIS — F91.8 CONDUCT DISORDER, UNDIFFERENTIATED TYPE: ICD-10-CM

## 2024-02-21 PROCEDURE — 90834 PSYTX W PT 45 MINUTES: CPT | Mod: 95 | Performed by: MARRIAGE & FAMILY THERAPIST

## 2024-02-21 NOTE — NURSING NOTE
Is the patient currently in the state of [place]? YES    Visit mode:VIDEO    If the visit is dropped, the patient can be reconnected by: VIDEO VISIT: Text to cell phone:   Telephone Information:   Mobile [phone]       Will anyone else be joining the visit? NO  (If patient encounters technical issues they should call [phone])    How would you like to obtain your AVS? MyChart    Are changes needed to the allergy or medication list? No    Reason for visit: RECHECK    Jono GUERIN

## 2024-02-21 NOTE — PROGRESS NOTES
Virtual Visit Details    Type of service:  Video Visit     Originating Location (pt. Location): Home    Distant Location (provider location):  Off-site  Platform used for Video Visit: [facility] - Progress Note    Date of Service: 24   Name: Javi Salmeron  : 1970  Medical Record Number: [id number]  Treating Provider: LARS Goncalves, NIA  Type of Session: Individual  Present in Session: pt  Session Start and Stop Time: 300pm-351pm  Number of Minutes:  51    SERVICE MODALITY:  Video Visit:      Provider verified identity through the following two step process.  Patient provided:  Patient is known previously to provider    Telemedicine Visit: The patient's condition can be safely assessed and treated via synchronous audio and visual telemedicine encounter.      Reason for Telemedicine Visit: Services only offered telehealth    Originating Site (Patient Location): Patient's home    Distant Site (Provider Location): [facility]    Consent:  The patient/guardian has verbally consented to: the potential risks and benefits of telemedicine (video visit) versus in person care; bill my insurance or make self-payment for services provided; and responsibility for payment of non-covered services.     Patient would like the video invitation sent by:  My Chart    Mode of Communication:  Video Conference via [facility]    Distant Location (Provider):  Off-site    As the provider I attest to compliance with applicable laws and regulations related to telemedicine.    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 Not having issues with CSB.     Progress Toward Treatment Goals:   Satisfactory progress    Stable/Maintenance of progress     Therapeutic Interventions/Treatment Strategies:    Area(s) of treatment focus addressed in this session included Symptom Management, Interpersonal Relationship Skills, and Sexual Health and Wellness    Cintia reached out again-told him he  in her dream-then she didn't want to see him just called to tell him about her dream  Went on 4 dates      Psychotherapist offered support, feedback and validation and reinforced use of skills Treatment modalities used include Motivational Interviewing Solution Focused Therapy Interpersonal Coping Skills: Assisted patient in understanding the purpose of planning / creating / participating / sharing in positive experiences and Facilitated understanding of  what factors may contribute to symptom relapse and skills plan to manage symptom relapse  and discussed application of self compassion and explored challenging unrealistic expectations of self/others  Support, Feedback, Problem Solving, and Clarification    Patient Response:   Patient responded to session by accepting feedback, focusing on goals, accepting support, and actively engaged  Possible barriers to participation / learning include: N/A    Current Mental Status Exam:   Appearance:  Appropriate   Eye Contact:  Good   Attitude / Demeanor: Cooperative   Speech      Rate / Production: Normal/ Responsive      Volume:  Normal  volume  Orientation:  All  Mood:   Depressed   Affect:   Appropriate   Thought Content: Clear   Insight:   Good       Plan/Need for Future Services:  Return for therapy in 4 weeks to treat diagnosed problems.    Patient has a current master individualized treatment plan.  See Epic treatment plan for more information.    Referral / Collaboration:  Referral to another professional/service is not indicated at this time..  Emergency Services Needed?  No    Assignment:  Return in one month  Exporess gratitude with people in support system  Go back to yoga    Interactive  Complexity:  There are four specific communication difficulties that complicate the work of the primary psychiatric procedure.  Interactive complexity (+37591) may be reported when at least one of these difficulties is present.    Communication difficulties present during current the psychiatric procedure include:  None.      Signature/Title:    Rosalinda Avendano, LARS, [facility]

## 2024-03-07 ENCOUNTER — OFFICE VISIT (OUTPATIENT)
Dept: PSYCHOLOGY | Facility: CLINIC | Age: 54
End: 2024-03-07
Payer: COMMERCIAL

## 2024-03-07 DIAGNOSIS — F34.1 DYSTHYMIC DISORDER: Primary | ICD-10-CM

## 2024-03-07 DIAGNOSIS — F91.8 CONDUCT DISORDER, UNDIFFERENTIATED TYPE: ICD-10-CM

## 2024-03-07 PROCEDURE — 90853 GROUP PSYCHOTHERAPY: CPT | Performed by: MARRIAGE & FAMILY THERAPIST

## 2024-03-07 NOTE — GROUP NOTE
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 Not having issues with CSB.     Progress Toward Treatment Goals:   Stable/Maintenance of progress     Therapeutic Interventions/Treatment Strategies:    Area(s) of treatment focus addressed in this session included Interpersonal Relationship Skills and Maintenance of Progress    Patient checked in about their mental health symptoms, healthy coping skills used over the week, negative coping skills used over the week, what sexual behaviors they engaged in-fantasy, masturbation, sex, their comfort level with their behaviors, if there were triggers, urges to violate boundaries, and violations of boundaries.  They took time to process about the past month and relationships they provided support and feedback to others in the group.    Psychotherapist offered support, feedback and validation and reinforced use of skills Treatment modalities used include [facility] THERAPY INTERVENTIONS: Group Dynamic Therapy  Interpersonal Relationship Skills: Assisted patients in implementing more effective communication skills in their relationships and Encouraged development and maintenance  of healthy boundaries and discussed application of self compassion  Support, Feedback, and  Structured Activity    Patient Response:   Patient responded to session by accepting feedback, giving feedback, and listening  Possible barriers to participation / learning include: N/A    Current Mental Status Exam:   Appearance:  Appropriate   Eye Contact:  Good   Attitude / Demeanor: Cooperative   Speech      Rate / Production: Normal/ Responsive      Volume:  Normal  volume  Orientation:  All  Mood:   Anxious  Normal  Affect:   Appropriate   Thought Content: Clear   Insight:   Good       Plan/Need for Future Services:  Return for therapy in 4 weeks to treat diagnosed problems.    Patient has a current master individualized treatment plan.  See Epic treatment plan for more information.    Referral / Collaboration:  Referral to another professional/service is not indicated at this time..  Emergency Services Needed?  No    Assignment:  Return in 4 weeks    Interactive Complexity:  There are four specific communication difficulties that complicate the work of the primary psychiatric procedure.  Interactive complexity (+26285) may be reported when at least one of these difficulties is present.    Communication difficulties present during current the psychiatric procedure include:  None.      Signature/Title:    Doe Avendano, LMFT, [facility]

## 2024-03-19 ENCOUNTER — VIRTUAL VISIT (OUTPATIENT)
Dept: PSYCHOLOGY | Facility: CLINIC | Age: 54
End: 2024-03-19
Payer: COMMERCIAL

## 2024-03-19 DIAGNOSIS — F91.8 CONDUCT DISORDER, UNDIFFERENTIATED TYPE: ICD-10-CM

## 2024-03-19 DIAGNOSIS — F34.1 DYSTHYMIC DISORDER: Primary | ICD-10-CM

## 2024-03-19 PROCEDURE — 90834 PSYTX W PT 45 MINUTES: CPT | Mod: 95 | Performed by: MARRIAGE & FAMILY THERAPIST

## 2024-03-19 NOTE — NURSING NOTE
Is the patient currently in the state of [place]? YES    Visit mode:VIDEO    If the visit is dropped, the patient can be reconnected by: VIDEO VISIT: Text to cell phone:   Telephone Information:   Mobile [phone]       Will anyone else be joining the visit? NO  (If patient encounters technical issues they should call [phone])    How would you like to obtain your AVS? MyChart    Are changes needed to the allergy or medication list? No    Reason for visit: RECHECK    Jono GUERIN

## 2024-03-19 NOTE — PROGRESS NOTES
Virtual Visit Details    Type of service:  Video Visit     Originating Location (pt. Location): Home    Distant Location (provider location):  Off-site  Platform used for Video Visit: [facility] for [facility] Health - Progress Note    Date of Service: 3/19/24   Name: Javi Salmeron  : 1970  Medical Record Number: [id number]  Treating Provider: LARS Goncalves, NIA  Type of Session: Individual  Present in Session: pt  Session Start and Stop Time: 300pm-348pm  Number of Minutes:  48    SERVICE MODALITY:  Video Visit:      Provider verified identity through the following two step process.  Patient provided:  Patient is known previously to provider    Telemedicine Visit: The patient's condition can be safely assessed and treated via synchronous audio and visual telemedicine encounter.      Reason for Telemedicine Visit: Services only offered telehealth    Originating Site (Patient Location): Patient's home    Distant Site (Provider Location): [facility]    Consent:  The patient/guardian has verbally consented to: the potential risks and benefits of telemedicine (video visit) versus in person care; bill my insurance or make self-payment for services provided; and responsibility for payment of non-covered services.     Patient would like the video invitation sent by:  My Chart    Mode of Communication:  Video Conference via [facility]    Distant Location (Provider):  Off-site    As the provider I attest to compliance with applicable laws and regulations related to telemedicine.    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 Not having issues with CSB.     Progress Toward Treatment Goals:   Satisfactory  progress   Stable/Maintenance of progress     Therapeutic Interventions/Treatment Strategies:    Area(s) of treatment focus addressed in this session included Symptom Management, Interpersonal Relationship Skills, and Maintenance of Progress    Psychotherapist offered support, feedback and validation and reinforced use of skills Treatment modalities used include Solution Focused Therapy Narrative Therapy Interpersonal Coping Skills: Assisted patient in understanding the purpose of planning / creating / participating / sharing in positive experiences, Relationship Skills: Discussed strategies to promote healthier understanding of interpersonal relationships, and discussed application of self compassion and explored challenging unrealistic expectations of self/others  Support and Feedback    Patient Response:   Patient responded to session by listening, accepting support, verbalizing understanding, and actively engaged  Possible barriers to participation / learning include: N/A    Current Mental Status Exam:   Appearance:  Appropriate   Eye Contact:  Good   Attitude / Demeanor: Cooperative   Speech      Rate / Production: Normal/ Responsive      Volume:  Normal  volume  Orientation:  All  Mood:   Normal Euthymic  Affect:   Appropriate   Thought Content: Clear   Insight:   Good       Plan/Need for Future Services:  Return for therapy in 4 weeks to treat diagnosed problems.    Patient has a current master individualized treatment plan.  See Epic treatment plan for more information.    Referral / Collaboration:  Referral to another professional/service is not indicated at this time..  Emergency Services Needed?  No    Assignment:  Dating women at places that are interesting and facilitate conversation    Interactive Complexity:  There are four specific communication difficulties that complicate the work of the primary psychiatric procedure.  Interactive complexity (+87554) may be reported when at least one of these  difficulties is present.    Communication difficulties present during current the psychiatric procedure include:  None.      Signature/Title:    LARS Goncalves, [facility]

## 2024-03-27 DIAGNOSIS — F32.A DEPRESSION, UNSPECIFIED DEPRESSION TYPE: ICD-10-CM

## 2024-03-27 DIAGNOSIS — E78.5 HYPERLIPIDEMIA LDL GOAL <100: ICD-10-CM

## 2024-03-27 RX ORDER — CITALOPRAM HYDROBROMIDE 40 MG/1
40 TABLET ORAL EVERY MORNING
Qty: 90 TABLET | Refills: 0 | Status: SHIPPED | OUTPATIENT
Start: 2024-03-27 | End: 2024-07-05

## 2024-03-27 RX ORDER — ATORVASTATIN CALCIUM 10 MG/1
TABLET, FILM COATED ORAL
Qty: 90 TABLET | Refills: 0 | Status: SHIPPED | OUTPATIENT
Start: 2024-03-27 | End: 2024-07-05

## 2024-03-27 NOTE — TELEPHONE ENCOUNTER
Medication requested: citalopram (CELEXA) 40 MG tablet   Last office visit: 1/3/24  [facility] appointments: none  Medication last refilled: 3/13/23; 90 + 3 refills  Last qualifying labs:    6/19/2023  4:54 PM   PHQ-9 / RAGINI-7 Scores 8/2015 to present    RAGINI-7 Score DocFlow 1       6/19/2023  4:54 PM   PHQ-9 / RAGINI-7 Scores 8/2015 to present    PHQ-9 Score DocFlow 3      Medication requested: atorvastatin (LIPITOR) 10 MG tablet   Medication last refilled: 3/13/23; 90 + 3 refills  Last qualifying labs:   Component      Latest Ref Rng 3/13/2023  8:30 AM   Cholesterol      <200 mg/dL 156    Triglycerides      <150 mg/dL 87    HDL Cholesterol      >=40 mg/dL 41    LDL Cholesterol Calculated      <=100 mg/dL 98    Non HDL Cholesterol      <130 mg/dL 115      Medication is being filled for 1 time refill only due to:  Patient needs to be seen because due for med follow-up, PHQ9/GAD7, lipid panel.    Sent message to pt to schedule physical with Dr. Becker in the next three months.    Lauro PIERCE, RN  03/27/24 11:59 AM

## 2024-04-01 ENCOUNTER — TELEPHONE (OUTPATIENT)
Dept: FAMILY MEDICINE | Facility: CLINIC | Age: 54
End: 2024-04-01

## 2024-04-01 NOTE — TELEPHONE ENCOUNTER
Itinerary: (List all countries)  [facility]  Departure Date: 6/18  Return Date: 7/5  Reason for Travel (i.e. business, pleasure): Pleasure - visiting with family  Visiting an urban or rural area? [place], [facility]   [employer] travel?  What type? Just visiting family  Accommodations (i.e. hotel, hostel, friends, family etc.): Staying with family, resort  Do you have copy of complete vaccine record? Partial    You will need a travel visit with one of the providers in the clinic. Does patient understand this: yes/no: YES  Check with your insurance for coverage of a travel visit and coverage of vaccinations. Does patient understand this: yes/no: YES    Pt will contact insurance provider for more information on whether travel medicine services are covered.    Pt will need typhoid vaccine. Unsure if patient is immunized against Hep A/B, received routine vaccinations as child when he emigrated to [place] with family from [facility] at age 3. Will need abx for Traveler's diarrhea. Malaria chemoprophylaxis not required based on negligible transmission in area where traveling, although insect precautions are recommended.    Lauro PIERCE, RN  04/01/24 10:39 AM

## 2024-04-05 ENCOUNTER — OFFICE VISIT (OUTPATIENT)
Dept: FAMILY MEDICINE | Facility: CLINIC | Age: 54
End: 2024-04-05
Payer: COMMERCIAL

## 2024-04-05 VITALS
WEIGHT: 199 LBS | TEMPERATURE: 97.5 F | SYSTOLIC BLOOD PRESSURE: 126 MMHG | HEART RATE: 74 BPM | DIASTOLIC BLOOD PRESSURE: 74 MMHG | BODY MASS INDEX: 26.95 KG/M2 | OXYGEN SATURATION: 97 % | HEIGHT: 72 IN

## 2024-04-05 DIAGNOSIS — Z11.3 SCREENING FOR STDS (SEXUALLY TRANSMITTED DISEASES): ICD-10-CM

## 2024-04-05 DIAGNOSIS — I10 ESSENTIAL HYPERTENSION, BENIGN: ICD-10-CM

## 2024-04-05 DIAGNOSIS — Z71.84 TRAVEL ADVICE ENCOUNTER: Primary | ICD-10-CM

## 2024-04-05 DIAGNOSIS — A09 TRAVELER'S DIARRHEA: ICD-10-CM

## 2024-04-05 DIAGNOSIS — Z23 NEED FOR HEPATITIS A IMMUNIZATION: ICD-10-CM

## 2024-04-05 DIAGNOSIS — Z23 NEED FOR IMMUNIZATION AGAINST TYPHOID: ICD-10-CM

## 2024-04-05 DIAGNOSIS — F42.9 OBSESSIVE-COMPULSIVE DISORDER, UNSPECIFIED TYPE: ICD-10-CM

## 2024-04-05 DIAGNOSIS — F32.A DEPRESSION, UNSPECIFIED DEPRESSION TYPE: ICD-10-CM

## 2024-04-05 LAB
HIV 1+2 AB+HIV1 P24 AG SERPL QL IA: NONREACTIVE
T PALLIDUM AB SER QL: NONREACTIVE

## 2024-04-05 PROCEDURE — 87591 N.GONORRHOEAE DNA AMP PROB: CPT | Performed by: FAMILY MEDICINE

## 2024-04-05 PROCEDURE — 87491 CHLMYD TRACH DNA AMP PROBE: CPT | Performed by: FAMILY MEDICINE

## 2024-04-05 PROCEDURE — 86780 TREPONEMA PALLIDUM: CPT | Performed by: FAMILY MEDICINE

## 2024-04-05 PROCEDURE — 87389 HIV-1 AG W/HIV-1&-2 AB AG IA: CPT | Performed by: FAMILY MEDICINE

## 2024-04-05 RX ORDER — NALTREXONE HYDROCHLORIDE 50 MG/1
50 TABLET, FILM COATED ORAL DAILY
Qty: 90 TABLET | Refills: 3 | Status: SHIPPED | OUTPATIENT
Start: 2024-04-05

## 2024-04-05 RX ORDER — AZITHROMYCIN 500 MG/1
TABLET, FILM COATED ORAL
Qty: 3 TABLET | Refills: 0 | Status: SHIPPED | OUTPATIENT
Start: 2024-04-05

## 2024-04-05 RX ORDER — CITALOPRAM HYDROBROMIDE 40 MG/1
40 TABLET ORAL EVERY MORNING
Qty: 90 TABLET | Refills: 0 | Status: CANCELLED | OUTPATIENT
Start: 2024-04-05

## 2024-04-05 RX ORDER — HYDROCHLOROTHIAZIDE 25 MG/1
25 TABLET ORAL DAILY
Qty: 90 TABLET | Refills: 3 | Status: SHIPPED | OUTPATIENT
Start: 2024-04-05

## 2024-04-05 SDOH — HEALTH STABILITY: PHYSICAL HEALTH: ON AVERAGE, HOW MANY DAYS PER WEEK DO YOU ENGAGE IN MODERATE TO STRENUOUS EXERCISE (LIKE A BRISK WALK)?: 0 DAYS

## 2024-04-05 ASSESSMENT — SOCIAL DETERMINANTS OF HEALTH (SDOH): HOW OFTEN DO YOU GET TOGETHER WITH FRIENDS OR RELATIVES?: ONCE A WEEK

## 2024-04-05 NOTE — NURSING NOTE
Prior to immunization administration, verified patients identity using patient s name and date of birth. Please see Immunization Activity for additional information.     Screening Questionnaire for Adult Immunization    Are you sick today?   No   Do you have allergies to medications, food, a vaccine component or latex?   No   Have you ever had a serious reaction after receiving a vaccination?   No   Do you have a long-term health problem with heart, lung, kidney, or metabolic disease (e.g., diabetes), asthma, a blood disorder, no spleen, complement component deficiency, a cochlear implant, or a spinal fluid leak?  Are you on long-term aspirin therapy?   No   Do you have cancer, leukemia, HIV/AIDS, or any other immune system problem?   No   Do you have a parent, brother, or sister with an immune system problem?   No   In the past 3 months, have you taken medications that affect  your immune system, such as prednisone, other steroids, or anticancer drugs; drugs for the treatment of rheumatoid arthritis, Crohn s disease, or psoriasis; or have you had radiation treatments?   No   Have you had a seizure, or a brain or other nervous system problem?   No   During the past year, have you received a transfusion of blood or blood    products, or been given immune (gamma) globulin or antiviral drug?   No   For women: Are you pregnant or is there a chance you could become       pregnant during the next month?   No   Have you received any vaccinations in the past 4 weeks?   No     Immunization questionnaire answers were all negative.      Patient given Hepatitis A and Typhoid vaccines and instructed to remain in clinic for 15 minutes afterwards, and to report any adverse reactions.     Screening performed by Rebeca Rico LPN on 4/5/2024 at 11:27 AM.

## 2024-04-05 NOTE — PROGRESS NOTES
ASSESSMENT and PLAN:     Travel to [place]  Will give Typhoid and Hep A immunizations.  Return in 6 months for Hep A #2  Discussed strategies to avoid infections including travellers diarrhea  Given Azithromax to take with onset of infection. Take 500 mg and then repeat in 12 hours and take 3rd dose if needed  Discussed long sleeve shirts and pants if around mosquitos  Avoid street foods  Will screen for STDs per request  Refilled medications.         Follow-up For annual exam in 6 months. You can get Hep A #2 at that time.     Mt Becker MD  Family Medicine and Sports Medicine  [facility]      Medical assistant intake:  Javi Salmeron is a 53 year old male who presents to [facility] today for Physical (Pt is also traveling to [place]. )      SUBJECTIVE:     Javi is a 52 yo who is about to travel to [place].   Details are in nurse intake as per the following:    Itinerary: (List all countries)  [place]  Departure Date: 6/18  Return Date: 7/5  Reason for Travel (i.e. business, pleasure): Pleasure - visiting with family  Visiting an urban or rural area? [place], [facility]   [employer] travel?  What type? Just visiting family  Accommodations (i.e. hotel, hostel, friends, family etc.): Staying with family, resort  Do you have copy of complete vaccine record? Partial     You will need a travel visit with one of the providers in the clinic. Does patient understand this: yes/no: YES  Check with your insurance for coverage of a travel visit and coverage of vaccinations. Does patient understand this: yes/no: YES     Pt will contact insurance provider for more information on whether travel medicine services are covered.     Pt will need typhoid vaccine. Unsure if patient is immunized against Hep A/B, received routine vaccinations as child when he emigrated to [place] with family from [place] at age 3. Will need abx for Traveler's diarrhea. Malaria chemoprophylaxis not required based on negligible  transmission in area where traveling, although insect precautions are recommended.             Immunization History   Administered Date(s) Administered    COVID-19 12+ (2023-24) (Pfizer) 12/21/2023    COVID-19 MONOVALENT 12+ (Pfizer) 05/20/2021, 06/17/2021    COVID-19 Monovalent 12+ (Pfizer 2022) 02/23/2022    Influenza (IIV3) PF 10/26/2012    Influenza Vaccine 18-64 (Flublok) 10/10/2018, 11/15/2021    Influenza Vaccine >6 months,quad, PF 11/09/2019, 10/19/2020, 02/23/2022, 02/23/2023, 12/21/2023    TDAP (Adacel,Boostrix) 11/04/2020    TDAP Vaccine (Adacel) 05/24/2010    Zoster recombinant adjuvanted (SHINGRIX) 02/23/2023, 12/21/2023       Also, needs refills on a few chronic medications and interested in STD screen as had unprotected sex recently.     Review Of Systems:    See subjective.   Has otherwise been in usual state of health, e.g.   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)     Major depression in complete remission (H)       Current Outpatient Medications   Medication Sig Dispense Refill    amLODIPine (NORVASC) 10 MG tablet TAKE 1 TABLET(10 MG) BY MOUTH DAILY 90 tablet 1    atorvastatin (LIPITOR) 10 MG tablet TAKE 1 TABLET BY MOUTH DAILY 90 tablet 0    citalopram (CELEXA) 40 MG tablet TAKE 1 TABLET(40 MG) BY MOUTH EVERY MORNING 90 tablet 0    hydrochlorothiazide (HYDRODIURIL) 25 MG tablet Take 1 tablet (25 mg) by mouth daily 90 tablet 3    naltrexone (DEPADE/REVIA) 50 MG tablet Take 1 tablet (50 mg) by mouth daily 90 tablet 3    benzonatate (TESSALON) 200 MG capsule Take 1 capsule (200 mg) by mouth 3 times daily  "as needed for cough (Patient not taking: Reported on 4/5/2024) 30 capsule 0       Allergies   Allergen Reactions    Dust Mites     Grass     Ragweeds     Codeine Anxiety     \"gets loopy\" - doesn't like how it feels        Social:   Unchanged    OBJECTIVE    Vitals: /74   Pulse 74   Temp 97.5  F (36.4  C)   Ht 1.829 m (6')   Wt 90.3 kg (199 lb)   SpO2 97%   BMI 26.99 kg/m    BMI= Body mass index is 26.99 kg/m .  Appears in usual state of health.     SEE TOP OF NOTE FOR ASSESSMENT AND PLAN  30 minutes spent on the date of the encounter doing chart review, history and exam, documentation and further activities as noted in the note.     --Mt Becker MD  [facility], Department of Family Medicine and Community Health  "

## 2024-04-05 NOTE — PATIENT INSTRUCTIONS
ASSESSMENT and PLAN:     Travel to [place]  Will give Typhoid and Hep A immunizations.  Return in 6 months for Hep A #2  Discussed strategies to avoid infections including travellers diarrhea  Given Azithromax to take with onset of infection. Take 500 mg and then repeat in 12 hours and take 3rd dose if needed  Will screen for STDs per request  Refilled medications.       Follow-up For annual exam in 6 months. You can get Hep A #2 at that time.     Mt Becker MD  Family Medicine and Sports Medicine  [facility]

## 2024-04-05 NOTE — NURSING NOTE
53 year old  Chief Complaint   Patient presents with    Physical     Pt is also traveling to [place].        Blood pressure 126/74, pulse 74, temperature 97.5  F (36.4  C), weight 90.3 kg (199 lb), SpO2 97%. Body mass index is 26.99 kg/m .  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)     Major depression in complete remission (H)       Wt Readings from Last 2 Encounters:   04/05/24 90.3 kg (199 lb)   01/03/24 88 kg (194 lb)     BP Readings from Last 3 Encounters:   04/05/24 126/74   01/03/24 (!) 145/91   06/19/23 126/85         Current Outpatient Medications   Medication Sig Dispense Refill    amLODIPine (NORVASC) 10 MG tablet TAKE 1 TABLET(10 MG) BY MOUTH DAILY 90 tablet 1    atorvastatin (LIPITOR) 10 MG tablet TAKE 1 TABLET BY MOUTH DAILY 90 tablet 0    citalopram (CELEXA) 40 MG tablet TAKE 1 TABLET(40 MG) BY MOUTH EVERY MORNING 90 tablet 0    hydrochlorothiazide (HYDRODIURIL) 25 MG tablet Take 1 tablet (25 mg) by mouth daily 90 tablet 3    naltrexone (DEPADE/REVIA) 50 MG tablet Take 1 tablet (50 mg) by mouth daily 90 tablet 3    benzonatate (TESSALON) 200 MG capsule Take 1 capsule (200 mg) by mouth 3 times daily as needed for cough (Patient not taking: Reported on 4/5/2024) 30 capsule 0     No current facility-administered medications for this visit.       Social History     Tobacco Use    Smoking status: Never    Smokeless tobacco: Never   Substance Use Topics    Alcohol use: Yes     Comment: one or two a month    Drug use: No       Health Maintenance Due   Topic Date Due    ADVANCE CARE PLANNING  Never done    DEPRESSION ACTION PLAN  Never done    HEPATITIS B IMMUNIZATION (1 of 3 - 19+ 3-dose series) Never done    MENTAL HEALTH TX PLAN  09/04/2022    YEARLY PREVENTIVE VISIT  11/15/2022    LIPID   "03/13/2024       No results found for: \"PAP\"      April 5, 2024 9:39 AM    "

## 2024-04-06 LAB
C TRACH DNA SPEC QL NAA+PROBE: NEGATIVE
N GONORRHOEA DNA SPEC QL NAA+PROBE: NEGATIVE

## 2024-04-26 DIAGNOSIS — F42.9 OBSESSIVE-COMPULSIVE DISORDER, UNSPECIFIED TYPE: ICD-10-CM

## 2024-04-26 RX ORDER — NALTREXONE HYDROCHLORIDE 50 MG/1
50 TABLET, FILM COATED ORAL DAILY
Qty: 90 TABLET | Refills: 3 | OUTPATIENT
Start: 2024-04-26

## 2024-05-02 ENCOUNTER — OFFICE VISIT (OUTPATIENT)
Dept: PSYCHOLOGY | Facility: CLINIC | Age: 54
End: 2024-05-02
Payer: COMMERCIAL

## 2024-05-02 DIAGNOSIS — F91.8 CONDUCT DISORDER, UNDIFFERENTIATED TYPE: ICD-10-CM

## 2024-05-02 DIAGNOSIS — F34.1 DYSTHYMIC DISORDER: Primary | ICD-10-CM

## 2024-05-02 PROCEDURE — 90853 GROUP PSYCHOTHERAPY: CPT | Performed by: MARRIAGE & FAMILY THERAPIST

## 2024-05-02 NOTE — GROUP NOTE
[facility]  [address], [address]  [place]  [address]    Group Progress Note  [facility] - Progress Note    Date of Service: 24   Name: Javi Salmeron  : 1970  Medical Record Number: [id number]  START TIME:  4:30 PM  END TIME:  6:30 PM  FACILITATOR(S): Rosalinda Avendano, LMFT, [facility]  TOPIC: [facility] Group Therapy  Type of Session: Individual  Number of Minutes:  /120    SERVICE MODALITY:  In-person      DSM-5 Diagnoses:  300.4 Dysthymia  312.89 (F91.8) Other Specified Disruptive, Impulse Control, and Conduct Disorder (Hypersexual Disorder) 2    Current Reported Symptoms and Status update:  Changes since last session-things going well  Depression reasonably well managed. Not having issues with CSB.     Progress Toward Treatment Goals:   Stable/Maintenance of progress     Therapeutic Interventions/Treatment Strategies:    Area(s) of treatment focus addressed in this session included Symptom Management and Maintenance of Progress    Patient checked in about their mental health symptoms, healthy coping skills used over the week, negative coping skills used over the week, what sexual behaviors they engaged in-fantasy, masturbation, sex, their comfort level with their behaviors, if there were triggers, urges to violate boundaries, and violations of boundaries.  They took time to process about past few months and relationship updates they provided support and feedback to others in the group.    Psychotherapist offered support, feedback and validation and reinforced use of skills Treatment modalities used include [facility] THERAPY INTERVENTIONS: Group Dynamic Therapy  Interpersonal Relationship Skills: Assisted patients in implementing more effective communication skills in their relationships and Encouraged development and maintenance  of healthy boundaries  Support, Feedback, and Structured Activity    Patient Response:   Patient responded to session by accepting  feedback, giving feedback, and listening  Possible barriers to participation / learning include: N/A    Current Mental Status Exam:   Appearance:  Appropriate   Eye Contact:  Good   Attitude / Demeanor: Cooperative   Speech      Rate / Production: Normal/ Responsive      Volume:  Normal  volume  Orientation:  All  Mood:   Normal Euthymic  Affect:   Appropriate   Thought Content: Clear   Insight:   Good       Plan/Need for Future Services:  Return for therapy in 4 weeks to treat diagnosed problems.    Patient has a current master individualized treatment plan.  See Epic treatment plan for more information.    Referral / Collaboration:  Referral to another professional/service is not indicated at this time..  Emergency Services Needed?  No    Assignment:  Return in 4 weeks    Interactive Complexity:  There are four specific communication difficulties that complicate the work of the primary psychiatric procedure.  Interactive complexity (+76966) may be reported when at least one of these difficulties is present.    Communication difficulties present during current the psychiatric procedure include:  None.      Signature/Title:    Doe Avendano, LMFT, [facility]

## 2024-05-14 ENCOUNTER — VIRTUAL VISIT (OUTPATIENT)
Dept: PSYCHOLOGY | Facility: CLINIC | Age: 54
End: 2024-05-14
Payer: COMMERCIAL

## 2024-05-14 DIAGNOSIS — F34.1 DYSTHYMIC DISORDER: Primary | ICD-10-CM

## 2024-05-14 DIAGNOSIS — F91.8 CONDUCT DISORDER, UNDIFFERENTIATED TYPE: ICD-10-CM

## 2024-05-14 PROCEDURE — 90834 PSYTX W PT 45 MINUTES: CPT | Mod: 95 | Performed by: MARRIAGE & FAMILY THERAPIST

## 2024-05-14 NOTE — NURSING NOTE
Is the patient currently in the state of [place]? YES    Visit mode:VIDEO    If the visit is dropped, the patient can be reconnected by: VIDEO VISIT: Text to cell phone:   Telephone Information:   Mobile [phone]       Will anyone else be joining the visit? NO  (If patient encounters technical issues they should call [phone]    How would you like to obtain your AVS? MyChart    Are changes needed to the allergy or medication list? N/A    Are refills needed on medications prescribed by this physician? NO    Reason for visit: RECHECK    Riya GUERIN

## 2024-05-14 NOTE — PROGRESS NOTES
Virtual Visit Details    Type of service:  Video Visit     Originating Location (pt. Location): Home    Distant Location (provider location):  Off-site  Platform used for Video Visit: [facility] - Progress Note    Date of Service: 24   Name: Javi Salmeron  : 1970  Medical Record Number: [id number]  Treating Provider: LARS Goncalves, NIA  Type of Session: Individual  Present in Session: pt  Session Start and Stop Time: 312p-357pm  Number of Minutes:  45    SERVICE MODALITY:  Video Visit:      Provider verified identity through the following two step process.  Patient provided:  Patient is known previously to provider    Telemedicine Visit: The patient's condition can be safely assessed and treated via synchronous audio and visual telemedicine encounter.      Reason for Telemedicine Visit: Services only offered telehealth    Originating Site (Patient Location): Patient's home    Distant Site (Provider Location): [facility]    Consent:  The patient/guardian has verbally consented to: the potential risks and benefits of telemedicine (video visit) versus in person care; bill my insurance or make self-payment for services provided; and responsibility for payment of non-covered services.     Patient would like the video invitation sent by:  My Chart    Mode of Communication:  Video Conference via [facility]    Distant Location (Provider):  Off-site    As the provider I attest to compliance with applicable laws and regulations related to telemedicine.    DSM-5 Diagnoses:  300.4 Dysthymia  312.89 (F91.8) Other Specified Disruptive, Impulse Control, and Conduct Disorder (Hypersexual Disorder)     Current Reported Symptoms and Status update:  Changes since last session-son graduated from community college  Depression reasonably well managed. Not having issues with CSB.     Progress Toward Treatment Goals:   Stable/Maintenance of  progress     Therapeutic Interventions/Treatment Strategies:    Area(s) of treatment focus addressed in this session included Symptom Management, Interpersonal Relationship Skills, and Maintenance of Progress    Psychotherapist offered support, feedback and validation and reinforced use of skills Treatment modalities used include Cognitive Behavioral Therapy Interpersonal Humanist Coping Skills: Assisted patient in understanding the purpose of planning / creating / participating / sharing in positive experiences and Relationship Skills: Assisted patients in implementing more effective communication skills in their relationships and Discussed strategies to promote healthier understanding of interpersonal relationships  Support, Feedback, and Problem Solving    Patient Response:   Patient responded to session by listening, focusing on goals, accepting support, verbalizing understanding, and actively engaged  Possible barriers to participation / learning include: N/A    Current Mental Status Exam:   Appearance:  Appropriate   Eye Contact:  Good   Attitude / Demeanor: Cooperative   Speech      Rate / Production: Normal/ Responsive      Volume:  Normal  volume  Orientation:  All  Mood:   Anxious  Normal  Affect:   Appropriate   Thought Content: Clear   Insight:   Good       Plan/Need for Future Services:  Return for therapy in 4 weeks to treat diagnosed problems.    Patient has a current master individualized treatment plan.  See Epic treatment plan for more information.    Referral / Collaboration:  Referral to another professional/service is not indicated at this time..  Emergency Services Needed?  No    Assignment:  Wants to work on improving confidence and self esteem    Interactive Complexity:  There are four specific communication difficulties that complicate the work of the primary psychiatric procedure.  Interactive complexity (+[id number]) may be reported when at least one of these difficulties is  present.    Communication difficulties present during current the psychiatric procedure include:  None.      Signature/Title:    LARS Goncalves, [facility] [facility]

## 2024-05-15 DIAGNOSIS — I10 ESSENTIAL HYPERTENSION, BENIGN: ICD-10-CM

## 2024-05-16 RX ORDER — AMLODIPINE BESYLATE 10 MG/1
10 TABLET ORAL DAILY
Qty: 90 TABLET | Refills: 1 | Status: SHIPPED | OUTPATIENT
Start: 2024-05-16

## 2024-05-16 NOTE — TELEPHONE ENCOUNTER
Medication requested: amLODIPine (NORVASC) 10 MG tablet   Last office visit: 4/5/24  [facility] appointments: none  Medication last refilled: 12/21/23; 90 + 1 refill  Last qualifying labs:   BP Readings from Last 3 Encounters:   04/05/24 126/74   01/03/24 (!) 145/91   06/19/23 126/85     Component      Latest Ref Rng 3/13/2023  8:30 AM   Creatinine      0.67 - 1.17 mg/dL 1.05      Prescription approved per [facility] Refill Protocol.    Lauro PIERCE, RN  05/16/24 9:45 AM

## 2024-06-06 ENCOUNTER — OFFICE VISIT (OUTPATIENT)
Dept: PSYCHOLOGY | Facility: CLINIC | Age: 54
End: 2024-06-06
Payer: COMMERCIAL

## 2024-06-06 DIAGNOSIS — F34.1 DYSTHYMIC DISORDER: Primary | ICD-10-CM

## 2024-06-06 DIAGNOSIS — F91.8 CONDUCT DISORDER, UNDIFFERENTIATED TYPE: ICD-10-CM

## 2024-06-06 PROCEDURE — 90853 GROUP PSYCHOTHERAPY: CPT | Performed by: MARRIAGE & FAMILY THERAPIST

## 2024-06-06 NOTE — GROUP NOTE
[facility]  [address], [address]  [place]  [address]    Group Progress Note  [facility] - Progress Note    Date of Service: 24   Name: Javi Salmeron  : 1970  Medical Record Number: [id number]  START TIME:  4:30 PM  END TIME:  6:30 PM  FACILITATOR(S): Rosalinda Avendano LMFT, [facility]  TOPIC: [facility] Group Therapy  Type of Session: Group  :  Number of Attendees:  6  Number of Minutes:  /120    SERVICE MODALITY:  In-person      DSM-5 Diagnoses:  300.4 Dysthymia  312.89 (F91.8) Other Specified Disruptive, Impulse Control, and Conduct Disorder (Hypersexual Disorder)     Current Reported Symptoms and Status update:  Changes since last session-preparing for trip with mom   Depression reasonably well managed. Not having issues with CSB.     Progress Toward Treatment Goals:   Stable/Maintenance of progress     Therapeutic Interventions/Treatment Strategies:    Area(s) of treatment focus addressed in this session included Symptom Management and Maintenance of Progress    Patient checked in about their mental health symptoms, healthy coping skills used over the week, negative coping skills used over the week, what sexual behaviors they engaged in-fantasy, masturbation, sex, their comfort level with their behaviors, if there were triggers, urges to violate boundaries, and violations of boundaries.  They took time to process about preparation for trip with family  they provided support and feedback to others in the group.    Psychotherapist offered support, feedback and validation and reinforced use of skills Treatment modalities used include [facility] THERAPY INTERVENTIONS: Group Dynamic Therapy  Interpersonal Coping Skills: Facilitated understanding of  what factors may contribute to symptom relapse and skills plan to manage symptom relapse   Support, Feedback, and Structured Activity    Patient Response:   Patient responded to session by accepting feedback, giving  feedback, and listening  Possible barriers to participation / learning include: N/A    Current Mental Status Exam:   Appearance:  Appropriate   Eye Contact:  Good   Attitude / Demeanor: Cooperative   Speech      Rate / Production: Normal/ Responsive      Volume:  Normal  volume  Orientation:  All  Mood:   Anxious  Euthymic  Affect:   Appropriate   Thought Content: Clear   Insight:   Good       Plan/Need for Future Services:  Return for therapy in 4 weeks to treat diagnosed problems.    Patient has a current master individualized treatment plan.  See Epic treatment plan for more information.    Referral / Collaboration:  Referral to another professional/service is not indicated at this time..  Emergency Services Needed?  No    Assignment:  Return in 4 weeks     Interactive Complexity:  There are four specific communication difficulties that complicate the work of the primary psychiatric procedure.  Interactive complexity (+21095) may be reported when at least one of these difficulties is present.    Communication difficulties present during current the psychiatric procedure include:  None.      Signature/Title:    LARS Zuleta, [facility]

## 2024-07-05 DIAGNOSIS — F32.A DEPRESSION, UNSPECIFIED DEPRESSION TYPE: ICD-10-CM

## 2024-07-05 DIAGNOSIS — E78.5 HYPERLIPIDEMIA LDL GOAL <100: ICD-10-CM

## 2024-07-05 RX ORDER — ATORVASTATIN CALCIUM 10 MG/1
TABLET, FILM COATED ORAL
Qty: 90 TABLET | Refills: 0 | Status: SHIPPED | OUTPATIENT
Start: 2024-07-05

## 2024-07-05 RX ORDER — CITALOPRAM HYDROBROMIDE 40 MG/1
40 TABLET ORAL EVERY MORNING
Qty: 90 TABLET | Refills: 0 | Status: SHIPPED | OUTPATIENT
Start: 2024-07-05

## 2024-07-05 NOTE — TELEPHONE ENCOUNTER
Atorvastatin (Lipitor) 10 mg +  Citalopram (Celexa) 40 mg    Last Office Visit: 4/5/24  [facility] Appointments: None  Medication last refilled:     Medication last refilled:   3/27/24 #90 with 0 refill(s) - Atorvastatin  3/27/24 #90 with 0 refill(s) - Citalopram    PHQ-9 / RAGINI-7 Scores 2/8/23 6/19/23 8/4/23   RAGINI-7 Score DocFlow 0 1 --   PHQ-9 Score DocFlow 3 3 3     Prescription approved per [facility] Refill Protocol.    OFELIA oLpezN, RN, CCM

## 2024-08-01 ENCOUNTER — OFFICE VISIT (OUTPATIENT)
Dept: PSYCHOLOGY | Facility: CLINIC | Age: 54
End: 2024-08-01
Payer: COMMERCIAL

## 2024-08-01 DIAGNOSIS — F34.1 DYSTHYMIC DISORDER: Primary | ICD-10-CM

## 2024-08-01 DIAGNOSIS — F91.8 CONDUCT DISORDER, UNDIFFERENTIATED TYPE: ICD-10-CM

## 2024-08-01 PROCEDURE — 90853 GROUP PSYCHOTHERAPY: CPT | Performed by: MARRIAGE & FAMILY THERAPIST

## 2024-08-01 NOTE — GROUP NOTE
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 Not having issues with CSB.     Progress Toward Treatment Goals:   Stable/Maintenance of progress     Therapeutic Interventions/Treatment Strategies:    Area(s) of treatment focus addressed in this session included Symptom Management and Interpersonal Relationship Skills    Patient checked in about their mental health symptoms, healthy coping skills used over the week, negative coping skills used over the week, what sexual behaviors they engaged in-fantasy, masturbation, sex, their comfort level with their behaviors, if there were triggers, urges to violate boundaries, and violations of boundaries.  They took time to process about past month they provided support and feedback to others in the group.    Psychotherapist offered support, feedback and validation and reinforced use of skills Treatment modalities used include [facility] THERAPY INTERVENTIONS: Cognitive Behavioral Therapy Group Dynamic Therapy  Interpersonal Coping Skills: Assisted patient in understanding the purpose of planning / creating / participating / sharing in positive experiences and discussed application of self compassion  Support    Patient Response:   Patient responded to session by accepting  feedback, giving feedback, and listening  Possible barriers to participation / learning include: N/A    Current Mental Status Exam:   Appearance:  Appropriate   Eye Contact:  Good   Attitude / Demeanor: Cooperative   Speech      Rate / Production: Normal/ Responsive      Volume:  Normal  volume  Orientation:  All  Mood:   Normal Euthymic  Affect:   Appropriate   Thought Content: Clear   Insight:   Good       Plan/Need for Future Services:  Return for therapy in 4 weeks to treat diagnosed problems.    Patient has a current master individualized treatment plan.  See Epic treatment plan for more information.    Referral / Collaboration:  Referral to another professional/service is not indicated at this time..  Emergency Services Needed?  No    Assignment:  Return in one month    Interactive Complexity:  There are four specific communication difficulties that complicate the work of the primary psychiatric procedure.  Interactive complexity (+75396) may be reported when at least one of these difficulties is present.    Communication difficulties present during current the psychiatric procedure include:  None.      Signature/Title:    Doe Avendano, LMFT, [facility]

## 2024-08-13 ENCOUNTER — VIRTUAL VISIT (OUTPATIENT)
Dept: PSYCHOLOGY | Facility: CLINIC | Age: 54
End: 2024-08-13
Payer: COMMERCIAL

## 2024-08-13 DIAGNOSIS — F34.1 DYSTHYMIC DISORDER: Primary | ICD-10-CM

## 2024-08-13 DIAGNOSIS — F91.8 CONDUCT DISORDER, UNDIFFERENTIATED TYPE: ICD-10-CM

## 2024-08-13 PROCEDURE — 90834 PSYTX W PT 45 MINUTES: CPT | Mod: 95 | Performed by: MARRIAGE & FAMILY THERAPIST

## 2024-08-13 ASSESSMENT — PATIENT HEALTH QUESTIONNAIRE - PHQ9
10. IF YOU CHECKED OFF ANY PROBLEMS, HOW DIFFICULT HAVE THESE PROBLEMS MADE IT FOR YOU TO DO YOUR WORK, TAKE CARE OF THINGS AT HOME, OR GET ALONG WITH OTHER PEOPLE: NOT DIFFICULT AT ALL
SUM OF ALL RESPONSES TO PHQ QUESTIONS 1-9: 0
SUM OF ALL RESPONSES TO PHQ QUESTIONS 1-9: 0

## 2024-08-13 NOTE — NURSING NOTE
Current patient location: [address] [place] [address]  [place] [address]    Is the patient currently in the state of [place]? YES    Visit mode:VIDEO    If the visit is dropped, the patient can be reconnected by: VIDEO VISIT: Text to cell phone:   Telephone Information:   Mobile [phone]       Will anyone else be joining the visit? NO  (If patient encounters technical issues they should call [phone]    How would you like to obtain your AVS? MyChart    Are changes needed to the allergy or medication list? No    Are refills needed on medications prescribed by this physician? NO    Rooming Documentation:  Assigned questionnaire(s) completed      Reason for visit: No chief complaint on file.    Machelle CABEZASF

## 2024-08-13 NOTE — PROGRESS NOTES
[place] for Sexual [facility] - Progress Note    Date of Service: 24   Name: Javi Salmeron  : 1970  Medical Record Number: [id number]  Treating Provider: Rosalinda Avendano  Type of Session: Individual  Present in Session: pt  Session Start and Stop Time: 9091-0892  Number of Minutes:  46    SERVICE MODALITY:  Video Visit:      Provider verified identity through the following two step process.  Patient provided:  Patient is known previously to provider    Telemedicine Visit: The patient's condition can be safely assessed and treated via synchronous audio and visual telemedicine encounter.      Reason for Telemedicine Visit: Services only offered telehealth    Originating Site (Patient Location): Patient's home    Distant Site (Provider Location): [facility]    Consent:  The patient/guardian has verbally consented to: the potential risks and benefits of telemedicine (video visit) versus in person care; bill my insurance or make self-payment for services provided; and responsibility for payment of non-covered services.     Patient would like the video invitation sent by:  My Chart    Mode of Communication:  [facility]    Distant Location (Provider):  Off-site    As the provider I attest to compliance with applicable laws and regulations related to telemedicine.    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 Not having issues with CSB.     Progress Toward Treatment Goals:   Stable/Maintenance of progress     Therapeutic Interventions/Treatment Strategies:    Area(s) of treatment focus addressed in this session included Symptom Management and Interpersonal Relationship Skills    Date from hell-processed  Having sex w  chelly    Psychotherapist offered support, feedback and validation and reinforced use of skills Treatment modalities used include Solution Focused Therapy Interpersonal Relationship Skills: Assisted patients in implementing more effective communication skills in their relationships and discussed application of self compassion and explored comfort with sexual communication  Support, Feedback, and Problem Solving    Patient Response:   Patient responded to session by accepting feedback, listening, and accepting support  Possible barriers to participation / learning include: N/A    Current Mental Status Exam:   Appearance:  Appropriate   Eye Contact:  Good   Attitude / Demeanor: Cooperative   Speech      Rate / Production: Normal/ Responsive      Volume:  Normal  volume  Orientation:  All  Mood:   Anxious  Normal  Affect:   Appropriate   Thought Content: Clear   Insight:   Good       Plan/Need for Future Services:  Return for therapy in 4 weeks to treat diagnosed problems.    Patient has a current master individualized treatment plan.  See Epic treatment plan for more information.    Referral / Collaboration:  Referral to another professional/service is not indicated at this time..  Emergency Services Needed?  No    Assignment:  Will not reach out to chelly until she reaches out to me    Interactive Complexity:  There are four specific communication difficulties that complicate the work of the primary psychiatric procedure.  Interactive complexity (+76724) may be reported when at least one of these difficulties is present.    Communication difficulties present during current the psychiatric procedure include:  None.      Signature/Title:    Rosalinda Avendano      Answers submitted by the patient for this visit:  Patient Health Questionnaire (Submitted on 8/13/2024)  If you checked off any problems, how difficult have these problems made it for you to do your work, take care of things at home, or get along with other  people?: Not difficult at all  PHQ9 TOTAL SCORE: 0

## 2024-09-12 ENCOUNTER — VIRTUAL VISIT (OUTPATIENT)
Dept: PSYCHOLOGY | Facility: CLINIC | Age: 54
End: 2024-09-12
Payer: COMMERCIAL

## 2024-09-12 DIAGNOSIS — F91.8 CONDUCT DISORDER, UNDIFFERENTIATED TYPE: ICD-10-CM

## 2024-09-12 DIAGNOSIS — F34.1 DYSTHYMIC DISORDER: Primary | ICD-10-CM

## 2024-09-12 PROCEDURE — 90834 PSYTX W PT 45 MINUTES: CPT | Mod: 95 | Performed by: MARRIAGE & FAMILY THERAPIST

## 2024-09-12 NOTE — PROGRESS NOTES
"Virtual Visit Details    Type of service:  Video Visit     Originating Location (pt. Location): Home    Distant Location (provider location):  Off-site  Platform used for Video Visit: [facility] for Sexual and Gender Health - Progress Note    Date of Service: 24   Name: Javi Salmeron  : 1970  Medical Record Number: [id number]  Treating Provider: LARS Goncalves, NIA   Type of Session: Individual  Present in Session: pt  Session Start and Stop Time: 5677-0907  Number of Minutes:  45    SERVICE MODALITY:  Video Visit:      Provider verified identity through the following two step process.  Patient provided:  Patient is known previously to provider    Telemedicine Visit: The patient's condition can be safely assessed and treated via synchronous audio and visual telemedicine encounter.      Reason for Telemedicine Visit: Services only offered telehealth    Originating Site (Patient Location): Patient's home    Distant Site (Provider Location): [facility]    Consent:  The patient/guardian has verbally consented to: the potential risks and benefits of telemedicine (video visit) versus in person care; bill my insurance or make self-payment for services provided; and responsibility for payment of non-covered services.     Patient would like the video invitation sent by:  My Chart    Mode of Communication:  Video Conference via [facility]    Distant Location (Provider):  Off-site    As the provider I attest to compliance with applicable laws and regulations related to telemedicine.    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 Not having issues with CSB.     Progress Toward Treatment Goals:   Stable/Maintenance of progress "     Therapeutic Interventions/Treatment Strategies:    Area(s) of treatment focus addressed in this session included Symptom Management and Maintenance of Progress    Job offer-accepted the offer, then they offered it to someone else-feeling discouraged  Reached out to chase-had dinner, she told him she found someone else, let him know due to facebook posts, deleted all photos, numbers deprssed ever since  Work still sucks  Stuck w mendez and devonte    Psychotherapist offered support, feedback and validation and reinforced use of skills Treatment modalities used include Cognitive Behavioral Therapy Interpersonal Coping Skills: Facilitated understanding of  what factors may contribute to symptom relapse and skills plan to manage symptom relapse  and Relationship Skills: Assisted patients in implementing more effective communication skills in their relationships, Encouraged development and maintenance  of healthy boundaries, and Discussed strategies to promote healthier understanding of interpersonal relationships  Support and Feedback    Patient Response:   Patient responded to session by accepting feedback and accepting support  Possible barriers to participation / learning include: N/A    Current Mental Status Exam:   Appearance:  Appropriate   Eye Contact:  Good   Attitude / Demeanor: Cooperative   Speech      Rate / Production: Normal/ Responsive      Volume:  Normal  volume  Orientation:  All  Mood:   Depressed   Affect:   Appropriate  Subdued   Thought Content: Clear   Insight:   Good       Plan/Need for Future Services:  Return for therapy in 4 weeks to treat diagnosed problems.    Patient has a current master individualized treatment plan.  See Epic treatment plan for more information.    Referral / Collaboration:  Referral to another professional/service is not indicated at this time..  Emergency Services Needed?  No    Assignment:  Return in 4 weks     Interactive Complexity:  There are four specific  communication difficulties that complicate the work of the primary psychiatric procedure.  Interactive complexity (+86629) may be reported when at least one of these difficulties is present.    Communication difficulties present during current the psychiatric procedure include:  None.      Signature/Title:    Rosalinda Avendano, LARS, [facility]

## 2024-09-12 NOTE — NURSING NOTE
Current patient location: [address] [place] [address]  [place] [address]    Is the patient currently in the state of [place]? YES    Visit mode:VIDEO    If the visit is dropped, the patient can be reconnected by: VIDEO VISIT: Text to cell phone:   Telephone Information:   Mobile [phone]       Will anyone else be joining the visit? NO  (If patient encounters technical issues they should call [phone])    How would you like to obtain your AVS? MyChart    Are changes needed to the allergy or medication list? N/A    Are refills needed on medications prescribed by this physician? NO    Rooming Documentation:  Not applicable      Reason for visit: CHIQUIS GUERIN

## 2024-10-10 ENCOUNTER — OFFICE VISIT (OUTPATIENT)
Dept: PSYCHOLOGY | Facility: CLINIC | Age: 54
End: 2024-10-10
Payer: COMMERCIAL

## 2024-10-10 DIAGNOSIS — F91.8 CONDUCT DISORDER, UNDIFFERENTIATED TYPE: ICD-10-CM

## 2024-10-10 DIAGNOSIS — F34.1 DYSTHYMIC DISORDER: Primary | ICD-10-CM

## 2024-10-10 PROCEDURE — 90853 GROUP PSYCHOTHERAPY: CPT | Performed by: MARRIAGE & FAMILY THERAPIST

## 2024-10-10 NOTE — GROUP NOTE
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 Not having issues with CSB.     Progress Toward Treatment Goals:   Stable/Maintenance of progress     Therapeutic Interventions/Treatment Strategies:    Area(s) of treatment focus addressed in this session included Symptom Management and Maintenance of Progress  Patient checked in about their mental health symptoms, healthy coping skills used over the week, negative coping skills used over the week, what sexual behaviors they engaged in-fantasy, masturbation, sex, their comfort level with their behaviors, if there were triggers, urges to violate boundaries, and violations of boundaries.  They took time to process about past month they provided support and feedback to others in the group.    Psychotherapist offered support, feedback and validation and reinforced use of skills Treatment modalities used include [facility] THERAPY INTERVENTIONS: Group Dynamic Therapy  Interpersonal Coping Skills: Assisted patient in understanding the purpose of planning / creating / participating / sharing in positive experiences and Facilitated understanding of  what factors may contribute to symptom relapse and skills plan to manage symptom relapse  and discussed  application of self compassion  Support, Feedback, and Structured Activity    Patient Response:   Patient responded to session by accepting feedback, giving feedback, and listening  Possible barriers to participation / learning include: N/A    Current Mental Status Exam:   Appearance:  Appropriate   Eye Contact:  Good   Attitude / Demeanor: Cooperative   Speech      Rate / Production: Normal/ Responsive      Volume:  Normal  volume  Orientation:  All  Mood:   Anxious   Affect:   Appropriate   Thought Content: Clear   Insight:   Good       Plan/Need for Future Services:  Return for therapy in 4 weeks to treat diagnosed problems.    Patient has a current master individualized treatment plan.  See Epic treatment plan for more information.    Referral / Collaboration:  Referral to another professional/service is not indicated at this time..  Emergency Services Needed?  No    Assignment:  Return in one month    Interactive Complexity:  There are four specific communication difficulties that complicate the work of the primary psychiatric procedure.  Interactive complexity (+18653) may be reported when at least one of these difficulties is present.    Communication difficulties present during current the psychiatric procedure include:  None.      Signature/Title:    LARS Zuleta, [facility]

## 2024-10-17 ENCOUNTER — VIRTUAL VISIT (OUTPATIENT)
Dept: PSYCHOLOGY | Facility: CLINIC | Age: 54
End: 2024-10-17
Payer: COMMERCIAL

## 2024-10-17 DIAGNOSIS — F34.1 DYSTHYMIC DISORDER: Primary | ICD-10-CM

## 2024-10-17 DIAGNOSIS — F91.8 CONDUCT DISORDER, UNDIFFERENTIATED TYPE: ICD-10-CM

## 2024-10-17 PROCEDURE — 90834 PSYTX W PT 45 MINUTES: CPT | Mod: 95 | Performed by: MARRIAGE & FAMILY THERAPIST

## 2024-10-17 NOTE — PROGRESS NOTES
Virtual Visit Details    Type of service:  Video Visit     Originating Location (pt. Location): Home    Distant Location (provider location):  Off-site  Platform used for Video Visit: [facility] for Sexual and Gender Health - Progress Note    Date of Service: 10/17/24   Name: Javi Salmeron  : 1970  Medical Record Number: [id number]  Treating Provider: LARS Goncalves, NIA   Type of Session: Individual  Present in Session: pt  Session Start and Stop Time: 3088-4614  Number of Minutes:  50    SERVICE MODALITY:  Video Visit:      Provider verified identity through the following two step process.  Patient provided:  Patient is known previously to provider    Telemedicine Visit: The patient's condition can be safely assessed and treated via synchronous audio and visual telemedicine encounter.      Reason for Telemedicine Visit: Services only offered telehealth    Originating Site (Patient Location): Patient's home    Distant Site (Provider Location): [facility]    Consent:  The patient/guardian has verbally consented to: the potential risks and benefits of telemedicine (video visit) versus in person care; bill my insurance or make self-payment for services provided; and responsibility for payment of non-covered services.     Patient would like the video invitation sent by:  My Chart    Mode of Communication:  Video Conference via [facility]    Distant Location (Provider):  Off-site    As the provider I attest to compliance with applicable laws and regulations related to telemedicine.    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 Not having issues with CSB.     Progress Toward Treatment Goals:   Stable/Maintenance of progress      Therapeutic Interventions/Treatment Strategies:    Area(s) of treatment focus addressed in this session included Symptom Management, Interpersonal Relationship Skills, and Maintenance of Progress    Psychotherapist offered support, feedback and validation and reinforced use of skills Treatment modalities used include Cognitive Behavioral Therapy Interpersonal Relationship Skills: Discussed strategies to promote healthier understanding of interpersonal relationships and discussed application of self compassion and explored challenging unrealistic expectations of self/others  Support and Feedback    Patient Response:   Patient responded to session by listening and accepting support  Possible barriers to participation / learning include: N/A    Current Mental Status Exam:   Appearance:  Appropriate   Eye Contact:  Good   Attitude / Demeanor: Cooperative   Speech      Rate / Production: Normal/ Responsive      Volume:  Normal  volume  Orientation:  All  Mood:   Normal Euthymic  Affect:   Appropriate   Thought Content: Clear   Insight:   Good       Plan/Need for Future Services:  Return for therapy in 4 weeks to treat diagnosed problems.    Patient has a current master individualized treatment plan.  See Epic treatment plan for more information.    Referral / Collaboration:  Referral to another professional/service is not indicated at this time..  Emergency Services Needed?  No    Assignment:  Return in 4 weeks     Interactive Complexity:  There are four specific communication difficulties that complicate the work of the primary psychiatric procedure.  Interactive complexity (+23367) may be reported when at least one of these difficulties is present.    Communication difficulties present during current the psychiatric procedure include:  None.      Signature/Title:    Rosalinda Avendano, LMFT, [facility] [place]

## 2024-10-17 NOTE — NURSING NOTE
Is the patient currently in the state of [place]? YES    Current patient location: [address] [place] [address]  [place] [address]    Visit mode:VIDEO    If the visit is dropped, the patient can be reconnected by: VIDEO VISIT: Text to cell phone:   Telephone Information:   Mobile [phone]       Will anyone else be joining the visit? No  (If patient encounters technical issues they should call [phone])    Are changes needed to the allergy or medication list? N/A    Are refills needed on medications prescribed by this physician? No    Rooming Documentation: Questionnaire(s) not done per department protocol.    Reason for visit: RECHECK     APOORVA Mckinney

## 2024-10-18 DIAGNOSIS — E78.5 HYPERLIPIDEMIA LDL GOAL <100: ICD-10-CM

## 2024-10-18 RX ORDER — ATORVASTATIN CALCIUM 10 MG/1
TABLET, FILM COATED ORAL
Qty: 90 TABLET | Refills: 2 | Status: SHIPPED | OUTPATIENT
Start: 2024-10-18

## 2024-10-18 NOTE — TELEPHONE ENCOUNTER
Medication requested: atorvastatin (LIPITOR) 10 MG tablet   Last office visit: 4/5/24  [facility] appointments: none  Medication last refilled: 7/5/24; 90 + 0 refills  Last qualifying labs:   Component      Latest Ref Rng 3/13/2023  8:30 AM   Cholesterol      <200 mg/dL 156    Triglycerides      <150 mg/dL 87    HDL Cholesterol      >=40 mg/dL 41    LDL Cholesterol Calculated      <=100 mg/dL 98    Non HDL Cholesterol      <130 mg/dL 115      Routing refill request to provider for review/approval because:  Labs not current:  lipid panel    Lauro PIERCE, RN  10/18/24 2:41 PM

## 2024-10-27 DIAGNOSIS — F32.A DEPRESSION, UNSPECIFIED DEPRESSION TYPE: ICD-10-CM

## 2024-10-28 RX ORDER — CITALOPRAM HYDROBROMIDE 40 MG/1
40 TABLET ORAL EVERY MORNING
Qty: 30 TABLET | Refills: 0 | Status: SHIPPED | OUTPATIENT
Start: 2024-10-28

## 2024-10-28 NOTE — TELEPHONE ENCOUNTER
Medication requested: citalopram (CELEXA) 40 MG tablet   Last office visit: 4/5/24  [facility] appointments: none  Medication last refilled: 7/5/24; 90 + 0 refills  Last qualifying labs:    8/13/2024  9:51 AM   PHQ-9 / RAGINI-7 Scores 8/2015 to present    PHQ-9 Score DocFlow 0       6/19/2023  4:54 PM   PHQ-9 / RAGINI-7 Scores 8/2015 to present    RAGINI-7 Score DocFlow 1      Medication is being filled for 1 time refill only due to:  Patient needs to be seen because due for mental health follow-up appt.    Lauro PIERCE, RN  10/28/24 11:21 AM

## 2024-11-07 ENCOUNTER — OFFICE VISIT (OUTPATIENT)
Dept: PSYCHOLOGY | Facility: CLINIC | Age: 54
End: 2024-11-07
Payer: COMMERCIAL

## 2024-11-07 DIAGNOSIS — F34.1 DYSTHYMIC DISORDER: Primary | ICD-10-CM

## 2024-11-07 DIAGNOSIS — F91.8 CONDUCT DISORDER, UNDIFFERENTIATED TYPE: ICD-10-CM

## 2024-11-08 NOTE — GROUP NOTE
[facility]  [address], [address]  [place]  [address]    Group Progress Note  [facility] - Progress Note    Date of Service: 24   Name: Javi Salmeron  : 1970  Medical Record Number: [id number]  START TIME:  4:30 PM  END TIME:  6:30 PM  FACILITATOR(S): Rosalinda Avendano, LMFT, [facility]  TOPIC: [facility] Group Therapy  Type of Session: Group  :  Number of Attendees:  5  Number of Minutes:  /120    SERVICE MODALITY:  In-person      DSM-5 Diagnoses:  300.4 Dysthymia  312.89 (F91.8) Other Specified Disruptive, Impulse Control, and Conduct Disorder (Hypersexual Disorder)     Current Reported Symptoms and Status update:  Changes since last session-reports month has been difficult  Depression reasonably well managed. Not having issues with CSB.     Progress Toward Treatment Goals:   Stable/Maintenance of progress     Therapeutic Interventions/Treatment Strategies:    Area(s) of treatment focus addressed in this session included Symptom Management and Maintenance of Progress    Patient checked in about their mental health symptoms, healthy coping skills used over the week, negative coping skills used over the week, what sexual behaviors they engaged in-fantasy, masturbation, sex, their comfort level with their behaviors, if there were triggers, urges to violate boundaries, and violations of boundaries.  They took time to process about past month and ex getting  they provided support and feedback to others in the group.    Psychotherapist offered support, feedback and validation and reinforced use of skills Treatment modalities used include [facility] THERAPY INTERVENTIONS: Group Dynamic Therapy  Interpersonal Other: Explored with patient how life transitions may impact mental health and functioning  and Discussed ways to increase hopefulness  Support, Feedback, and Structured Activity    Patient Response:   Patient responded to session by accepting feedback,  giving feedback, and listening  Possible barriers to participation / learning include: N/A    Current Mental Status Exam:   Appearance:  Appropriate   Eye Contact:  Good   Attitude / Demeanor: Cooperative   Speech      Rate / Production: Normal/ Responsive      Volume:  Normal  volume  Orientation:  All  Mood:   Depressed   Affect:   Appropriate   Thought Content: Clear   Insight:   Good       Plan/Need for Future Services:  Return for therapy in 4 weeks to treat diagnosed problems.    Patient has a current master individualized treatment plan.  See Epic treatment plan for more information.    Referral / Collaboration:  Referral to another professional/service is not indicated at this time..  Emergency Services Needed?  No    Assignment:  Return in one month    Interactive Complexity:  There are four specific communication difficulties that complicate the work of the primary psychiatric procedure.  Interactive complexity (+51880) may be reported when at least one of these difficulties is present.    Communication difficulties present during current the psychiatric procedure include:  None.      Signature/Title:    LARS Zuleta, [facility]

## 2024-11-21 ENCOUNTER — VIRTUAL VISIT (OUTPATIENT)
Dept: PSYCHOLOGY | Facility: CLINIC | Age: 54
End: 2024-11-21
Payer: COMMERCIAL

## 2024-11-21 DIAGNOSIS — F91.8 CONDUCT DISORDER, UNDIFFERENTIATED TYPE: ICD-10-CM

## 2024-11-21 DIAGNOSIS — F34.1 DYSTHYMIC DISORDER: Primary | ICD-10-CM

## 2024-11-21 NOTE — PROGRESS NOTES
Virtual Visit Details    Type of service:  Video Visit     Originating Location (pt. Location): Home    Distant Location (provider location):  Off-site  Platform used for Video Visit: [facility] - Progress Note    Date of Service: 24   Name: Javi Salmeron  : 1970  Medical Record Number: [id number]  Treating Provider: LARS Goncalves, NIA   Type of Session: Individual  Present in Session: pt  Session Start and Stop Time: 5917-7889  Number of Minutes:  49    SERVICE MODALITY:  Video Visit:      Provider verified identity through the following two step process.  Patient provided:  Patient is known previously to provider    Telemedicine Visit: The patient's condition can be safely assessed and treated via synchronous audio and visual telemedicine encounter.      Reason for Telemedicine Visit: Services only offered telehealth    Originating Site (Patient Location): Patient's home    Distant Site (Provider Location): [facility]    Consent:  The patient/guardian has verbally consented to: the potential risks and benefits of telemedicine (video visit) versus in person care; bill my insurance or make self-payment for services provided; and responsibility for payment of non-covered services.     Patient would like the video invitation sent by:  My Chart    Mode of Communication:  Video Conference via [facility]    Distant Location (Provider):  Off-site    As the provider I attest to compliance with applicable laws and regulations related to telemedicine.    DSM-5 Diagnoses:  300.4 Dysthymia  312.89 (F91.8) Other Specified Disruptive, Impulse Control, and Conduct Disorder (Hypersexual Disorder)     Current Reported Symptoms and Status update:  Changes since last session-things going ok, work is really stressful   Depression reasonably well managed. Not having issues with CSB.     Progress Toward Treatment Goals:   Stable/Maintenance of  progress     Therapeutic Interventions/Treatment Strategies:    Area(s) of treatment focus addressed in this session included Symptom Management, Interpersonal Relationship Skills, Sexual Health and Wellness, and Maintenance of Progress    Has been doing more social things-trivia mafia, praise and Jehovah's witness at [employer],     Psychotherapist offered support, feedback and validation and reinforced use of skills Treatment modalities used include Cognitive Behavioral Therapy Interpersonal Relationship Skills: Assisted patients in implementing more effective communication skills in their relationships and discussed application of self compassion, explored challenging unrealistic expectations of self/others, and explored comfort with sexual communication  Support, Feedback, and Clarification    Patient Response:   Patient responded to session by accepting feedback, listening, and accepting support  Possible barriers to participation / learning include: N/A    Current Mental Status Exam:   Appearance:  Appropriate   Eye Contact:  Good   Attitude / Demeanor: Cooperative   Speech      Rate / Production: Normal/ Responsive      Volume:  Normal  volume  Orientation:  All  Mood:   Depressed  Normal  Affect:   Appropriate   Thought Content: Clear   Insight:   Good       Plan/Need for Future Services:  Return for therapy in 4 weeks to treat diagnosed problems.    Patient has a current master individualized treatment plan.  See Epic treatment plan for more information.    Referral / Collaboration:  Referral to another professional/service is not indicated at this time..  Emergency Services Needed?  No    Assignment:  Continue w self care  Allowing rest  Connection     Interactive Complexity:  There are four specific communication difficulties that complicate the work of the primary psychiatric procedure.  Interactive complexity (+89290) may be reported when at least one of these difficulties is present.    Communication difficulties  present during current the psychiatric procedure include:  None.      Signature/Title:    Rosalinda Avendano, LMFT, [facility]

## 2024-11-21 NOTE — NURSING NOTE
Is the patient currently in the state of [place]? YES    Current patient location: [address] [place] [address]  [place] [address]    Visit mode:VIDEO    If the visit is dropped, the patient can be reconnected by: VIDEO VISIT: Text to cell phone:   Telephone Information:   Mobile [phone]       Will anyone else be joining the visit? No  (If patient encounters technical issues they should call [phone])    Are changes needed to the allergy or medication list? N/A    Are refills needed on medications prescribed by this physician? No    Rooming Documentation: Questionnaire(s) not done per department protocol.    Reason for visit: RECHECK     APOORVA Mckinney

## 2024-11-25 ENCOUNTER — OFFICE VISIT (OUTPATIENT)
Dept: FAMILY MEDICINE | Facility: CLINIC | Age: 54
End: 2024-11-25
Payer: COMMERCIAL

## 2024-11-25 VITALS
TEMPERATURE: 97.7 F | HEIGHT: 72 IN | SYSTOLIC BLOOD PRESSURE: 132 MMHG | RESPIRATION RATE: 16 BRPM | HEART RATE: 73 BPM | OXYGEN SATURATION: 98 % | WEIGHT: 203 LBS | BODY MASS INDEX: 27.5 KG/M2 | DIASTOLIC BLOOD PRESSURE: 75 MMHG

## 2024-11-25 DIAGNOSIS — Z23 HIGH PRIORITY FOR 2019-NCOV VACCINE: ICD-10-CM

## 2024-11-25 DIAGNOSIS — I10 HYPERTENSION, UNSPECIFIED TYPE: ICD-10-CM

## 2024-11-25 DIAGNOSIS — I10 ESSENTIAL HYPERTENSION, BENIGN: ICD-10-CM

## 2024-11-25 DIAGNOSIS — F32.A DEPRESSION, UNSPECIFIED DEPRESSION TYPE: ICD-10-CM

## 2024-11-25 DIAGNOSIS — E78.5 HYPERLIPIDEMIA LDL GOAL <100: ICD-10-CM

## 2024-11-25 DIAGNOSIS — Z12.5 SCREENING FOR PROSTATE CANCER: ICD-10-CM

## 2024-11-25 DIAGNOSIS — Z13.1 SCREENING FOR DIABETES MELLITUS: ICD-10-CM

## 2024-11-25 DIAGNOSIS — F33.41 RECURRENT MAJOR DEPRESSIVE DISORDER, IN PARTIAL REMISSION (H): ICD-10-CM

## 2024-11-25 DIAGNOSIS — Z23 NEED FOR PROPHYLACTIC VACCINATION AND INOCULATION AGAINST INFLUENZA: ICD-10-CM

## 2024-11-25 DIAGNOSIS — Z00.00 ANNUAL PHYSICAL EXAM: Primary | ICD-10-CM

## 2024-11-25 DIAGNOSIS — Z11.3 SCREENING FOR STDS (SEXUALLY TRANSMITTED DISEASES): ICD-10-CM

## 2024-11-25 DIAGNOSIS — Q74.2 CONGENITAL DEFORMITY OF LEFT LOWER EXTREMITY: ICD-10-CM

## 2024-11-25 LAB
EST. AVERAGE GLUCOSE BLD GHB EST-MCNC: 114 MG/DL
HBA1C MFR BLD: 5.6 % (ref 0–5.6)

## 2024-11-25 PROCEDURE — 84520 ASSAY OF UREA NITROGEN: CPT | Performed by: FAMILY MEDICINE

## 2024-11-25 PROCEDURE — 82435 ASSAY OF BLOOD CHLORIDE: CPT | Performed by: FAMILY MEDICINE

## 2024-11-25 PROCEDURE — G0103 PSA SCREENING: HCPCS | Performed by: FAMILY MEDICINE

## 2024-11-25 PROCEDURE — 82465 ASSAY BLD/SERUM CHOLESTEROL: CPT | Performed by: FAMILY MEDICINE

## 2024-11-25 PROCEDURE — 86780 TREPONEMA PALLIDUM: CPT | Performed by: FAMILY MEDICINE

## 2024-11-25 PROCEDURE — 87389 HIV-1 AG W/HIV-1&-2 AB AG IA: CPT | Performed by: FAMILY MEDICINE

## 2024-11-25 RX ORDER — AMLODIPINE BESYLATE 10 MG/1
10 TABLET ORAL DAILY
Qty: 90 TABLET | Refills: 3 | Status: SHIPPED | OUTPATIENT
Start: 2024-11-25

## 2024-11-25 RX ORDER — CITALOPRAM HYDROBROMIDE 40 MG/1
40 TABLET ORAL EVERY MORNING
Qty: 90 TABLET | Refills: 3 | Status: SHIPPED | OUTPATIENT
Start: 2024-11-25

## 2024-11-25 SDOH — HEALTH STABILITY: PHYSICAL HEALTH: ON AVERAGE, HOW MANY DAYS PER WEEK DO YOU ENGAGE IN MODERATE TO STRENUOUS EXERCISE (LIKE A BRISK WALK)?: 1 DAY

## 2024-11-25 ASSESSMENT — PATIENT HEALTH QUESTIONNAIRE - PHQ9
10. IF YOU CHECKED OFF ANY PROBLEMS, HOW DIFFICULT HAVE THESE PROBLEMS MADE IT FOR YOU TO DO YOUR WORK, TAKE CARE OF THINGS AT HOME, OR GET ALONG WITH OTHER PEOPLE: NOT DIFFICULT AT ALL
SUM OF ALL RESPONSES TO PHQ QUESTIONS 1-9: 2
SUM OF ALL RESPONSES TO PHQ QUESTIONS 1-9: 2

## 2024-11-25 ASSESSMENT — ANXIETY QUESTIONNAIRES
4. TROUBLE RELAXING: NOT AT ALL
IF YOU CHECKED OFF ANY PROBLEMS ON THIS QUESTIONNAIRE, HOW DIFFICULT HAVE THESE PROBLEMS MADE IT FOR YOU TO DO YOUR WORK, TAKE CARE OF THINGS AT HOME, OR GET ALONG WITH OTHER PEOPLE: NOT DIFFICULT AT ALL
GAD7 TOTAL SCORE: 0
7. FEELING AFRAID AS IF SOMETHING AWFUL MIGHT HAPPEN: NOT AT ALL
5. BEING SO RESTLESS THAT IT IS HARD TO SIT STILL: NOT AT ALL
8. IF YOU CHECKED OFF ANY PROBLEMS, HOW DIFFICULT HAVE THESE MADE IT FOR YOU TO DO YOUR WORK, TAKE CARE OF THINGS AT HOME, OR GET ALONG WITH OTHER PEOPLE?: NOT DIFFICULT AT ALL
GAD7 TOTAL SCORE: 0
1. FEELING NERVOUS, ANXIOUS, OR ON EDGE: NOT AT ALL
6. BECOMING EASILY ANNOYED OR IRRITABLE: NOT AT ALL
7. FEELING AFRAID AS IF SOMETHING AWFUL MIGHT HAPPEN: NOT AT ALL
GAD7 TOTAL SCORE: 0
3. WORRYING TOO MUCH ABOUT DIFFERENT THINGS: NOT AT ALL
2. NOT BEING ABLE TO STOP OR CONTROL WORRYING: NOT AT ALL

## 2024-11-25 ASSESSMENT — SOCIAL DETERMINANTS OF HEALTH (SDOH): HOW OFTEN DO YOU GET TOGETHER WITH FRIENDS OR RELATIVES?: THREE TIMES A WEEK

## 2024-11-25 NOTE — PROGRESS NOTES
Preventive Care Visit  [facility]      ASSESSMENT/PLAN:    Annual Exam/Preventive Issues   - Labs: Check Lipids, BMP, PSA and A1C  - Immunizations: Give Covid and Flu vaccines  - Cancer screenings:   Reviewed colonoscopy from 2022 with recommendation to repeat in 2027      -Specific concerns:     Hypertension, under good control on Amlodipine and hctz  Depression, stable. 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 Will send for an Ultrasound to assure no blood clot. Call [phone] or [phone] to schedule imaging tests at the [facility].   Also, will screen for STDs. No symptoms.       -Follow up: in a year or sooner as needed    Mt Becker MD  Family Medicine and Sports Medicine      INTRODUCTION:     Javi's here for an annual preventive exam.  Last annual was 3 years ago.       He is doing well.  Still working at the bank.  He is no longer with his partner.  His 3 children are doing well.    Current Outpatient Medications   Medication Sig Dispense Refill    amLODIPine (NORVASC) 10 MG tablet TAKE 1 TABLET(10 MG) BY MOUTH DAILY 90 tablet 1    atorvastatin (LIPITOR) 10 MG tablet TAKE 1 TABLET BY MOUTH DAILY 90 tablet 2    azithromycin (ZITHROMAX) 500 MG tablet With travel diarrhea, Take 1 and then repeat in a 12 hours. Repeat again if needed. 3 tablet 0    citalopram (CELEXA) 40 MG tablet TAKE 1 TABLET(40 MG) BY MOUTH EVERY MORNING 30 tablet 0    hydrochlorothiazide (HYDRODIURIL) 25 MG tablet Take 1 tablet (25 mg) by mouth daily 90 tablet 3    naltrexone (DEPADE/REVIA) 50 MG tablet Take 1 tablet (50 mg) by mouth daily 90 tablet 3       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)     Major depression in  complete remission (H)    Hypertension       Social History     Social History Narrative    Lives in [place].    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     since about 2013.         3 sons,: Oldest b 2001, and Twins b 2004.          Health Care Directive  Patient does not have a Health Care Directive: Discussed advance care planning with patient; information given to patient to review.      11/25/2024   General Health   How would you rate your overall physical health? Good   Feel stress (tense, anxious, or unable to sleep) Not at all            11/25/2024   Nutrition   Three or more servings of calcium each day? (!) NO   Diet: Regular (no restrictions)   How many servings of fruit and vegetables per day? (!) 2-3   How many sweetened beverages each day? (!) 2            11/25/2024   Exercise   Days per week of moderate/strenous exercise 1 day      (!) EXERCISE CONCERN      11/25/2024   Social Factors   Frequency of gathering with friends or relatives Three times a week   Worry food won't last until get money to buy more No   Food not last or not have enough money for food? No   Do you have housing? (Housing is defined as stable permanent housing and does not include staying ouside in a car, in a tent, in an abandoned building, in an overnight shelter, or couch-surfing.) Yes   Are you worried about losing your housing? No   Lack of transportation? No   Unable to get utilities (heat,electricity)? No            11/25/2024   Fall Risk   Fallen 2 or more times in the past year? No    Trouble with walking or balance? No        Patient-reported          11/25/2024   Dental   Dentist two times every year? Yes             Today's PHQ-9 Score:       11/25/2024     4:16 PM   PHQ-9 SCORE   PHQ-9 Total Score MyChart 2 (Minimal depression)   PHQ-9 Total Score 2        Patient-reported         11/25/2024   Substance Use   Alcohol more than 3/day or more than 7/wk No   Do you use any other substances recreationally?  No        Social History     Tobacco Use    Smoking status: Never    Smokeless tobacco: Never   Substance Use Topics    Alcohol use: Yes     Comment: one or two a month    Drug use: No           11/25/2024   STI Screening   New sexual partner(s) since last STI/HIV test? (!) YES.      Last PSA:   Prostate Specific Antigen Screen   Date Value Ref Range Status   03/13/2023 0.85 0.00 - 3.50 ng/mL Final     ASCVD Risk   The 10-year ASCVD risk score (Macrina AGUILAR, et al., 2019) is: 6.8%    Values used to calculate the score:      Age: 54 years      Sex: Male      Is Non- : No      Diabetic: No      Tobacco smoker: No      Systolic Blood Pressure: 132 mmHg      Is BP treated: Yes      HDL Cholesterol: 41 mg/dL      Total Cholesterol: 156 mg/dL         Reviewed and updated as needed this visit by Provider                  Objective    Exam  /75 (BP Location: Left arm, Patient Position: Sitting, Cuff Size: Adult Large)   Pulse 73   Temp 97.7  F (36.5  C) (Skin)   Resp 16   Ht 1.829 m (6')   Wt 92.1 kg (203 lb)   SpO2 98%   BMI 27.53 kg/m     Estimated body mass index is 27.53 kg/m  as calculated from the following:    Height as of this encounter: 1.829 m (6').    Weight as of this encounter: 92.1 kg (203 lb).    Physical Exam  GENERAL: alert and no distress  EYES: Eyes grossly normal to inspection  HENT: ear canals and TM's normal, nose and mouth without ulcers or lesions  NECK: no adenopathy, no asymmetry, masses, or scars  RESP: lungs clear to auscultation - no rales, rhonchi or wheezes  CV: regular rate and rhythm, normal S1 S2, no S3 or S4, no murmur, click or rub, no peripheral edema  ABDOMEN: soft, nontender, no hepatosplenomegaly, no masses and bowel sounds normal  MS: His LEFT lower leg is weak especially below the knee. Minimal dorsiflexion strength in the ankle or strength in the foot.   SKIN: no suspicious lesions or rashes  NEURO: Normal strength and tone, mentation  intact and speech normal  PSYCH: mentation appears normal, affect normal/bright        Signed Electronically by: Mt Becker MD    Answers submitted by the patient for this visit:  Patient Health Questionnaire (Submitted on 11/25/2024)  If you checked off any problems, how difficult have these problems made it for you to do your work, take care of things at home, or get along with other people?: Not difficult at all  PHQ9 TOTAL SCORE: 2  Patient Health Questionnaire (G7) (Submitted on 11/25/2024)  RAGINI 7 TOTAL SCORE: 0

## 2024-11-25 NOTE — NURSING NOTE
54 year old  Chief Complaint   Patient presents with    Physical     No concerns.     Imm/Inj     Flu Shot    Imm/Inj     COVID-19 VACCINE       Blood pressure 132/75, pulse 73, temperature 97.7  F (36.5  C), temperature source Skin, resp. rate 16, height 1.829 m (6'), weight 92.1 kg (203 lb), SpO2 98%. Body mass index is 27.53 kg/m .  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)     Major depression in complete remission (H)       Wt Readings from Last 2 Encounters:   11/25/24 92.1 kg (203 lb)   04/05/24 90.3 kg (199 lb)     BP Readings from Last 3 Encounters:   11/25/24 132/75   04/05/24 126/74   01/03/24 (!) 145/91         Current Outpatient Medications   Medication Sig Dispense Refill    amLODIPine (NORVASC) 10 MG tablet TAKE 1 TABLET(10 MG) BY MOUTH DAILY 90 tablet 1    atorvastatin (LIPITOR) 10 MG tablet TAKE 1 TABLET BY MOUTH DAILY 90 tablet 2    azithromycin (ZITHROMAX) 500 MG tablet With travel diarrhea, Take 1 and then repeat in a 12 hours. Repeat again if needed. 3 tablet 0    citalopram (CELEXA) 40 MG tablet TAKE 1 TABLET(40 MG) BY MOUTH EVERY MORNING 30 tablet 0    hydrochlorothiazide (HYDRODIURIL) 25 MG tablet Take 1 tablet (25 mg) by mouth daily 90 tablet 3    naltrexone (DEPADE/REVIA) 50 MG tablet Take 1 tablet (50 mg) by mouth daily 90 tablet 3     No current facility-administered medications for this visit.       Social History     Tobacco Use    Smoking status: Never    Smokeless tobacco: Never   Substance Use Topics    Alcohol use: Yes     Comment: one or two a month    Drug use: No       Health Maintenance Due   Topic Date Due    ADVANCE CARE PLANNING  Never done    DEPRESSION ACTION PLAN  Never done    HEPATITIS B IMMUNIZATION (1 of 3 - 19+ 3-dose series) Never done    MENTAL HEALTH TX  "PLAN  09/04/2022    YEARLY PREVENTIVE VISIT  11/15/2022    BMP  03/13/2024    LIPID  03/13/2024    INFLUENZA VACCINE (1) 09/01/2024    COVID-19 Vaccine (5 - 2024-25 season) 09/01/2024       No results found for: \"PAP\"      November 25, 2024 4:25 PM   "

## 2024-11-25 NOTE — PATIENT INSTRUCTIONS
ASSESSMENT/PLAN:    Annual Exam/Preventive Issues   - Labs: Check Lipids, BMP, PSA and A1C  - Immunizations: Give Covid and Flu vaccines  - Cancer screenings:   Reviewed colonoscopy from 2022 with recommendation to repeat in 2027    -Specific concerns:     Hypertension, under good control on Amlodipine and hctz  Depression, stable. 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 Will send for an Ultrasound to assure no blood clot. Call [phone] or [phone] to schedule imaging tests at the [facility].       -Follow up: in a year or sooner as needed    Mt Becker MD  Family Medicine and Sports Medicine

## 2024-11-26 ENCOUNTER — LAB (OUTPATIENT)
Dept: LAB | Facility: CLINIC | Age: 54
End: 2024-11-26
Payer: COMMERCIAL

## 2024-11-26 DIAGNOSIS — Z11.3 SCREENING FOR STDS (SEXUALLY TRANSMITTED DISEASES): ICD-10-CM

## 2024-11-26 LAB
ANION GAP SERPL CALCULATED.3IONS-SCNC: 11 MMOL/L (ref 7–15)
BUN SERPL-MCNC: 21.9 MG/DL (ref 6–20)
C TRACH DNA SPEC QL PROBE+SIG AMP: NEGATIVE
CALCIUM SERPL-MCNC: 9.5 MG/DL (ref 8.8–10.4)
CHLORIDE SERPL-SCNC: 103 MMOL/L (ref 98–107)
CHOLEST SERPL-MCNC: 165 MG/DL
CREAT SERPL-MCNC: 1.07 MG/DL (ref 0.67–1.17)
EGFRCR SERPLBLD CKD-EPI 2021: 81 ML/MIN/1.73M2
FASTING STATUS PATIENT QL REPORTED: NO
FASTING STATUS PATIENT QL REPORTED: NO
GLUCOSE SERPL-MCNC: 91 MG/DL (ref 70–99)
HCO3 SERPL-SCNC: 27 MMOL/L (ref 22–29)
HDLC SERPL-MCNC: 40 MG/DL
HIV 1+2 AB+HIV1 P24 AG SERPL QL IA: NONREACTIVE
LDLC SERPL CALC-MCNC: 97 MG/DL
N GONORRHOEA DNA SPEC QL NAA+PROBE: NEGATIVE
NONHDLC SERPL-MCNC: 125 MG/DL
POTASSIUM SERPL-SCNC: 3.9 MMOL/L (ref 3.4–5.3)
PSA SERPL DL<=0.01 NG/ML-MCNC: 0.91 NG/ML (ref 0–3.5)
SODIUM SERPL-SCNC: 141 MMOL/L (ref 135–145)
T PALLIDUM AB SER QL: NONREACTIVE
TRIGL SERPL-MCNC: 139 MG/DL

## 2024-11-26 PROCEDURE — 87491 CHLMYD TRACH DNA AMP PROBE: CPT

## 2024-12-18 ASSESSMENT — PATIENT HEALTH QUESTIONNAIRE - PHQ9
SUM OF ALL RESPONSES TO PHQ QUESTIONS 1-9: 2
SUM OF ALL RESPONSES TO PHQ QUESTIONS 1-9: 2
10. IF YOU CHECKED OFF ANY PROBLEMS, HOW DIFFICULT HAVE THESE PROBLEMS MADE IT FOR YOU TO DO YOUR WORK, TAKE CARE OF THINGS AT HOME, OR GET ALONG WITH OTHER PEOPLE: NOT DIFFICULT AT ALL

## 2024-12-19 ENCOUNTER — VIRTUAL VISIT (OUTPATIENT)
Dept: PSYCHOLOGY | Facility: CLINIC | Age: 54
End: 2024-12-19
Payer: COMMERCIAL

## 2024-12-19 DIAGNOSIS — F34.1 DYSTHYMIC DISORDER: Primary | ICD-10-CM

## 2024-12-19 DIAGNOSIS — F91.8 CONDUCT DISORDER, UNDIFFERENTIATED TYPE: ICD-10-CM

## 2024-12-19 NOTE — NURSING NOTE
Current patient location: [address] [place] [address]  [place] [address]    Is the patient currently in the state of [place]? YES    Visit mode:VIDEO    If the visit is dropped, the patient can be reconnected by:VIDEO VISIT: Text to cell phone:   Telephone Information:   Mobile [phone]       Will anyone else be joining the visit? NO  (If patient encounters technical issues they should call [phone]    Are changes needed to the allergy or medication list? N/A    Are refills needed on medications prescribed by this physician? NO    Rooming Documentation:  Not applicable    Reason for visit: CHIQUIS GUERIN

## 2024-12-19 NOTE — PROGRESS NOTES
Virtual Visit Details    Type of service:  Video Visit     Originating Location (pt. Location): Home    Distant Location (provider location):  Off-site  Platform used for Video Visit: [facility] for Sexual and Gender Health - Progress Note    Date of Service: 24   Name: Javi Salmeron  : 1970  Medical Record Number: [id number]  Treating Provider: LARS Goncalves, NIA   Type of Session: Individual  Present in Session: pt  Session Start and Stop Time: 4564-6323  Number of Minutes:  51    SERVICE MODALITY:  Video Visit:      Provider verified identity through the following two step process.  Patient provided:  Patient is known previously to provider    Telemedicine Visit: The patient's condition can be safely assessed and treated via synchronous audio and visual telemedicine encounter.      Reason for Telemedicine Visit: Services only offered telehealth    Originating Site (Patient Location): Patient's home    Distant Site (Provider Location): [facility]    Consent:  The patient/guardian has verbally consented to: the potential risks and benefits of telemedicine (video visit) versus in person care; bill my insurance or make self-payment for services provided; and responsibility for payment of non-covered services.     Patient would like the video invitation sent by:  My Chart    Mode of Communication:  Video Conference via [facility]    Distant Location (Provider):  Off-site    As the provider I attest to compliance with applicable laws and regulations related to telemedicine.    DSM-5 Diagnoses:  300.4 Dysthymia  312.89 (F91.8) Other Specified Disruptive, Impulse Control, and Conduct Disorder (Hypersexual Disorder)     Current Reported Symptoms and Status update:  Changes since last session-lots of changes w work   Depression reasonably well managed. Not having issues with CSB.     Progress Toward Treatment Goals:   Stable/Maintenance of progress      Therapeutic Interventions/Treatment Strategies:    Area(s) of treatment focus addressed in this session included Symptom Management, Interpersonal Relationship Skills, and Maintenance of Progress    Psychotherapist offered support, feedback and validation and reinforced use of skills Treatment modalities used include Cognitive Behavioral Therapy Interpersonal Relationship Skills: Assisted patients in implementing more effective communication skills in their relationships and discussed application of self compassion, explored challenging unrealistic expectations of self/others, and explored personal values  Support, Feedback, Problem Solving, and Education    Patient Response:   Patient responded to session by accepting feedback, accepting support, verbalizing understanding, and actively engaged  Possible barriers to participation / learning include: N/A    Current Mental Status Exam:   Appearance:  Appropriate   Eye Contact:  Good   Attitude / Demeanor: Cooperative   Speech      Rate / Production: Normal/ Responsive      Volume:  Normal  volume  Orientation:  All  Mood:   Normal Euthymic  Affect:   Appropriate   Thought Content: Clear   Insight:   Good       Plan/Need for Future Services:  Return for therapy in 4 weeks to treat diagnosed problems.    Patient has a current master individualized treatment plan.  See Epic treatment plan for more information.    Referral / Collaboration:  Referral to another professional/service is not indicated at this time..  Emergency Services Needed?  No    Assignment:  Return to group-continue thinking about purpose    Interactive Complexity:  There are four specific communication difficulties that complicate the work of the primary psychiatric procedure.  Interactive complexity (+35327) may be reported when at least one of these difficulties is present.    Communication difficulties present during current the psychiatric procedure  include:  None.      Signature/Title:    Rosalinda Avendano, LMFT, [facility]

## 2025-01-16 ENCOUNTER — VIRTUAL VISIT (OUTPATIENT)
Dept: PSYCHOLOGY | Facility: CLINIC | Age: 55
End: 2025-01-16
Payer: COMMERCIAL

## 2025-01-16 DIAGNOSIS — F34.1 DYSTHYMIC DISORDER: Primary | ICD-10-CM

## 2025-01-16 DIAGNOSIS — F91.8 CONDUCT DISORDER, UNDIFFERENTIATED TYPE: ICD-10-CM

## 2025-01-16 NOTE — PROGRESS NOTES
Virtual Visit Details    Type of service:  Video Visit     Originating Location (pt. Location): Home    Distant Location (provider location):  On-site  Platform used for Video Visit: [facility] - Progress Note    Date of Service: 25   Name: Javi Salmeron  : 1970  Medical Record Number: [id number]  Treating Provider: LARS Goncalves, NIA   Type of Session: Individual  Present in Session: pt  Session Start and Stop Time: 1036-9828  Number of Minutes:  43    SERVICE MODALITY:  Video Visit:      Provider verified identity through the following two step process.  Patient provided:  Patient is known previously to provider    Telemedicine Visit: The patient's condition can be safely assessed and treated via synchronous audio and visual telemedicine encounter.      Reason for Telemedicine Visit: Services only offered telehealth    Originating Site (Patient Location): Patient's home    Distant Site (Provider Location): [facility]    Consent:  The patient/guardian has verbally consented to: the potential risks and benefits of telemedicine (video visit) versus in person care; bill my insurance or make self-payment for services provided; and responsibility for payment of non-covered services.     Patient would like the video invitation sent by:  My Chart    Mode of Communication:  Video Conference via [facility]    Distant Location (Provider):  On-site    As the provider I attest to compliance with applicable laws and regulations related to telemedicine.    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 Not having issues with CSB.     Progress Toward Treatment Goals:   Stable/Maintenance of  progress     Therapeutic Interventions/Treatment Strategies:    Area(s) of treatment focus addressed in this session included Symptom Management, Interpersonal Relationship Skills, and Maintenance of Progress    Psychotherapist offered support, feedback and validation and reinforced use of skills Treatment modalities used include Solution Focused Therapy Interpersonal Relationship Skills: Assisted patients in implementing more effective communication skills in their relationships and Discussed strategies to promote healthier understanding of interpersonal relationships and discussed application of self compassion, explored challenging unrealistic expectations of self/others, and explored comfort with sexual communication  Support and Feedback    Patient Response:   Patient responded to session by accepting feedback, accepting support, and actively engaged  Possible barriers to participation / learning include: N/A    Current Mental Status Exam:   Appearance:  Appropriate   Eye Contact:  Good   Attitude / Demeanor: Cooperative   Speech      Rate / Production: Normal/ Responsive      Volume:  Normal  volume  Orientation:  All  Mood:   Normal Euthymic  Affect:   Appropriate   Thought Content: Clear   Insight:   Good       Plan/Need for Future Services:  Return for therapy in 4 weeks to treat diagnosed problems.    Patient has a current master individualized treatment plan.  See Epic treatment plan for more information.    Referral / Collaboration:  Referral to another professional/service is not indicated at this time..  Emergency Services Needed?  No    Assignment:  Return in one month  Talk to sabra about chemistry    Interactive Complexity:  There are four specific communication difficulties that complicate the work of the primary psychiatric procedure.  Interactive complexity (+29550) may be reported when at least one of these difficulties is present.    Communication difficulties present during current the  psychiatric procedure include:  None.      Signature/Title:    Rosalinda Avendano, LMFT, [facility][note 3]

## 2025-01-16 NOTE — NURSING NOTE
Current patient location: [address] [place] [address]  [address] [address]    Is the patient currently in the state of [place]? YES    Visit mode: VIDEO    If the visit is dropped, the patient can be reconnected by:VIDEO VISIT: Send to e-mail at: [email or web address]    Will anyone else be joining the visit? NO  (If patient encounters technical issues they should call [phone]    Are changes needed to the allergy or medication list? No    Are refills needed on medications prescribed by this physician? Discuss with provider    Rooming Documentation:  Questionnaire(s) completed    Reason for visit: RECHECK    Jono GUERIN

## 2025-02-06 ENCOUNTER — OFFICE VISIT (OUTPATIENT)
Dept: PSYCHOLOGY | Facility: CLINIC | Age: 55
End: 2025-02-06
Payer: COMMERCIAL

## 2025-02-06 DIAGNOSIS — F91.8 CONDUCT DISORDER, UNDIFFERENTIATED TYPE: ICD-10-CM

## 2025-02-06 DIAGNOSIS — F34.1 DYSTHYMIC DISORDER: Primary | ICD-10-CM

## 2025-02-06 NOTE — GROUP NOTE
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 Not having issues with CSB.     Progress Toward Treatment Goals:   Stable/Maintenance of progress     Therapeutic Interventions/Treatment Strategies:    Area(s) of treatment focus addressed in this session included Symptom Management and Maintenance of Progress    Patient checked in about their mental health symptoms, healthy coping skills used over the week, negative coping skills used over the week, what sexual behaviors they engaged in-fantasy, masturbation, sex, their comfort level with their behaviors, if there were triggers, urges to violate boundaries, and violations of boundaries.  They took time to process about the past month they provided support and feedback to others in the group.    Psychotherapist offered support, feedback and validation and reinforced use of skills Treatment modalities used include [facility] THERAPY INTERVENTIONS: Group Dynamic Therapy  Interpersonal Relationship Skills: Encouraged development and maintenance  of healthy boundaries and Discussed strategies to promote healthier understanding of interpersonal relationships  Support, Feedback, and Structured Activity    Patient Response:   Patient responded to session by accepting  feedback, giving feedback, and listening  Possible barriers to participation / learning include: N/A    Current Mental Status Exam:   Appearance:  Appropriate   Eye Contact:  Good   Attitude / Demeanor: Cooperative   Speech      Rate / Production: Normal/ Responsive      Volume:  Normal  volume  Orientation:  All  Mood:   Normal Euthymic  Affect:   Appropriate   Thought Content: Clear   Insight:   Good       Plan/Need for Future Services:  Return for therapy in 4 weeks to treat diagnosed problems.    Patient has a current master individualized treatment plan.  See Epic treatment plan for more information.    Referral / Collaboration:  Referral to another professional/service is not indicated at this time..  Emergency Services Needed?  No    Assignment:  Return in one month     Interactive Complexity:  There are four specific communication difficulties that complicate the work of the primary psychiatric procedure.  Interactive complexity (+75337) may be reported when at least one of these difficulties is present.    Communication difficulties present during current the psychiatric procedure include:  None.      Signature/Title:    Doe Avendano, LMFT, [facility]

## 2025-02-13 ENCOUNTER — VIRTUAL VISIT (OUTPATIENT)
Dept: PSYCHOLOGY | Facility: CLINIC | Age: 55
End: 2025-02-13
Payer: COMMERCIAL

## 2025-02-13 DIAGNOSIS — F91.8 CONDUCT DISORDER, UNDIFFERENTIATED TYPE: ICD-10-CM

## 2025-02-13 DIAGNOSIS — F34.1 DYSTHYMIC DISORDER: Primary | ICD-10-CM

## 2025-02-13 NOTE — NURSING NOTE
Is the patient currently in the state of [place]? YES    Current patient location:  At work.    Visit mode:Video    If the visit is dropped, the patient can be reconnected by: VIDEO VISIT: Text to cell phone:   Telephone Information:   Mobile [phone]       Will anyone else be joining the visit? No  (If patient encounters technical issues they should call [phone])    Are changes needed to the allergy or medication list? N/A    Are refills needed on medications prescribed by this physician? No    Rooming Documentation: Questionnaire(s) not done per department protocol.    Reason for visit: APOORVA Valentin

## 2025-02-13 NOTE — PROGRESS NOTES
Virtual Visit Details    Type of service:  Video Visit     Originating Location (pt. Location): Home    Distant Location (provider location):  Off-site  Platform used for Video Visit: [facility]     Sexual [facility] - Progress Note    Date of Service: 25   Name: Javi Salmeron  : 1970  Medical Record Number: [id number]  Treating Provider: LARS Zuleta, NIA  Type of Session: Individual  Present in Session: pt  Session Start and Stop Time: 6205-8329  Number of Minutes:  45    SERVICE MODALITY:  Video Visit:      Provider verified identity through the following two step process.  Patient provided:  Patient is known previously to provider    Telemedicine Visit: The patient's condition can be safely assessed and treated via synchronous audio and visual telemedicine encounter.      Reason for Telemedicine Visit: Services only offered telehealth    Originating Site (Patient Location): Patient's home    Distant Site (Provider Location): [facility]    Consent:  The patient/guardian has verbally consented to: the potential risks and benefits of telemedicine (video visit) versus in person care; bill my insurance or make self-payment for services provided; and responsibility for payment of non-covered services.     Patient would like the video invitation sent by:  My Chart    Mode of Communication:  Video Conference via [facility]    Distant Location (Provider):  Off-site    As the provider I attest to compliance with applicable laws and regulations related to telemedicine.    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 Not having issues with CSB.     Progress Toward Treatment Goals:   Stable/Maintenance of progress      Therapeutic Interventions/Treatment Strategies:    Area(s) of treatment focus addressed in this session included Symptom Management, Interpersonal Relationship Skills, Sexual Health and Wellness, and Maintenance of Progress    Psychotherapist offered support, feedback and validation and reinforced use of skills Treatment modalities used include Cognitive Behavioral Therapy Solution Focused Therapy Interpersonal Coping Skills: Assisted patient in understanding the purpose of planning / creating / participating / sharing in positive experiences and Facilitated understanding of  what factors may contribute to symptom relapse and skills plan to manage symptom relapse , Other: Explored with patient how life transitions may impact mental health and functioning , and Relationship Skills: Assisted patients in implementing more effective communication skills in their relationships  Support, Feedback, and Structured Activity    Patient Response:   Patient responded to session by accepting feedback, listening, accepting support, and actively engaged  Possible barriers to participation / learning include: N/A    Current Mental Status Exam:   Appearance:  Appropriate   Eye Contact:  Good   Attitude / Demeanor: Cooperative   Speech      Rate / Production: Normal/ Responsive      Volume:  Normal  volume  Orientation:  All  Mood:   Anxious  Normal  Affect:   Appropriate   Thought Content: Clear   Insight:   Good       Plan/Need for Future Services:  Return for therapy in 4 weeks to treat diagnosed problems.    Patient has a current master individualized treatment plan.  See Epic treatment plan for more information.    Referral / Collaboration:  Referral to another professional/service is not indicated at this time..  Emergency Services Needed?  No    Assignment:  Talk to sabra tovar   Send catarina to twins     Interactive Complexity:  There are four specific communication difficulties that complicate the work of  the primary psychiatric procedure.  Interactive complexity (+14557) may be reported when at least one of these difficulties is present.    Communication difficulties present during current the psychiatric procedure include:  None.      Signature/Title:    LARS Zuleta, [facility]

## 2025-03-06 ENCOUNTER — OFFICE VISIT (OUTPATIENT)
Dept: PSYCHOLOGY | Facility: CLINIC | Age: 55
End: 2025-03-06
Payer: COMMERCIAL

## 2025-03-06 DIAGNOSIS — F34.1 DYSTHYMIC DISORDER: Primary | ICD-10-CM

## 2025-03-06 DIAGNOSIS — F91.8 CONDUCT DISORDER, UNDIFFERENTIATED TYPE: ICD-10-CM

## 2025-03-06 PROCEDURE — 90853 GROUP PSYCHOTHERAPY: CPT | Performed by: MARRIAGE & FAMILY THERAPIST

## 2025-03-06 NOTE — GROUP NOTE
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 Not having issues with CSB.     Progress Toward Treatment Goals:   Stable/Maintenance of progress     Therapeutic Interventions/Treatment Strategies:    Area(s) of treatment focus addressed in this session included Symptom Management, Interpersonal Relationship Skills, and Maintenance of Progress    Patient checked in about their mental health symptoms, healthy coping skills used over the week, negative coping skills used over the week, what sexual behaviors they engaged in-fantasy, masturbation, sex, their comfort level with their behaviors, if there were triggers, urges to violate boundaries, and violations of boundaries.  They took time to process about communication w friend they provided support and feedback to others in the group.    Psychotherapist offered support, feedback and validation and reinforced use of skills Treatment modalities used include [facility] THERAPY INTERVENTIONS: Group Dynamic Therapy  Interpersonal Relationship Skills: Assisted patients in implementing more effective communication skills in their relationships, Encouraged development and maintenance  of healthy boundaries, Discussed strategies to promote healthier understanding  of interpersonal relationships, and Discussed relationships and ways to reduce conflict   Support, Feedback, and Structured Activity    Patient Response:   Patient responded to session by accepting feedback, giving feedback, and listening  Possible barriers to participation / learning include: N/A    Current Mental Status Exam:   Appearance:  Appropriate   Eye Contact:  Good   Attitude / Demeanor: Cooperative   Speech      Rate / Production: Normal/ Responsive      Volume:  Normal  volume  Orientation:  All  Mood:   Anxious  Normal  Affect:   Appropriate   Thought Content: Clear   Insight:   Good       Plan/Need for Future Services:  Return for therapy in 4 weeks to treat diagnosed problems.    Patient has a current master individualized treatment plan.  See Epic treatment plan for more information.    Referral / Collaboration:  Referral to another professional/service is not indicated at this time..  Emergency Services Needed?  No    Assignment:  Return in one month    Interactive Complexity:  There are four specific communication difficulties that complicate the work of the primary psychiatric procedure.  Interactive complexity (+05841) may be reported when at least one of these difficulties is present.    Communication difficulties present during current the psychiatric procedure include:  None.      Signature/Title:    Doe Avendano, LMFT, [facility]

## 2025-03-20 ENCOUNTER — VIRTUAL VISIT (OUTPATIENT)
Dept: PSYCHOLOGY | Facility: CLINIC | Age: 55
End: 2025-03-20
Payer: COMMERCIAL

## 2025-03-20 DIAGNOSIS — F91.8 CONDUCT DISORDER, UNDIFFERENTIATED TYPE: ICD-10-CM

## 2025-03-20 DIAGNOSIS — F34.1 DYSTHYMIC DISORDER: Primary | ICD-10-CM

## 2025-03-20 NOTE — NURSING NOTE
Current patient location: [address] [place] [address]  [address] [place] [address]    Is the patient currently in the state of [place]? YES    Visit mode: VIDEO    If the visit is dropped, the patient can be reconnected by:VIDEO VISIT: Text to cell phone:   Telephone Information:   Mobile [phone]    and VIDEO VISIT: Send to e-mail at: [email or web address]    Will anyone else be joining the visit? NO  (If patient encounters technical issues they should call [phone]    Are changes needed to the allergy or medication list? No    Are refills needed on medications prescribed by this physician? NO    Rooming Documentation:  Not applicable    Reason for visit: RECHECK    Riya GUERIN

## 2025-03-20 NOTE — PROGRESS NOTES
Virtual Visit Details    Type of service:  Video Visit     Originating Location (pt. Location): Home    Distant Location (provider location):  On-site  Platform used for Video Visit: [facility]       Sexual [facility] - Progress Note    Date of Service: 3/20/25   Name: Javi Salmeron  : 1970  Medical Record Number: [id number]  Treating Provider: LARS Zuleta, NIA  Type of Session: Individual  Present in Session: pt  Session Start and Stop Time: 7930-7653  Number of Minutes:  45    SERVICE MODALITY:  Video Visit:      Provider verified identity through the following two step process.  Patient provided:  Patient is known previously to provider    Telemedicine Visit: The patient's condition can be safely assessed and treated via synchronous audio and visual telemedicine encounter.      Reason for Telemedicine Visit: Services only offered telehealth    Originating Site (Patient Location): Patient's home    Distant Site (Provider Location): [facility]    Consent:  The patient/guardian has verbally consented to: the potential risks and benefits of telemedicine (video visit) versus in person care; bill my insurance or make self-payment for services provided; and responsibility for payment of non-covered services.     Patient would like the video invitation sent by:  My Chart    Mode of Communication:  Video Conference via [facility]    Distant Location (Provider):  On-site    As the provider I attest to compliance with applicable laws and regulations related to telemedicine.    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 Not having issues with CSB.     Progress Toward Treatment Goals:   Stable/Maintenance of progress      Therapeutic Interventions/Treatment Strategies:    Area(s) of treatment focus addressed in this session included Symptom Management, Interpersonal Relationship Skills, Sexual Health and Wellness, and Maintenance of Progress    Psychotherapist offered support, feedback and validation and reinforced use of skills Treatment modalities used include Cognitive Behavioral Therapy Interpersonal Behavioral Activation: Explored how behaviors effect mood and interact with thoughts and feelings, Coping Skills: Assisted patient in understanding the purpose of planning / creating / participating / sharing in positive experiences, and Relationship Skills: Assisted patients in implementing more effective communication skills in their relationships and Encouraged development and maintenance  of healthy boundaries  Support, Feedback, and Structured Activity    Patient Response:   Patient responded to session by accepting feedback, listening, and accepting support  Possible barriers to participation / learning include: N/A    Current Mental Status Exam:   Appearance:  Appropriate   Eye Contact:  Good   Attitude / Demeanor: Cooperative   Speech      Rate / Production: Normal/ Responsive      Volume:  Normal  volume  Orientation:  All  Mood:   Normal Euthymic  Affect:   Appropriate   Thought Content: Clear   Insight:   Good       Plan/Need for Future Services:  Return for therapy in 4 weeks to treat diagnosed problems.    Patient has a current master individualized treatment plan.  See Epic treatment plan for more information.    Referral / Collaboration:  Referral to another professional/service is not indicated at this time..  Emergency Services Needed?  No    Assignment:  Return in one [facility]  Audition     Interactive Complexity:  There are four specific communication difficulties that complicate the work of the primary psychiatric procedure.  Interactive complexity (+25456) may be reported when at least one of these difficulties  is present.    Communication difficulties present during current the psychiatric procedure include:  None.      Signature/Title:    LARS Zuleta, [facility]

## 2025-03-24 ENCOUNTER — OFFICE VISIT (OUTPATIENT)
Dept: FAMILY MEDICINE | Facility: CLINIC | Age: 55
End: 2025-03-24
Payer: COMMERCIAL

## 2025-03-24 VITALS
DIASTOLIC BLOOD PRESSURE: 82 MMHG | HEART RATE: 67 BPM | SYSTOLIC BLOOD PRESSURE: 130 MMHG | OXYGEN SATURATION: 97 % | TEMPERATURE: 98.1 F | RESPIRATION RATE: 15 BRPM

## 2025-03-24 DIAGNOSIS — R10.84 ABDOMINAL PAIN, GENERALIZED: ICD-10-CM

## 2025-03-24 DIAGNOSIS — R19.7 DIARRHEA, UNSPECIFIED TYPE: Primary | ICD-10-CM

## 2025-03-24 PROCEDURE — 83690 ASSAY OF LIPASE: CPT | Performed by: FAMILY MEDICINE

## 2025-03-24 PROCEDURE — 80053 COMPREHEN METABOLIC PANEL: CPT | Performed by: FAMILY MEDICINE

## 2025-03-24 NOTE — NURSING NOTE
54 year old  Chief Complaint   Patient presents with    Gastrointestinal Problem     Returned from  first week of Feb., since then has only had one solid stool. Also notes a cough that has a slight metallic taste. Non productive cough.        Blood pressure 130/82, pulse 67, temperature 98.1  F (36.7  C), temperature source Oral, resp. rate 15, SpO2 97%. There is no height or weight on file to calculate BMI.  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)     Major depression in complete remission (H)    Hypertension    Congenital deformity of left lower extremity    Hyperlipidemia LDL goal <100       Wt Readings from Last 2 Encounters:   11/25/24 92.1 kg (203 lb)   04/05/24 90.3 kg (199 lb)     BP Readings from Last 3 Encounters:   03/24/25 130/82   11/25/24 132/75   04/05/24 126/74         Current Outpatient Medications   Medication Sig Dispense Refill    amLODIPine (NORVASC) 10 MG tablet Take 1 tablet (10 mg) by mouth daily. 90 tablet 3    atorvastatin (LIPITOR) 10 MG tablet TAKE 1 TABLET BY MOUTH DAILY 90 tablet 2    citalopram (CELEXA) 40 MG tablet Take 1 tablet (40 mg) by mouth every morning. 90 tablet 3    hydrochlorothiazide (HYDRODIURIL) 25 MG tablet Take 1 tablet (25 mg) by mouth daily 90 tablet 3    naltrexone (DEPADE/REVIA) 50 MG tablet Take 1 tablet (50 mg) by mouth daily 90 tablet 3     No current facility-administered medications for this visit.       Social History     Tobacco Use    Smoking status: Never    Smokeless tobacco: Never   Substance Use Topics    Alcohol use: Yes     Comment: one or two a month    Drug use: No       Health Maintenance Due   Topic Date Due    DEPRESSION ACTION PLAN  Never done    HEPATITIS B IMMUNIZATION (1 of 3 - 19+ 3-dose series) Never done    Pneumococcal  "Vaccine: 50+ Years (1 of 1 - PCV) Never done    MENTAL HEALTH TX PLAN  09/04/2022       No results found for: \"PAP\"      March 24, 2025 1:17 PM    "

## 2025-03-24 NOTE — PATIENT INSTRUCTIONS
ASSESSMENT and PLAN:    1. Abdominal pain, generalized    - Comprehensive metabolic panel; Future  - Lipase; Future    2. Diarrhea, unspecified type (Primary)    - Helicobacter pylori Antigen Stool; Future  - Enteric Bacteria and Virus Panel by [facility] Stool; Future      If no clear diagnosis and symptoms persist, consider either short course of Azithromax for treatment and/or imaging for diagnostic purposes.         Mt Becker MD  Family Medicine and Sports Medicine  [facility]

## 2025-03-24 NOTE — PROGRESS NOTES
ASSESSMENT and PLAN:    1. Abdominal pain, generalized    - Comprehensive metabolic panel; Future  - Lipase; Future    2. Diarrhea, unspecified type (Primary)    - Helicobacter pylori Antigen Stool; Future  - Enteric Bacteria and Virus Panel by [facility] Stool; Future      If no clear diagnosis and symptoms persist, consider either short course of Azithromax for treatment and/or imaging for diagnostic purposes.         Mt Becker MD  Family Medicine and Sports Medicine  [facility]      Medical assistant intake:  Javi Salmeron is a 54 year old male who presents to [facility] today for Gastrointestinal Problem (Returned from [place] first week of Feb., since then has only had one solid stool. Also notes a cough that has a slight metallic taste. Non productive cough. )      SUBJECTIVE:   Javi is here with diarrhea since returning from the [place] about 6 weeks ago.     Having about 2 diarrheal stools /week. Eating well. Normal energy level.   Also, with a cough that started 2 weeks ago.   No fevers.     Review Of Systems:    See subjective.   Has otherwise been in usual state of health.   Normal urination.     Problem list per EMR:  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)     Major depression in complete remission (H)    Hypertension    Congenital deformity of left lower extremity    Hyperlipidemia LDL goal <100       Current Outpatient Medications   Medication Sig Dispense Refill    amLODIPine (NORVASC) 10 MG tablet Take 1 tablet (10 mg) by mouth daily. 90 tablet 3    atorvastatin (LIPITOR) 10 MG tablet TAKE 1 TABLET BY MOUTH DAILY 90 tablet 2    citalopram (CELEXA) 40 MG tablet Take 1 tablet (40 mg) by mouth every morning. 90 tablet 3    hydrochlorothiazide (HYDRODIURIL) 25 MG tablet  "Take 1 tablet (25 mg) by mouth daily 90 tablet 3    naltrexone (DEPADE/REVIA) 50 MG tablet Take 1 tablet (50 mg) by mouth daily 90 tablet 3       Allergies   Allergen Reactions    Dust Mites     Grass     Ragweeds     Codeine Anxiety     \"gets loopy\" - doesn't like how it feels        OBJECTIVE    Vitals: /82 (BP Location: Left arm, Patient Position: Sitting, Cuff Size: Adult Large)   Pulse 67   Temp 98.1  F (36.7  C) (Oral)   Resp 15   SpO2 97%   BMI= There is no height or weight on file to calculate BMI.    He appears in his usual state of health.    Neck is supple without lymphadenopathy    Cardiovascular-regular rate and rhythm without murmurs.    Lungs-clear to auscultation throughout.    Abdomen-his liver appears mildly enlarged at about 4 cm below the costal margin.  No tenderness.  Normal bowel sounds.  No guarding.  No rebound.    SEE TOP OF NOTE FOR ASSESSMENT AND PLAN    --Mt Becker MD  [facility], Department of Family Medicine and Community Health  "

## 2025-03-25 LAB
ALBUMIN SERPL BCG-MCNC: 4.1 G/DL (ref 3.5–5.2)
ALP SERPL-CCNC: 56 U/L (ref 40–150)
ALT SERPL W P-5'-P-CCNC: 18 U/L (ref 0–70)
ANION GAP SERPL CALCULATED.3IONS-SCNC: 8 MMOL/L (ref 7–15)
AST SERPL W P-5'-P-CCNC: 17 U/L (ref 0–45)
BILIRUB SERPL-MCNC: 0.5 MG/DL
BUN SERPL-MCNC: 20.3 MG/DL (ref 6–20)
CALCIUM SERPL-MCNC: 9.2 MG/DL (ref 8.8–10.4)
CHLORIDE SERPL-SCNC: 104 MMOL/L (ref 98–107)
CREAT SERPL-MCNC: 0.97 MG/DL (ref 0.67–1.17)
EGFRCR SERPLBLD CKD-EPI 2021: >90 ML/MIN/1.73M2
GLUCOSE SERPL-MCNC: 102 MG/DL (ref 70–99)
HCO3 SERPL-SCNC: 29 MMOL/L (ref 22–29)
LIPASE SERPL-CCNC: 28 U/L (ref 13–60)
POTASSIUM SERPL-SCNC: 4.4 MMOL/L (ref 3.4–5.3)
PROT SERPL-MCNC: 6.8 G/DL (ref 6.4–8.3)
SODIUM SERPL-SCNC: 141 MMOL/L (ref 135–145)

## 2025-03-27 LAB
ADV 40+41 DNA STL QL NAA+NON-PROBE: NEGATIVE
ASTRO TYP 1-8 RNA STL QL NAA+NON-PROBE: NEGATIVE
C CAYETANENSIS DNA STL QL NAA+NON-PROBE: NEGATIVE
CAMPYLOBACTER DNA SPEC NAA+PROBE: NEGATIVE
CRYPTOSP DNA STL QL NAA+NON-PROBE: NEGATIVE
E COLI O157 DNA STL QL NAA+NON-PROBE: ABNORMAL
E HISTOLYT DNA STL QL NAA+NON-PROBE: NEGATIVE
EAEC ASTA GENE ISLT QL NAA+PROBE: POSITIVE
EC STX1+STX2 GENES STL QL NAA+NON-PROBE: NEGATIVE
EPEC EAE GENE STL QL NAA+NON-PROBE: POSITIVE
ETEC LTA+ST1A+ST1B TOX ST NAA+NON-PROBE: NEGATIVE
G LAMBLIA DNA STL QL NAA+NON-PROBE: NEGATIVE
NOROVIRUS GI+II RNA STL QL NAA+NON-PROBE: NEGATIVE
P SHIGELLOIDES DNA STL QL NAA+NON-PROBE: NEGATIVE
RVA RNA STL QL NAA+NON-PROBE: NEGATIVE
SALMONELLA SP RPOD STL QL NAA+PROBE: NEGATIVE
SAPO I+II+IV+V RNA STL QL NAA+NON-PROBE: NEGATIVE
SHIGELLA SP+EIEC IPAH ST NAA+NON-PROBE: NEGATIVE
V CHOLERAE DNA SPEC QL NAA+PROBE: NEGATIVE
VIBRIO DNA SPEC NAA+PROBE: NEGATIVE
Y ENTEROCOL DNA STL QL NAA+PROBE: NEGATIVE

## 2025-03-27 PROCEDURE — 87507 IADNA-DNA/RNA PROBE TQ 12-25: CPT | Performed by: FAMILY MEDICINE

## 2025-04-03 ENCOUNTER — OFFICE VISIT (OUTPATIENT)
Dept: PSYCHOLOGY | Facility: CLINIC | Age: 55
End: 2025-04-03
Payer: COMMERCIAL

## 2025-04-03 DIAGNOSIS — F91.8 CONDUCT DISORDER, UNDIFFERENTIATED TYPE: ICD-10-CM

## 2025-04-03 DIAGNOSIS — F34.1 DYSTHYMIC DISORDER: Primary | ICD-10-CM

## 2025-04-03 NOTE — GROUP NOTE
"[facility]  [address], [address]  [place]  [address]    Group Progress Note  [facility] - Progress Note    Date of Service: 25   Name: Javi Salmeron  : 1970  Medical Record Number: [id number]  START TIME:  4:30 PM  END TIME:  6:30 PM  FACILITATOR(S): Rosalinda Avendano, LMFT, [facility]  TOPIC: HC Group Therapy  Type of Session: Group  :  Number of Attendees:  6  Number of Minutes:  /120    SERVICE MODALITY:  In-person      DSM-5 Diagnoses:  300.4 Dysthymia  312.89 (F91.8) Other Specified Disruptive, Impulse Control, and Conduct Disorder (Hypersexual Disorder)     Current Reported Symptoms and Status update:  Changes since last session-reports things w work have been getting worst  Depression reasonably well managed. Not having issues with CSB.     Progress Toward Treatment Goals:   Stable/Maintenance of progress     Therapeutic Interventions/Treatment Strategies:    Area(s) of treatment focus addressed in this session included Symptom Management and Maintenance of Progress    Patient checked in about their mental health symptoms, healthy coping skills used over the week, negative coping skills used over the week, what sexual behaviors they engaged in-fantasy, masturbation, sex, their comfort level with their behaviors, if there were triggers, urges to violate boundaries, and violations of boundaries.  They took time to process about work and frustration  they provided support and feedback to others in the group.    Psychotherapist offered support, feedback and validation and reinforced use of skills Treatment modalities used include [facility] THERAPY INTERVENTIONS: Group Dynamic Therapy  Interpersonal Coping Skills: Discussed how the use of intentional \"in the moment\" actions can help reduce distress and Facilitated understanding of  what factors may contribute to symptom relapse and skills plan to manage symptom relapse   Support, Feedback, and Structured " Activity    Patient Response:   Patient responded to session by accepting feedback, giving feedback, and listening  Possible barriers to participation / learning include: N/A    Current Mental Status Exam:   Appearance:  Appropriate   Eye Contact:  Good   Attitude / Demeanor: Cooperative   Speech      Rate / Production: Normal/ Responsive      Volume:  Normal  volume  Orientation:  All  Mood:   Anxious   Affect:   Appropriate   Thought Content: Clear   Insight:   Good       Plan/Need for Future Services:  Return for therapy in 4 weeks to treat diagnosed problems.    Patient has a current master individualized treatment plan.  See Epic treatment plan for more information.    Referral / Collaboration:  Referral to another professional/service is not indicated at this time..  Emergency Services Needed?  No    Assignment:  Return in one month     Interactive Complexity:  There are four specific communication difficulties that complicate the work of the primary psychiatric procedure.  Interactive complexity (+97383) may be reported when at least one of these difficulties is present.    Communication difficulties present during current the psychiatric procedure include:  None.      Signature/Title:    Doe Avendano, LMFT, [facility]

## 2025-04-17 ENCOUNTER — VIRTUAL VISIT (OUTPATIENT)
Dept: PSYCHOLOGY | Facility: CLINIC | Age: 55
End: 2025-04-17
Payer: COMMERCIAL

## 2025-04-17 DIAGNOSIS — F91.8 CONDUCT DISORDER, UNDIFFERENTIATED TYPE: ICD-10-CM

## 2025-04-17 DIAGNOSIS — F34.1 DYSTHYMIC DISORDER: Primary | ICD-10-CM

## 2025-04-17 NOTE — NURSING NOTE
Is the patient currently in the state of [place]? YES    Current patient location: [address] [place] [address]  [place] [address]    Visit mode:Video    If the visit is dropped, the patient can be reconnected by: VIDEO VISIT: Text to cell phone:   Telephone Information:   Mobile [phone]       Will anyone else be joining the visit? No  (If patient encounters technical issues they should call [phone])    Are changes needed to the allergy or medication list? N/A    Are refills needed on medications prescribed by this physician? No    Rooming Documentation: Questionnaire(s) not done per department protocol.    Reason for visit: RECHECK     APOORVA Mckinney

## 2025-04-17 NOTE — PROGRESS NOTES
Virtual Visit Details    Type of service:  Video Visit     Originating Location (pt. Location): Home    Distant Location (provider location):  On-site  Platform used for Video Visit: [facility]   Sexual [facility] Gender Health [facility] - Progress Note    Date of Service: 25   Name: Javi Salmeron  : 1970  Medical Record Number: [id number]  Treating Provider: LARS Zuleta, MEET  Type of Session: Individual  Present in Session: pt  Session Start and Stop Time: 4382-3262  Number of Minutes:  40    SERVICE MODALITY:  Video Visit:      Provider verified identity through the following two step process.  Patient provided:  Patient is known previously to provider    Telemedicine Visit: The patient's condition can be safely assessed and treated via synchronous audio and visual telemedicine encounter.      Reason for Telemedicine Visit: Services only offered telehealth    Originating Site (Patient Location): Patient's home    Distant Site (Provider Location): [facility] SEXUAL AND GENDER HEALTH [facility]    Consent:  The patient/guardian has verbally consented to: the potential risks and benefits of telemedicine (video visit) versus in person care; bill my insurance or make self-payment for services provided; and responsibility for payment of non-covered services.     Patient would like the video invitation sent by:  My Chart    Mode of Communication:  Video Conference via [facility]    Distant Location (Provider):  On-site    As the provider I attest to compliance with applicable laws and regulations related to telemedicine.    DSM-5 Diagnoses:  300.4 Dysthymia  312.89 (F91.8) Other Specified Disruptive, Impulse Control, and Conduct Disorder (Hypersexual Disorder)     Current Reported Symptoms and Status update:  Changes since last session-working on finding new job  Depression reasonably well managed. Not having issues with CSB.     Progress Toward Treatment Goals:   Stable/Maintenance of progress     Therapeutic  Interventions/Treatment Strategies:    Area(s) of treatment focus addressed in this session included Symptom Management, Interpersonal Relationship Skills, Sexual Health and Wellness, and Maintenance of Progress    Psychotherapist offered support, feedback and validation Treatment modalities used include Solution Focused Therapy Interpersonal Coping Skills: Assisted patient in understanding the purpose of planning / creating / participating / sharing in positive experiences, Relationship Skills: Assisted patients in implementing more effective communication skills in their relationships, and discussed application of self compassion  Support and Feedback    Patient Response:   Patient responded to session by listening and accepting support  Possible barriers to participation / learning include: N/A    Current Mental Status Exam:   Appearance:  Appropriate   Eye Contact:  Good   Attitude / Demeanor: Cooperative   Speech      Rate / Production: Normal/ Responsive      Volume:  Normal  volume  Orientation:  All  Mood:   Normal Euthymic  Affect:   Appropriate   Thought Content: Clear   Insight:   Good       Plan/Need for Future Services:  Return for therapy in 4 weeks to treat diagnosed problems.    Patient has a current master individualized treatment plan.  See Epic treatment plan for more information.    Referral / Collaboration:  Referral to another professional/service is not indicated at this time..  Emergency Services Needed?  No    Assignment:  Return in one month     Interactive Complexity:  There are four specific communication difficulties that complicate the work of the primary psychiatric procedure.  Interactive complexity (+01568) may be reported when at least one of these difficulties is present.    Communication difficulties present during current the psychiatric procedure include:  None.      Signature/Title:    LARS Zuleta, [facility]

## 2025-04-25 DIAGNOSIS — I10 ESSENTIAL HYPERTENSION, BENIGN: ICD-10-CM

## 2025-04-29 RX ORDER — HYDROCHLOROTHIAZIDE 25 MG/1
25 TABLET ORAL DAILY
Qty: 90 TABLET | Refills: 2 | Status: SHIPPED | OUTPATIENT
Start: 2025-04-29

## 2025-04-29 NOTE — TELEPHONE ENCOUNTER
Medication requested: hydrochlorothiazide (HYDRODIURIL) 25 MG tablet   Last office visit: 3/24/25  [facility] appointments: none  Medication last refilled: 4/5/24; 90 + 3 refills  Last qualifying labs:   BP Readings from Last 3 Encounters:   03/24/25 130/82   11/25/24 132/75   04/05/24 126/74     Component      Latest Ref Rng 3/24/2025  1:41 PM   Sodium      135 - 145 mmol/L 141    Potassium      3.4 - 5.3 mmol/L 4.4      Component      Latest Ref Rng 3/24/2025  1:41 PM   GFR Estimate      >60 mL/min/1.73m2 >90      Prescription approved per [facility] Refill Protocol.    Lauro PIERCE, RN  04/29/25 8:18 AM

## 2025-04-30 DIAGNOSIS — R19.7 DIARRHEA, UNSPECIFIED TYPE: Primary | ICD-10-CM

## 2025-05-01 ENCOUNTER — LAB (OUTPATIENT)
Dept: LAB | Facility: CLINIC | Age: 55
End: 2025-05-01
Payer: COMMERCIAL

## 2025-05-01 ENCOUNTER — OFFICE VISIT (OUTPATIENT)
Dept: PSYCHOLOGY | Facility: CLINIC | Age: 55
End: 2025-05-01
Payer: COMMERCIAL

## 2025-05-01 DIAGNOSIS — F91.8 CONDUCT DISORDER, UNDIFFERENTIATED TYPE: ICD-10-CM

## 2025-05-01 DIAGNOSIS — R19.7 DIARRHEA, UNSPECIFIED TYPE: ICD-10-CM

## 2025-05-01 DIAGNOSIS — F34.1 DYSTHYMIC DISORDER: Primary | ICD-10-CM

## 2025-05-01 PROCEDURE — 90853 GROUP PSYCHOTHERAPY: CPT | Performed by: MARRIAGE & FAMILY THERAPIST

## 2025-05-01 NOTE — GROUP NOTE
[facility]  [address], [address]  [place]  [address]    Group Progress Note  [facility] - Progress Note    Date of Service: 25   Name: Javi Salmeron  : 1970  Medical Record Number: [id number]  START TIME:  4:30 PM  END TIME:  6:30 PM  FACILITATOR(S): Rosalinda Avendano LMFT, [facility]  TOPIC: [facility] Group Therapy  Type of Session: Group  :  Number of Attendees:  8  Number of Minutes:  /120    SERVICE MODALITY:  In-person8      DSM-5 Diagnoses:  300.4 Dysthymia  312.89 (F91.8) Other Specified Disruptive, Impulse Control, and Conduct Disorder (Hypersexual Disorder)     Current Reported Symptoms and Status update:  Changes since last session-increased depression symptoms  Depression reasonably well managed. Not having issues with CSB.     Progress Toward Treatment Goals:   Stable/Maintenance of progress     Therapeutic Interventions/Treatment Strategies:    Area(s) of treatment focus addressed in this session included Symptom Management, Interpersonal Relationship Skills, and Maintenance of Progress    Patient checked in about their mental health symptoms, healthy coping skills used over the week, negative coping skills used over the week, what sexual behaviors they engaged in-fantasy, masturbation, sex, their comfort level with their behaviors, if there were triggers, urges to violate boundaries, and violations of boundaries.  They took time to process about past month and increase in depressive symptoms they provided support and feedback to others in the group.        Psychotherapist offered support, feedback and validation and reinforced use of skills Coping Skills: Assisted patient in understanding the purpose of planning / creating / participating / sharing in positive experiences, Facilitated understanding of  what factors may contribute to symptom relapse and skills plan to manage symptom relapse , and Addressed barriers to utilizing coping skills  when in distress  Support, Feedback, and Structured Activity    Patient Response:   Patient responded to session by accepting feedback, giving feedback, and listening  Possible barriers to participation / learning include: N/A    Current Mental Status Exam:   Appearance:  Appropriate   Eye Contact:  Good   Attitude / Demeanor: Cooperative   Speech      Rate / Production: Normal/ Responsive      Volume:  Normal  volume  Orientation:  All  Mood:   Anxious  Depressed   Affect:   Appropriate   Thought Content: Clear   Insight:   Good       Plan/Need for Future Services:  Return for therapy in 4 weeks to treat diagnosed problems.    Patient has a current master individualized treatment plan.  See Epic treatment plan for more information.    Referral / Collaboration:  Referral to another professional/service is not indicated at this time..  Emergency Services Needed?  No    Assignment:  Return in 4 weeks     Interactive Complexity:  There are four specific communication difficulties that complicate the work of the primary psychiatric procedure.  Interactive complexity (+66371) may be reported when at least one of these difficulties is present.    Communication difficulties present during current the psychiatric procedure include:  None.      Signature/Title:    LARS Zuleta, [facility]

## 2025-05-03 LAB
ADV 40+41 DNA STL QL NAA+NON-PROBE: NEGATIVE
ASTRO TYP 1-8 RNA STL QL NAA+NON-PROBE: NEGATIVE
C CAYETANENSIS DNA STL QL NAA+NON-PROBE: NEGATIVE
CAMPYLOBACTER DNA SPEC NAA+PROBE: NEGATIVE
CRYPTOSP DNA STL QL NAA+NON-PROBE: NEGATIVE
E COLI O157 DNA STL QL NAA+NON-PROBE: NORMAL
E HISTOLYT DNA STL QL NAA+NON-PROBE: NEGATIVE
EAEC ASTA GENE ISLT QL NAA+PROBE: NEGATIVE
EC STX1+STX2 GENES STL QL NAA+NON-PROBE: NEGATIVE
EPEC EAE GENE STL QL NAA+NON-PROBE: NEGATIVE
ETEC LTA+ST1A+ST1B TOX ST NAA+NON-PROBE: NEGATIVE
G LAMBLIA DNA STL QL NAA+NON-PROBE: NEGATIVE
NOROVIRUS GI+II RNA STL QL NAA+NON-PROBE: NEGATIVE
P SHIGELLOIDES DNA STL QL NAA+NON-PROBE: NEGATIVE
RVA RNA STL QL NAA+NON-PROBE: NEGATIVE
SALMONELLA SP RPOD STL QL NAA+PROBE: NEGATIVE
SAPO I+II+IV+V RNA STL QL NAA+NON-PROBE: NEGATIVE
SHIGELLA SP+EIEC IPAH ST NAA+NON-PROBE: NEGATIVE
V CHOLERAE DNA SPEC QL NAA+PROBE: NEGATIVE
VIBRIO DNA SPEC NAA+PROBE: NEGATIVE
Y ENTEROCOL DNA STL QL NAA+PROBE: NEGATIVE

## 2025-05-08 DIAGNOSIS — I10 ESSENTIAL HYPERTENSION, BENIGN: ICD-10-CM

## 2025-05-09 RX ORDER — HYDROCHLOROTHIAZIDE 25 MG/1
25 TABLET ORAL DAILY
Qty: 90 TABLET | Refills: 2 | OUTPATIENT
Start: 2025-05-09

## 2025-05-15 ENCOUNTER — VIRTUAL VISIT (OUTPATIENT)
Dept: PSYCHOLOGY | Facility: CLINIC | Age: 55
End: 2025-05-15
Payer: COMMERCIAL

## 2025-05-15 DIAGNOSIS — F34.1 DYSTHYMIC DISORDER: Primary | ICD-10-CM

## 2025-05-15 DIAGNOSIS — F91.8 CONDUCT DISORDER, UNDIFFERENTIATED TYPE: ICD-10-CM

## 2025-05-15 NOTE — NURSING NOTE
Current patient location: [address] [place] [address]  [place] [address]    Is the patient currently in the state of [place]? YES    Visit mode: VIDEO    If the visit is dropped, the patient can be reconnected by:VIDEO VISIT: Text to cell phone:   Telephone Information:   Mobile [phone]    and VIDEO VISIT: Send to e-mail at: [email or web address]    Will anyone else be joining the visit? NO  (If patient encounters technical issues they should call [phone]    Are changes needed to the allergy or medication list? No    Are refills needed on medications prescribed by this physician? NO    Rooming Documentation:  PROMIS 10 completed during check in, other qrns not done due to department protocol.    Reason for visit: RECHECK    Riya GUERIN

## 2025-05-15 NOTE — PROGRESS NOTES
Virtual Visit Details    Type of service:  Video Visit     Originating Location (pt. Location): Home    Distant Location (provider location):  Off-site  Platform used for Video Visit: [facility]     Sexual [facility] Gender Health [facility] - Progress Note    Date of Service: 5/15/25   Name: Javi Salmeron  : 1970  Medical Record Number: [id number]  Treating Provider: LARS Zuleta, METE  Type of Session: Individual  Present in Session: pt  Session Start and Stop Time: 6987-6019  Number of Minutes:  38    SERVICE MODALITY:  Video Visit:      Provider verified identity through the following two step process.  Patient provided:  Patient is known previously to provider    Telemedicine Visit: The patient's condition can be safely assessed and treated via synchronous audio and visual telemedicine encounter.      Reason for Telemedicine Visit: Services only offered telehealth    Originating Site (Patient Location): Patient's home    Distant Site (Provider Location): [facility] SEXUAL AND GENDER HEALTH [facility]    Consent:  The patient/guardian has verbally consented to: the potential risks and benefits of telemedicine (video visit) versus in person care; bill my insurance or make self-payment for services provided; and responsibility for payment of non-covered services.     Patient would like the video invitation sent by:  My Chart    Mode of Communication:  Video Conference via [facility]    Distant Location (Provider):  Off-site    As the provider I attest to compliance with applicable laws and regulations related to telemedicine.    DSM-5 Diagnoses:  300.4 Dysthymia  312.89 (F91.8) Other Specified Disruptive, Impulse Control, and Conduct Disorder (Hypersexual Disorder)     Current Reported Symptoms and Status update:  Changes since last session-son graduated college  Depression reasonably well managed. Not having issues with CSB.     Progress Toward Treatment Goals:   Stable/Maintenance of progress     Therapeutic  Interventions/Treatment Strategies:    Area(s) of treatment focus addressed in this session included Symptom Management, Interpersonal Relationship Skills, and Sexual Health and Wellness    Psychotherapist offered support, feedback and validation and reinforced use of skills Treatment modalities used include Cognitive Behavioral Therapy Solution Focused Therapy Interpersonal Cognitive Restructuring:  Assisted patient in identifying new neutral/positive core beliefs and discussed application of self compassion, explored challenging unrealistic expectations of self/others, and explored comfort with sexual communication  Support, Feedback, and Structured Activity    Patient Response:   Patient responded to session by accepting feedback, listening, accepting support, and actively engaged  Possible barriers to participation / learning include: N/A    Current Mental Status Exam:   Appearance:  Appropriate   Eye Contact:  Good   Attitude / Demeanor: Cooperative   Speech      Rate / Production: Normal/ Responsive      Volume:  Normal  volume  Orientation:  All  Mood:   Normal Euthymic  Affect:   Appropriate   Thought Content: Clear   Insight:   Good       Plan/Need for Future Services:  Return for therapy in 4 weeks to treat diagnosed problems.    Patient has a current master individualized treatment plan.  See Epic treatment plan for more information.    Referral / Collaboration:  Referral to another professional/service is not indicated at this time..  Emergency Services Needed?  No    Assignment:  Return in one month     Interactive Complexity:  There are four specific communication difficulties that complicate the work of the primary psychiatric procedure.  Interactive complexity (+20519) may be reported when at least one of these difficulties is present.    Communication difficulties present during current the psychiatric procedure include:  None.      Signature/Title:    Doe Avendano, LMFT, [facility]

## 2025-06-05 DIAGNOSIS — F42.9 OBSESSIVE-COMPULSIVE DISORDER, UNSPECIFIED TYPE: ICD-10-CM

## 2025-06-05 RX ORDER — NALTREXONE HYDROCHLORIDE 50 MG/1
50 TABLET, FILM COATED ORAL DAILY
Qty: 90 TABLET | Refills: 3 | Status: SHIPPED | OUTPATIENT
Start: 2025-06-05

## 2025-06-12 ENCOUNTER — OFFICE VISIT (OUTPATIENT)
Dept: PSYCHOLOGY | Facility: CLINIC | Age: 55
End: 2025-06-12
Payer: COMMERCIAL

## 2025-06-12 DIAGNOSIS — F34.1 DYSTHYMIC DISORDER: Primary | ICD-10-CM

## 2025-06-12 DIAGNOSIS — F91.8 CONDUCT DISORDER, UNDIFFERENTIATED TYPE: ICD-10-CM

## 2025-06-12 PROCEDURE — 90853 GROUP PSYCHOTHERAPY: CPT | Performed by: MARRIAGE & FAMILY THERAPIST

## 2025-06-12 NOTE — GROUP NOTE
[facility]  [address], [address]  [place]  [address]    Group Progress Note  [facility] - Progress Note    Date of Service: 25   Name: Javi Salmeron  : 1970  Medical Record Number: [id number]  START TIME:  4:30 PM  END TIME:  6:30 PM  FACILITATOR(S): Rosalinda Avendano LMFT, [facility]  TOPIC: [facility] Group Therapy  Type of Session: Group  :  Number of Attendees:  6  Number of Minutes:  /120    SERVICE MODALITY:  In-person    DSM-5 Diagnoses:  300.4 Dysthymia  312.89 (F91.8) Other Specified Disruptive, Impulse Control, and Conduct Disorder (Hypersexual Disorder)     Current Reported Symptoms and Status update:  Changes since last session had a more low month   Depression reasonably well managed. Not having issues with CSB.     Progress Toward Treatment Goals:   Stable/Maintenance of progress     Therapeutic Interventions/Treatment Strategies:    Area(s) of treatment focus addressed in this session included Symptom Management, Interpersonal Relationship Skills, and Maintenance of Progress    Patient checked in about their mental health symptoms, healthy coping skills used over the week, negative coping skills used over the week, what sexual behaviors they engaged in-fantasy, masturbation, sex, their comfort level with their behaviors, if there were triggers, urges to violate boundaries, and violations of boundaries.  They took time to process about relationships w family they provided support and feedback to others in the group.    Psychotherapist offered support, feedback and validation and reinforced use of skills Treatment modalities used include [facility] THERAPY INTERVENTIONS: Cognitive Behavioral Therapy Group Dynamic Therapy  Interpersonal Relationship Skills: Discussed strategies to promote healthier understanding of interpersonal relationships and explored challenging unrealistic expectations of self/others  Support, Feedback, and Structured  Activity    Patient Response:   Patient responded to session by accepting feedback, giving feedback, and listening  Possible barriers to participation / learning include: N/A    Current Mental Status Exam:   Appearance:  Appropriate   Eye Contact:  Good   Attitude / Demeanor: Cooperative   Speech      Rate / Production: Normal/ Responsive      Volume:  Normal  volume  Orientation:  All  Mood:   Anxious  Normal  Affect:   Appropriate   Thought Content: Clear   Insight:   Good       Plan/Need for Future Services:  Return for therapy in 4 weeks to treat diagnosed problems.    Patient has a current master individualized treatment plan.  See Epic treatment plan for more information.    Referral / Collaboration:  Referral to another professional/service is not indicated at this time..  Emergency Services Needed?  No    Assignment:  Return in 4 weeks     Interactive Complexity:  There are four specific communication difficulties that complicate the work of the primary psychiatric procedure.  Interactive complexity (+12892) may be reported when at least one of these difficulties is present.    Communication difficulties present during current the psychiatric procedure include:  None.      Signature/Title:    Doe Avendano, LMFT, [facility]

## 2025-06-19 ENCOUNTER — VIRTUAL VISIT (OUTPATIENT)
Dept: PSYCHOLOGY | Facility: CLINIC | Age: 55
End: 2025-06-19
Payer: COMMERCIAL

## 2025-06-19 DIAGNOSIS — F91.8 CONDUCT DISORDER, UNDIFFERENTIATED TYPE: ICD-10-CM

## 2025-06-19 DIAGNOSIS — F34.1 DYSTHYMIC DISORDER: Primary | ICD-10-CM

## 2025-06-19 NOTE — NURSING NOTE
Is the patient currently in the state of [place]? YES    Current patient location: [address] [place] [address] [place] [address]    Visit mode:Video    If the visit is dropped, the patient can be reconnected by: VIDEO VISIT: Text to cell phone:   Telephone Information:   Mobile [phone]    and VIDEO VISIT:  Send e-mail to at [email or web address]    Will anyone else be joining the visit? No  (If patient encounters technical issues they should call [phone])    Are changes needed to the allergy or medication list? N/A    Are refills needed on medications prescribed by this physician? No    Rooming Documentation: Questionnaire(s) not done per department protocol.    Reason for visit: RECHECK     APOORVA Barnes

## 2025-06-19 NOTE — PROGRESS NOTES
Virtual Visit Details    Type of service:  Video Visit     Originating Location (pt. Location): Home    Distant Location (provider location):  On-site  Platform used for Video Visit: [facility]     Sexual and Gender Health [facility] - Progress Note    Date of Service: 25   Name: Javi Salmeron  : 1970  Medical Record Number: [id number]  Treating Provider: LARS Zuleta, NIA  Type of Session: Individual  Present in Session: pt  Session Start and Stop Time: 9381-3340  Number of Minutes:  30    SERVICE MODALITY:  Video Visit:      Provider verified identity through the following two step process.  Patient provided:  Patient is known previously to provider    Telemedicine Visit: The patient's condition can be safely assessed and treated via synchronous audio and visual telemedicine encounter.      Reason for Telemedicine Visit: Services only offered telehealth    Originating Site (Patient Location): Patient's home    Distant Site (Provider Location): [facility]    Consent:  The patient/guardian has verbally consented to: the potential risks and benefits of telemedicine (video visit) versus in person care; bill my insurance or make self-payment for services provided; and responsibility for payment of non-covered services.     Patient would like the video invitation sent by:  My Chart    Mode of Communication:  Video Conference via [facility]    Distant Location (Provider):  On-site    As the provider I attest to compliance with applicable laws and regulations related to telemedicine.    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 Not having issues with CSB.     Progress Toward Treatment Goals:   Stable/Maintenance of  progress     Therapeutic Interventions/Treatment Strategies:    Area(s) of treatment focus addressed in this session included Symptom Management, Interpersonal Relationship Skills, Sexual Health and Wellness, and Maintenance of Progress    Psychotherapist offered support, feedback and validation and reinforced use of skills Treatment modalities used include Cognitive Behavioral Therapy Interpersonal Relationship Skills: Assisted patients in implementing more effective communication skills in their relationships and Encouraged development and maintenance  of healthy boundaries and discussed application of self compassion  Support and Feedback    Patient Response:   Patient responded to session by accepting feedback, accepting support, verbalizing understanding, and actively engaged  Possible barriers to participation / learning include: N/A    Current Mental Status Exam:   Appearance:  Appropriate   Eye Contact:  Good   Attitude / Demeanor: Cooperative   Speech      Rate / Production: Normal/ Responsive      Volume:  Normal  volume  Orientation:  All  Mood:   Normal Euthymic  Affect:   Appropriate   Thought Content: Clear   Insight:   Good       Plan/Need for Future Services:  Return for therapy in 4 weeks to treat diagnosed problems.    Patient has a current master individualized treatment plan.  See Epic treatment plan for more information.    Referral / Collaboration:  Referral to another professional/service is not indicated at this time..  Emergency Services Needed?  No    Assignment:  Return in 1 month    Interactive Complexity:  There are four specific communication difficulties that complicate the work of the primary psychiatric procedure.  Interactive complexity (+56674) may be reported when at least one of these difficulties is present.    Communication difficulties present during current the psychiatric procedure include:  None.      Signature/Title:    LARS Zuleta, [facility]

## 2025-07-10 ENCOUNTER — OFFICE VISIT (OUTPATIENT)
Dept: PSYCHOLOGY | Facility: CLINIC | Age: 55
End: 2025-07-10
Payer: COMMERCIAL

## 2025-07-10 DIAGNOSIS — F34.1 DYSTHYMIC DISORDER: Primary | ICD-10-CM

## 2025-07-10 DIAGNOSIS — F91.8 CONDUCT DISORDER, UNDIFFERENTIATED TYPE: ICD-10-CM

## 2025-07-10 NOTE — GROUP NOTE
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 Not having issues with CSB.     Progress Toward Treatment Goals:   Stable/Maintenance of progress     Therapeutic Interventions/Treatment Strategies:    Area(s) of treatment focus addressed in this session included Symptom Management, Interpersonal Relationship Skills, and Maintenance of Progress    Patient checked in about their mental health symptoms, healthy coping skills used over the week, negative coping skills used over the week, what sexual behaviors they engaged in-fantasy, masturbation, sex, their comfort level with their behaviors, if there were triggers, urges to violate boundaries, and violations of boundaries.  They took time to process about letter he is writing for son they provided support and feedback to others in the group.    Psychotherapist offered support, feedback and validation and reinforced use of skills Treatment modalities used include [facility] THERAPY INTERVENTIONS: Cognitive Behavioral Therapy Group Dynamic Therapy  Interpersonal Relationship Skills: Assisted patients in implementing more effective communication skills in their relationships  Support, Feedback, and Structured Activity    Patient Response:   Patient responded to  session by accepting feedback, giving feedback, and listening  Possible barriers to participation / learning include: N/A    Current Mental Status Exam:   Appearance:  Appropriate   Eye Contact:  Good   Attitude / Demeanor: Cooperative   Speech      Rate / Production: Normal/ Responsive      Volume:  Normal  volume  Orientation:  All  Mood:   Normal Euthymic  Affect:   Appropriate   Thought Content: Clear   Insight:   Good       Plan/Need for Future Services:  Return for therapy in 4 weeks to treat diagnosed problems.    Patient has a current master individualized treatment plan.  See Epic treatment plan for more information.    Referral / Collaboration:  Referral to another professional/service is not indicated at this time..  Emergency Services Needed?  No    Assignment:  Return in one month     Interactive Complexity:  There are four specific communication difficulties that complicate the work of the primary psychiatric procedure.  Interactive complexity (+03526) may be reported when at least one of these difficulties is present.    Communication difficulties present during current the psychiatric procedure include:  None.      Signature/Title:    Doe Avendano, LMFT, [facility]

## 2025-07-20 DIAGNOSIS — E78.5 HYPERLIPIDEMIA LDL GOAL <100: ICD-10-CM

## 2025-07-21 RX ORDER — ATORVASTATIN CALCIUM 10 MG/1
10 TABLET, FILM COATED ORAL DAILY
Qty: 90 TABLET | Refills: 1 | Status: SHIPPED | OUTPATIENT
Start: 2025-07-21

## 2025-07-21 NOTE — TELEPHONE ENCOUNTER
Medication requested: atorvastatin (LIPITOR) 10 MG tablet   Last office visit: 3/24/2025  [facility] appointments: none  Medication last refilled: 10/18/2024; #90 + 2 refills  Last qualifying labs:     Component      Latest Ref Rng 11/25/2024  5:07 PM   LDL Cholesterol Calculated      <100 mg/dL 97      Prescription approved per [facility] Refill Protocol.    KARI Ochoa, RN  07/21/25, 2:21 PM

## 2025-08-13 ENCOUNTER — VIRTUAL VISIT (OUTPATIENT)
Dept: PSYCHOLOGY | Facility: CLINIC | Age: 55
End: 2025-08-13
Payer: COMMERCIAL

## 2025-08-13 DIAGNOSIS — F34.1 DYSTHYMIC DISORDER: Primary | ICD-10-CM

## 2025-08-13 DIAGNOSIS — F91.8 CONDUCT DISORDER, UNDIFFERENTIATED TYPE: ICD-10-CM

## 2025-08-13 PROCEDURE — 90832 PSYTX W PT 30 MINUTES: CPT | Mod: 95 | Performed by: MARRIAGE & FAMILY THERAPIST

## 2025-09-04 ENCOUNTER — OFFICE VISIT (OUTPATIENT)
Dept: PSYCHOLOGY | Facility: CLINIC | Age: 55
End: 2025-09-04
Payer: COMMERCIAL

## 2025-09-04 DIAGNOSIS — F34.1 DYSTHYMIC DISORDER: Primary | ICD-10-CM

## 2025-09-04 DIAGNOSIS — F91.8 CONDUCT DISORDER, UNDIFFERENTIATED TYPE: ICD-10-CM
